# Patient Record
Sex: FEMALE | Race: WHITE | Employment: FULL TIME | URBAN - METROPOLITAN AREA
[De-identification: names, ages, dates, MRNs, and addresses within clinical notes are randomized per-mention and may not be internally consistent; named-entity substitution may affect disease eponyms.]

---

## 2017-01-01 DIAGNOSIS — F33.0 MILD RECURRENT MAJOR DEPRESSION (H): Primary | ICD-10-CM

## 2017-01-02 NOTE — TELEPHONE ENCOUNTER
SERTRALINE HCL 50 MG TABLET     Last Written Prescription Date: 11/2/2016  Last Fill Quantity: 30, # refills: 1  Last Office Visit with [facility] primary care provider:  7/13/2016        Last PHQ-9 score on record=   PHQ-9 SCORE 7/13/2016   Total Score -   Total Score 5

## 2017-01-02 NOTE — TELEPHONE ENCOUNTER
Prescription approved per [facility] [place] [facility].  Magalis Nolen RN

## 2017-01-04 ENCOUNTER — TRANSFERRED RECORDS (OUTPATIENT)
Dept: HEALTH INFORMATION MANAGEMENT | Facility: CLINIC | Age: 52
End: 2017-01-04

## 2017-01-04 LAB — PAP-ABSTRACT: NORMAL

## 2017-01-09 ENCOUNTER — OFFICE VISIT (OUTPATIENT)
Dept: FAMILY MEDICINE | Facility: CLINIC | Age: 52
End: 2017-01-09
Payer: COMMERCIAL

## 2017-01-09 ENCOUNTER — HOSPITAL ENCOUNTER (OUTPATIENT)
Dept: MAMMOGRAPHY | Facility: CLINIC | Age: 52
Discharge: HOME | End: 2017-01-09
Attending: PHYSICIAN ASSISTANT | Admitting: PHYSICIAN ASSISTANT
Payer: COMMERCIAL

## 2017-01-09 VITALS
HEIGHT: 67 IN | OXYGEN SATURATION: 96 % | BODY MASS INDEX: 45.99 KG/M2 | HEART RATE: 82 BPM | SYSTOLIC BLOOD PRESSURE: 110 MMHG | WEIGHT: 293 LBS | DIASTOLIC BLOOD PRESSURE: 80 MMHG | TEMPERATURE: 99.4 F

## 2017-01-09 DIAGNOSIS — E11.9 TYPE 2 DIABETES MELLITUS WITHOUT COMPLICATION, WITHOUT LONG-TERM CURRENT USE OF INSULIN (H): Primary | ICD-10-CM

## 2017-01-09 DIAGNOSIS — E78.5 HYPERLIPIDEMIA WITH TARGET LDL LESS THAN 100: ICD-10-CM

## 2017-01-09 DIAGNOSIS — Z12.31 VISIT FOR SCREENING MAMMOGRAM: ICD-10-CM

## 2017-01-09 DIAGNOSIS — F33.1 MODERATE EPISODE OF RECURRENT MAJOR DEPRESSIVE DISORDER (H): ICD-10-CM

## 2017-01-09 DIAGNOSIS — E66.01 MORBID OBESITY, UNSPECIFIED OBESITY TYPE (H): ICD-10-CM

## 2017-01-09 DIAGNOSIS — Z23 NEED FOR PROPHYLACTIC VACCINATION AND INOCULATION AGAINST INFLUENZA: ICD-10-CM

## 2017-01-09 DIAGNOSIS — Z13.89 SCREENING FOR DIABETIC PERIPHERAL NEUROPATHY: ICD-10-CM

## 2017-01-09 DIAGNOSIS — Z11.59 NEED FOR HEPATITIS C SCREENING TEST: ICD-10-CM

## 2017-01-09 LAB
ALBUMIN SERPL-MCNC: 3.8 G/DL (ref 3.4–5)
ALP SERPL-CCNC: 88 U/L (ref 40–150)
ALT SERPL W P-5'-P-CCNC: 51 U/L (ref 0–50)
ANION GAP SERPL CALCULATED.3IONS-SCNC: 6 MMOL/L (ref 3–14)
AST SERPL W P-5'-P-CCNC: 20 U/L (ref 0–45)
BILIRUB SERPL-MCNC: 0.4 MG/DL (ref 0.2–1.3)
BUN SERPL-MCNC: 14 MG/DL (ref 7–30)
CALCIUM SERPL-MCNC: 9.3 MG/DL (ref 8.5–10.1)
CHLORIDE SERPL-SCNC: 105 MMOL/L (ref 94–109)
CHOLEST SERPL-MCNC: 174 MG/DL
CO2 SERPL-SCNC: 28 MMOL/L (ref 20–32)
CREAT SERPL-MCNC: 0.65 MG/DL (ref 0.52–1.04)
ERYTHROCYTE [DISTWIDTH] IN BLOOD BY AUTOMATED COUNT: 14 % (ref 10–15)
GFR SERPL CREATININE-BSD FRML MDRD: ABNORMAL ML/MIN/1.7M2
GLUCOSE SERPL-MCNC: 128 MG/DL (ref 70–99)
HBA1C MFR BLD: 6.4 % (ref 4.3–6)
HCT VFR BLD AUTO: 41.1 % (ref 35–47)
HDLC SERPL-MCNC: 54 MG/DL
HGB BLD-MCNC: 13.5 G/DL (ref 11.7–15.7)
LDLC SERPL CALC-MCNC: 96 MG/DL
MCH RBC QN AUTO: 28.1 PG (ref 26.5–33)
MCHC RBC AUTO-ENTMCNC: 32.8 G/DL (ref 31.5–36.5)
MCV RBC AUTO: 86 FL (ref 78–100)
NONHDLC SERPL-MCNC: 120 MG/DL
PLATELET # BLD AUTO: 241 10E9/L (ref 150–450)
POTASSIUM SERPL-SCNC: 4.2 MMOL/L (ref 3.4–5.3)
PROT SERPL-MCNC: 7.6 G/DL (ref 6.8–8.8)
RBC # BLD AUTO: 4.8 10E12/L (ref 3.8–5.2)
SODIUM SERPL-SCNC: 139 MMOL/L (ref 133–144)
TRIGL SERPL-MCNC: 120 MG/DL
WBC # BLD AUTO: 6.5 10E9/L (ref 4–11)

## 2017-01-09 PROCEDURE — 83036 HEMOGLOBIN GLYCOSYLATED A1C: CPT | Performed by: PHYSICIAN ASSISTANT

## 2017-01-09 PROCEDURE — 80061 LIPID PANEL: CPT | Performed by: PHYSICIAN ASSISTANT

## 2017-01-09 PROCEDURE — 90686 IIV4 VACC NO PRSV 0.5 ML IM: CPT | Performed by: PHYSICIAN ASSISTANT

## 2017-01-09 PROCEDURE — G0202 SCR MAMMO BI INCL CAD: HCPCS

## 2017-01-09 PROCEDURE — 99207 C FOOT EXAM  NO CHARGE: CPT | Performed by: PHYSICIAN ASSISTANT

## 2017-01-09 PROCEDURE — 85027 COMPLETE CBC AUTOMATED: CPT | Performed by: PHYSICIAN ASSISTANT

## 2017-01-09 PROCEDURE — 90471 IMMUNIZATION ADMIN: CPT | Performed by: PHYSICIAN ASSISTANT

## 2017-01-09 PROCEDURE — 99214 OFFICE O/P EST MOD 30 MIN: CPT | Mod: 25 | Performed by: PHYSICIAN ASSISTANT

## 2017-01-09 PROCEDURE — 80053 COMPREHEN METABOLIC PANEL: CPT | Performed by: PHYSICIAN ASSISTANT

## 2017-01-09 PROCEDURE — 36415 COLL VENOUS BLD VENIPUNCTURE: CPT | Performed by: PHYSICIAN ASSISTANT

## 2017-01-09 PROCEDURE — 99000 SPECIMEN HANDLING OFFICE-LAB: CPT | Performed by: PHYSICIAN ASSISTANT

## 2017-01-09 PROCEDURE — 86803 HEPATITIS C AB TEST: CPT | Mod: 90 | Performed by: PHYSICIAN ASSISTANT

## 2017-01-09 RX ORDER — SERTRALINE HYDROCHLORIDE 100 MG/1
100 TABLET, FILM COATED ORAL DAILY
Qty: 90 TABLET | Refills: 1 | Status: SHIPPED | OUTPATIENT
Start: 2017-01-09 | End: 2017-06-01

## 2017-01-09 RX ORDER — BUPROPION HYDROCHLORIDE 150 MG/1
150 TABLET ORAL EVERY MORNING
Qty: 90 TABLET | Refills: 1 | Status: SHIPPED | OUTPATIENT
Start: 2017-01-09 | End: 2017-06-01

## 2017-01-09 RX ORDER — METFORMIN HCL 500 MG
TABLET, EXTENDED RELEASE 24 HR ORAL
Qty: 180 TABLET | Refills: 1 | Status: SHIPPED | OUTPATIENT
Start: 2017-01-09 | End: 2017-06-01

## 2017-01-09 RX ORDER — MULTIVIT WITH MINERALS/LUTEIN
1 TABLET ORAL DAILY
COMMUNITY
End: 2018-03-08 | Stop reason: ALTCHOICE

## 2017-01-09 RX ORDER — ATORVASTATIN CALCIUM 10 MG/1
TABLET, FILM COATED ORAL
Qty: 90 TABLET | Refills: 3 | Status: SHIPPED | OUTPATIENT
Start: 2017-01-09 | End: 2018-03-01

## 2017-01-09 NOTE — MR AVS SNAPSHOT
After Visit Summary   1/9/2017    Juliann Mitchell    MRN: [id number]           Patient Information     Date Of Birth          1965        Visit Information        Provider Department      1/9/2017 11:00 AM Jessica Parham PA-C [facility]        Today's Diagnoses     Need for hepatitis C screening test    -  1     Screening for diabetic peripheral neuropathy         Need for prophylactic vaccination and inoculation against influenza         Need for prophylactic vaccination against Streptococcus pneumoniae (pneumococcus)         Mild recurrent major depression (H)         Hyperglycemia         Hyperlipidemia with target LDL less than 100         Type 2 diabetes mellitus without complication, without long-term current use of insulin (H)         Moderate episode of recurrent major depressive disorder (H)            Follow-ups after your visit        Future tests that were ordered for you today     Open Future Orders        Priority Expected Expires Ordered    MA Screening Digital Bilateral Routine  1/9/2018 1/9/2017            Who to contact     If you have questions or need follow up information about today's clinic visit or your schedule please contact [facility] directly at [phone].  Normal or non-critical lab and imaging results will be communicated to you by [employer], letter or phone within 4 business days after the clinic has received the results. If you do not hear from us within 7 days, please contact the clinic through [employer] or phone. If you have a critical or abnormal lab result, we will notify you by phone as soon as possible.  Submit refill requests through [employer] or call your pharmacy and they will forward the refill request to us. Please allow 3 business days for your refill to be completed.          Additional Information About Your Visit        [employer] Information     [employer] gives you secure access to your electronic health record. If you see a  "primary care provider, you can also send messages to your care team and make appointments. If you have questions, please call your primary care clinic.  If you do not have a primary care provider, please call [phone] and they will assist you.        Care EveryWhere ID     This is your Care EveryWhere ID. This could be used by other organizations to access your [place] medical records  [id number]        Your Vitals Were     Pulse Temperature Height BMI (Body Mass Index) Pulse Oximetry Last Period    82 99.4  F (37.4  C) (Oral) 5' 7\" (1.702 m) 47.29 kg/m2 96% 06/06/2015       Blood Pressure from Last 3 Encounters:   01/09/17 110/80   07/13/16 134/90   12/22/15 118/89    Weight from Last 3 Encounters:   01/09/17 302 lb (136.986 kg)   07/13/16 312 lb (141.522 kg)   12/22/15 312 lb (141.522 kg)              We Performed the Following     Albumin Random Urine Quantitative     CBC with platelets     Comprehensive metabolic panel     DEPRESSION ACTION PLAN (DAP) Order [[id number]]     FOOT EXAM  NO CHARGE [18003.114]     Hemoglobin A1c     Hepatitis C Screen Reflex to HCV RNA Quant and Genotype     Lipid Profile          Today's Medication Changes          These changes are accurate as of: 1/9/17 11:23 AM.  If you have any questions, ask your nurse or doctor.               These medicines have changed or have updated prescriptions.        Dose/Directions    buPROPion 150 MG 24 hr tablet   Commonly known as:  WELLBUTRIN XL   This may have changed:  See the new instructions.   Used for:  Moderate episode of recurrent major depressive disorder (H)   Changed by:  Jessica Parham PA-C        Dose:  150 mg   Take 1 tablet (150 mg) by mouth every morning   Quantity:  90 tablet   Refills:  1       sertraline 100 MG tablet   Commonly known as:  ZOLOFT   This may have changed:  See the new instructions.   Used for:  Mild recurrent major depression (H)   Changed by:  Jessica Parham PA-C        Dose:  100 mg   Take 1 tablet " (100 mg) by mouth daily   Quantity:  90 tablet   Refills:  1            Where to get your medicines      These medications were sent to [facility]/pharmacy #[id number] - [place], [place] - [address] [place] AT [facility]  [address] [facility] [address]     Phone:  [phone]    - buPROPion 150 MG 24 hr tablet  - sertraline 100 MG tablet             Primary Care Provider Office Phone # Fax #    Jessica Parham PA-C [phone]       [facility] [address]   [facility] [address]        Thank you!     Thank you for choosing [facility]  for your care. Our goal is always to provide you with excellent care. Hearing back from our patients is one way we can continue to improve our services. Please take a few minutes to complete the written survey that you may receive in the mail after your visit with us. Thank you!             Your Updated Medication List - Protect others around you: Learn how to safely use, store and throw away your medicines at www.disposemymeds.org.          This list is accurate as of: 1/9/17 11:23 AM.  Always use your most recent med list.                   Brand Name Dispense Instructions for use    atorvastatin 10 MG tablet    LIPITOR    30 tablet    TAKE 1 TABLET (10 MG) BY MOUTH DAILY       blood glucose calibration solution     1 Bottle    Use to check each new box of test strips for accuracy.       blood glucose monitoring lancets     1 Box    Use to test blood sugar 1 time daily or as directed.       buPROPion 150 MG 24 hr tablet    WELLBUTRIN XL    90 tablet    Take 1 tablet (150 mg) by mouth every morning       CENTRUM SILVER per tablet      Take 1 tablet by mouth daily       CO Q 10 PO      Take by mouth daily       COLLAGEN PLUS VITAMIN C PO      Take 1 tablet by mouth daily       FREESTYLE LITE test strip   Generic drug:  blood glucose monitoring     50 each    USE TO TEST BLOOD SUGAR 1 TIMES DAILY OR AS DIRECTED (VARY  TIMES BEFORE MEALS AND BED).       metFORMIN 500 MG 24 hr tablet    GLUCOPHAGE-XR    60 tablet    TAKE 2 TABLETS (1,000 MG) BY MOUTH DAILY (WITH DINNER)       sertraline 100 MG tablet    ZOLOFT    90 tablet    Take 1 tablet (100 mg) by mouth daily       VITAMIN D (CHOLECALCIFEROL) PO      Take 2,000 Units by mouth daily

## 2017-01-09 NOTE — NURSING NOTE
"Chief Complaint   Patient presents with     Follow Up For     diabetes       Initial /92 mmHg  Pulse 82  Temp(Src) 99.4  F (37.4  C) (Oral)  Ht 5' 7\" (1.702 m)  Wt 302 lb (136.986 kg)  BMI 47.29 kg/m2  SpO2 96%  LMP 06/06/2015 Estimated body mass index is 47.29 kg/(m^2) as calculated from the following:    Height as of this encounter: 5' 7\" (1.702 m).    Weight as of this encounter: 302 lb (136.986 kg).  BP completed using cuff size: large, left arm  Bambi Forman MA  "

## 2017-01-09 NOTE — PROGRESS NOTES
"HPI: 52 yo female here for f/u DM, depression and obesity  Depression:   She tapered herself off of lexapro to see if that was causing wt gain, but then felt more irritable and had a \"mental breakdown\"  She tried wellbutrin but was still crying all of the time so Zoloft 50mg qd was added and depression sxs improved.  She feels well on this combination but has a very stressful job and would like to try a higher dose of the zoloft.  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  She has some twitching of her eye but that resolved; she wonders if this was stress related.  She does drink a cup of coffee in the morning. State she feels she sleeps well but can't sleep with  as he snores.    Diabetes:  She brings in an aminta she has showing her recent blood sugars which seem to fluctuate but mostly stay under 200  She rarely feels a little shaky if her blood sugar is in the 70s although this is rare.  She denies any SE from her metformin 1000mg with dinner    Obesity:  She has been able to lose some weight since her last office visit so encouraged by that.  Trying to make better food choices  She is in the process of getting the gastric sleeve surgery in June of this year if everything goes through.  She does have the lap band but this has not been effective for her.    Past Medical History   Diagnosis Date     Depression, major, recurrent (H)      Obesity      s/p lap band     Colon polyps      FH: colon cancer      father     Pituitary adenoma (H)      Resected age 16     Past Surgical History   Procedure Laterality Date     Laparoscopic gastric adjustable banding       [facility]     Benign pituitary gland tumor removed       age 16     Social History   Substance Use Topics     Smoking status: Never Smoker      Smokeless tobacco: Never Used     Alcohol Use: No     Current Outpatient Prescriptions   Medication Sig Dispense Refill     Multiple Vitamins-Minerals " "(CENTRUM SILVER) per tablet Take 1 tablet by mouth daily       Coenzyme Q10 (CO Q 10 PO) Take by mouth daily       sertraline (ZOLOFT) 100 MG tablet Take 1 tablet (100 mg) by mouth daily 90 tablet 1     buPROPion (WELLBUTRIN XL) 150 MG 24 hr tablet Take 1 tablet (150 mg) by mouth every morning 90 tablet 1     metFORMIN (GLUCOPHAGE-XR) 500 MG 24 hr tablet TAKE 2 TABLETS (1,000 MG) BY MOUTH DAILY (WITH DINNER) 180 tablet 1     atorvastatin (LIPITOR) 10 MG tablet TAKE 1 TABLET (10 MG) BY MOUTH DAILY 90 tablet 3     FREESTYLE LITE test strip USE TO TEST BLOOD SUGAR 1 TIMES DAILY OR AS DIRECTED (VARY TIMES BEFORE MEALS AND BED). 50 each 11     Collagen-Vitamin C (COLLAGEN PLUS VITAMIN C PO) Take 1 tablet by mouth daily       VITAMIN D, CHOLECALCIFEROL, PO Take 2,000 Units by mouth daily       blood glucose monitoring (FREESTYLE) lancets Use to test blood sugar 1 time daily or as directed. 1 Box 3     blood glucose calibration (FREESTYLE CONTROL) solution Use to check each new box of test strips for accuracy. 1 Bottle 3     [DISCONTINUED] sertraline (ZOLOFT) 50 MG tablet TAKE 1 TABLET (50 MG) BY MOUTH DAILY 30 tablet 3     [DISCONTINUED] buPROPion (WELLBUTRIN XL) 150 MG 24 hr tablet TAKE 1 TABLET (150 MG) BY MOUTH EVERY MORNING 30 tablet 1     [DISCONTINUED] atorvastatin (LIPITOR) 10 MG tablet TAKE 1 TABLET (10 MG) BY MOUTH DAILY 30 tablet 3     [DISCONTINUED] buPROPion (WELLBUTRIN XL) 300 MG 24 hr tablet Take 1 tablet (300 mg) by mouth every morning 90 tablet 1     [DISCONTINUED] metFORMIN (GLUCOPHAGE-XR) 500 MG 24 hr tablet TAKE 2 TABLETS (1,000 MG) BY MOUTH DAILY (WITH DINNER) 60 tablet 1     Allergies   Allergen Reactions     No Known Drug Allergy      FAMILY HISTORY NOTED AND REVIEWED      PHYSICAL EXAM:    /80 mmHg  Pulse 82  Temp(Src) 99.4  F (37.4  C) (Oral)  Ht 5' 7\" (1.702 m)  Wt 302 lb (136.986 kg)  BMI 47.29 kg/m2  SpO2 96%  LMP 06/06/2015    Patient appears non toxic  Lungs: CTA bilat  Heart: RRR " without m/r/g  Foot exam: no edema or ulcerations  Psych: approp affect and mood.  Good eye contact, not anxious or tearful    Results for orders placed or performed in visit on 01/09/17   Comprehensive metabolic panel   Result Value Ref Range    Sodium 139 133 - 144 mmol/L    Potassium 4.2 3.4 - 5.3 mmol/L    Chloride 105 94 - 109 mmol/L    Carbon Dioxide 28 20 - 32 mmol/L    Anion Gap 6 3 - 14 mmol/L    Glucose 128 (H) 70 - 99 mg/dL    Urea Nitrogen 14 7 - 30 mg/dL    Creatinine 0.65 0.52 - 1.04 mg/dL    GFR Estimate >90  Non  GFR Calc   >60 mL/min/1.7m2    GFR Estimate If Black >90   GFR Calc   >60 mL/min/1.7m2    Calcium 9.3 8.5 - 10.1 mg/dL    Bilirubin Total 0.4 0.2 - 1.3 mg/dL    Albumin 3.8 3.4 - 5.0 g/dL    Protein Total 7.6 6.8 - 8.8 g/dL    Alkaline Phosphatase 88 40 - 150 U/L    ALT 51 (H) 0 - 50 U/L    AST 20 0 - 45 U/L   Hemoglobin A1c   Result Value Ref Range    Hemoglobin A1C 6.4 (H) 4.3 - 6.0 %   Lipid Profile   Result Value Ref Range    Cholesterol 174 <200 mg/dL    Triglycerides 120 <150 mg/dL    HDL Cholesterol 54 >49 mg/dL    LDL Cholesterol Calculated 96 <100 mg/dL    Non HDL Cholesterol 120 <130 mg/dL   CBC with platelets   Result Value Ref Range    WBC 6.5 4.0 - 11.0 10e9/L    RBC Count 4.80 3.8 - 5.2 10e12/L    Hemoglobin 13.5 11.7 - 15.7 g/dL    Hematocrit 41.1 35.0 - 47.0 %    MCV 86 78 - 100 fl    MCH 28.1 26.5 - 33.0 pg    MCHC 32.8 31.5 - 36.5 g/dL    RDW 14.0 10.0 - 15.0 %    Platelet Count 241 150 - 450 10e9/L         Assessment and Plan:     (E11.9) Type 2 diabetes mellitus without complication, without long-term current use of insulin (H)  (primary encounter diagnosis)  Comment: A1c at 6.4% today so at goal.  Cont same dose of metformin  Plan: Comprehensive metabolic panel, Hemoglobin A1c,         Lipid Profile, CBC with platelets, metFORMIN         (GLUCOPHAGE-XR) 500 MG 24 hr tablet, Albumin         Random Urine Quantitative, CANCELED: Albumin          Random Urine Quantitative        (not able to urinate today)    (F33.1) Moderate episode of recurrent major depressive disorder (H)  Comment: increased zoloft from 50 to 100mg qd. She will cont to take this in combination with wellbutrin as the wellbutrin has decreased her food cravings.  Plan: buPROPion (WELLBUTRIN XL) 150 MG 24 hr tablet            (E66.01) Morbid obesity, unspecified obesity type (H)  Comment:   Plan: She has been losing weight and in the process of trying to get approved for gastric sleeve surgery    (E78.5) Hyperlipidemia with target LDL less than 100  Comment:   Plan: atorvastatin (LIPITOR) 10 MG tablet        refilled    (Z11.59) Need for hepatitis C screening test  Comment:   Plan: Hepatitis C Screen Reflex to HCV RNA Quant and         Genotype            (Z13.89) Screening for diabetic peripheral neuropathy  Comment:   Plan: FOOT EXAM  NO CHARGE [15142.114]            (Z23) Need for prophylactic vaccination and inoculation against influenza  Comment:   Plan: FLU VAC, SPLIT VIRUS IM > 3 YO (QUADRIVALENT)         [78997], Vaccine Administration, Initial         [26866]              Jessica Parham PA-C

## 2017-01-09 NOTE — PROGRESS NOTES
Injectable Influenza Immunization Documentation    1.  Is the person to be vaccinated sick today?  No    2. Does the person to be vaccinated have an allergy to eggs or to a component of the vaccine?  No    3. Has the person to be vaccinated today ever had a serious reaction to influenza vaccine in the past?  No    4. Has the person to be vaccinated ever had Guillain-Lentner syndrome?  No     Form completed by patient.

## 2017-01-10 LAB — HCV AB SERPL QL IA: NORMAL

## 2017-01-10 ASSESSMENT — PATIENT HEALTH QUESTIONNAIRE - PHQ9: SUM OF ALL RESPONSES TO PHQ QUESTIONS 1-9: 3

## 2017-01-11 NOTE — PROGRESS NOTES
Quick Note:    It was a pleasure seeing you. I wanted to get back to you with your test results. I have enclosed a copy for your review.     The screening test for hepatitis C was negative.    I am happy to report that your CBC or complete blood count is normal with no signs of anemia, leukemia or platelet abnormalities. Your chemistry panel shows a blood sugar of 128. Your A1c shows good diabetes control as we discussed. Your blood salts, kidney tests, and proteins are all fine. The liver functions are almost normal.    Your total cholesterol is 174 with the normal range being below 200. Your HDL or good cholesterol is 54 with the normal range being above 50. Your LDL or bad cholesterol is 96 with the normal range being below 100. This looks good.    Let me know if you have any questions.    Jessica Parham PA-C      ______

## 2017-06-01 ENCOUNTER — OFFICE VISIT (OUTPATIENT)
Dept: FAMILY MEDICINE | Facility: CLINIC | Age: 52
End: 2017-06-01
Payer: COMMERCIAL

## 2017-06-01 VITALS
SYSTOLIC BLOOD PRESSURE: 120 MMHG | BODY MASS INDEX: 45.99 KG/M2 | TEMPERATURE: 98.7 F | DIASTOLIC BLOOD PRESSURE: 72 MMHG | HEIGHT: 67 IN | HEART RATE: 109 BPM | OXYGEN SATURATION: 96 % | WEIGHT: 293 LBS

## 2017-06-01 DIAGNOSIS — F33.1 MODERATE EPISODE OF RECURRENT MAJOR DEPRESSIVE DISORDER (H): ICD-10-CM

## 2017-06-01 DIAGNOSIS — E11.9 TYPE 2 DIABETES MELLITUS WITHOUT COMPLICATION, WITHOUT LONG-TERM CURRENT USE OF INSULIN (H): ICD-10-CM

## 2017-06-01 DIAGNOSIS — Z01.818 PREOP GENERAL PHYSICAL EXAM: Primary | ICD-10-CM

## 2017-06-01 DIAGNOSIS — E66.01 MORBID OBESITY, UNSPECIFIED OBESITY TYPE (H): ICD-10-CM

## 2017-06-01 LAB
HBA1C MFR BLD: 6.3 % (ref 4.3–6)
HGB BLD-MCNC: 14 G/DL (ref 11.7–15.7)

## 2017-06-01 PROCEDURE — 93000 ELECTROCARDIOGRAM COMPLETE: CPT | Performed by: PHYSICIAN ASSISTANT

## 2017-06-01 PROCEDURE — 85018 HEMOGLOBIN: CPT | Performed by: PHYSICIAN ASSISTANT

## 2017-06-01 PROCEDURE — 36415 COLL VENOUS BLD VENIPUNCTURE: CPT | Performed by: PHYSICIAN ASSISTANT

## 2017-06-01 PROCEDURE — 83036 HEMOGLOBIN GLYCOSYLATED A1C: CPT | Performed by: PHYSICIAN ASSISTANT

## 2017-06-01 PROCEDURE — 99214 OFFICE O/P EST MOD 30 MIN: CPT | Performed by: PHYSICIAN ASSISTANT

## 2017-06-01 RX ORDER — METFORMIN HCL 500 MG
TABLET, EXTENDED RELEASE 24 HR ORAL
Qty: 180 TABLET | Refills: 1 | Status: SHIPPED | OUTPATIENT
Start: 2017-06-01 | End: 2018-03-01

## 2017-06-01 RX ORDER — SERTRALINE HYDROCHLORIDE 100 MG/1
100 TABLET, FILM COATED ORAL DAILY
Qty: 90 TABLET | Refills: 1 | Status: SHIPPED | OUTPATIENT
Start: 2017-06-01 | End: 2018-01-21

## 2017-06-01 RX ORDER — BUPROPION HYDROCHLORIDE 150 MG/1
150 TABLET ORAL EVERY MORNING
Qty: 90 TABLET | Refills: 1 | Status: SHIPPED | OUTPATIENT
Start: 2017-06-01 | End: 2018-03-01

## 2017-06-01 NOTE — MR AVS SNAPSHOT
After Visit Summary   6/1/2017    Juliann Mitchell    MRN: [id number]           Patient Information     Date Of Birth          1965        Visit Information        Provider Department      6/1/2017 3:30 PM Jessica Parham PA-C [facility]        Today's Diagnoses     Preop general physical exam    -  1    Morbid obesity, unspecified obesity type (H)        Type 2 diabetes mellitus without complication, without long-term current use of insulin (H)        Moderate episode of recurrent major depressive disorder (H)          Care Instructions      Before Your Surgery      Call your surgeon if there is any change in your health. This includes signs of a cold or flu (such as a sore throat, runny nose, cough, rash or fever).    Do not smoke, drink alcohol or take over the counter medicine (unless your surgeon or primary care doctor tells you to) for the 24 hours before and after surgery.    If you take prescribed drugs: Follow your doctor s orders about which medicines to take and which to stop until after surgery.    Eating and drinking prior to surgery: follow the instructions from your surgeon    Take a shower or bath the night before surgery. Use the soap your surgeon gave you to gently clean your skin. If you do not have soap from your surgeon, use your regular soap. Do not shave or scrub the surgery site.  Wear clean pajamas and have clean sheets on your bed.           Follow-ups after your visit        Future tests that were ordered for you today     Open Future Orders        Priority Expected Expires Ordered    Albumin Random Urine Quantitative Routine  6/1/2018 6/1/2017            Who to contact     If you have questions or need follow up information about today's clinic visit or your schedule please contact [facility] directly at [phone].  Normal or non-critical lab and imaging results will be communicated to you by MyChart, letter or phone within 4 business days  "after the clinic has received the results. If you do not hear from us within 7 days, please contact the clinic through [email or web address] or phone. If you have a critical or abnormal lab result, we will notify you by phone as soon as possible.  Submit refill requests through [email or web address] or call your pharmacy and they will forward the refill request to us. Please allow 3 business days for your refill to be completed.          Additional Information About Your Visit        [employer] Information     [email or web address] gives you secure access to your electronic health record. If you see a primary care provider, you can also send messages to your care team and make appointments. If you have questions, please call your primary care clinic.  If you do not have a primary care provider, please call [phone] and they will assist you.        Care EveryWhere ID     This is your Care EveryWhere ID. This could be used by other organizations to access your [place] medical records  [id number]        Your Vitals Were     Pulse Temperature Height Pulse Oximetry Breastfeeding? BMI (Body Mass Index)    109 98.7  F (37.1  C) (Tympanic) 5' 7\" (1.702 m) 96% No 48.46 kg/m2       Blood Pressure from Last 3 Encounters:   06/01/17 120/72   01/09/17 110/80   07/13/16 134/90    Weight from Last 3 Encounters:   06/01/17 (!) 309 lb 6.4 oz (140.3 kg)   01/09/17 (!) 302 lb (137 kg)   07/13/16 (!) 312 lb (141.5 kg)              We Performed the Following     EKG 12-lead complete w/read - Clinics     Hemoglobin A1c     Hemoglobin          Where to get your medicines      These medications were sent to [facility]/pharmacy #[id number] - [place], [place] - [address], [place] AT [place] [address] [facility] [address]     Phone:  [phone]     buPROPion 150 MG 24 hr tablet    metFORMIN 500 MG 24 hr tablet    sertraline 100 MG tablet          Primary Care Provider Office Phone # Fax #    Jessica Parham PA-C [phone]       " [facility] [address]   [facility] [address]        Thank you!     Thank you for choosing [facility]  for your care. Our goal is always to provide you with excellent care. Hearing back from our patients is one way we can continue to improve our services. Please take a few minutes to complete the written survey that you may receive in the mail after your visit with us. Thank you!             Your Updated Medication List - Protect others around you: Learn how to safely use, store and throw away your medicines at www.disposemymeds.org.          This list is accurate as of: 6/1/17  4:37 PM.  Always use your most recent med list.                   Brand Name Dispense Instructions for use    atorvastatin 10 MG tablet    LIPITOR    90 tablet    TAKE 1 TABLET (10 MG) BY MOUTH DAILY       blood glucose calibration solution     1 Bottle    Use to check each new box of test strips for accuracy.       blood glucose monitoring lancets     1 Box    Use to test blood sugar 1 time daily or as directed.       buPROPion 150 MG 24 hr tablet    WELLBUTRIN XL    90 tablet    Take 1 tablet (150 mg) by mouth every morning       CENTRUM SILVER per tablet      Take 1 tablet by mouth daily       CO Q 10 PO      Take by mouth daily       COLLAGEN PLUS VITAMIN C PO      Take 1 tablet by mouth daily       FREESTYLE LITE test strip   Generic drug:  blood glucose monitoring     50 each    USE TO TEST BLOOD SUGAR 1 TIMES DAILY OR AS DIRECTED (VARY TIMES BEFORE MEALS AND BED).       metFORMIN 500 MG 24 hr tablet    GLUCOPHAGE-XR    180 tablet    TAKE 2 TABLETS (1,000 MG) BY MOUTH DAILY (WITH DINNER)       sertraline 100 MG tablet    ZOLOFT    90 tablet    Take 1 tablet (100 mg) by mouth daily       VITAMIN D (CHOLECALCIFEROL) PO      Take 2,000 Units by mouth daily

## 2017-06-01 NOTE — PROGRESS NOTES
[address] [address]  [phone]  Dept: [phone]    PRE-OP EVALUATION:  Today's date: 2017    Juliann Mitchell (: 1965) presents for pre-operative evaluation assessment as requested by Dr. Noyola.  She requires evaluation and anesthesia risk assessment prior to undergoing surgery/procedure for treatment of obesity .  Proposed procedure: Gastric Sleeve and removal of lap band    Date of Surgery/ Procedure: 2017  Time of Surgery/ Procedure: 07:00 am  Hospital/Surgical Facility: [facility]  Fax number for surgical facility: [phone]  Primary Physician: Jessica Parham PA-C  Type of Anesthesia Anticipated: to be determined    Patient has a Health Care Directive or Living Will:  NO      1. NO - Do you have a history of heart attack, stroke, stent, bypass or surgery on an artery in the head, neck, heart or legs?  2. NO - Do you ever have any pain or discomfort in your chest?  3. NO - Do you have a history of  Heart Failure?  4. NO - Are you troubled by shortness of breath when: walking on the level, up a slight hill or at night?  5. NO - Do you currently have a cold, bronchitis or other respiratory infection?  6. NO - Do you have a cough, shortness of breath or wheezing?  7. NO - Do you sometimes get pains in the calves of your legs when you walk?  8. NO - Do you or anyone in your family have previous history of blood clots?  9. NO - Do you or does anyone in your family have a serious bleeding problem such as prolonged bleeding following surgeries or cuts?  10. NO - Have you ever had problems with anemia or been told to take iron pills?  11. NO - Have you had any abnormal blood loss such as black, tarry or bloody stools, or abnormal vaginal bleeding?  12. NO - Have you ever had a blood transfusion?  13. NO - Have you or any of your relatives ever had problems with anesthesia?  14. NO - Do you have sleep apnea, excessive snoring or daytime  drowsiness?  15. NO - Do you have any prosthetic heart valves?  16. NO - Do you have prosthetic joints?  17. NO - Is there any chance that you may be pregnant?      HPI:                                                      Brief HPI related to upcoming procedure: pt had lap band procedure which was not effective for significant weight loss      MEDICAL HISTORY:                                                      Patient Active Problem List    Diagnosis Date Noted     Moderate episode of recurrent major depressive disorder (H) 08/15/2016     Priority: Medium     Type 2 diabetes mellitus without complications (H) 10/23/2015     Priority: Medium     Hyperlipidemia with target LDL less than 100 09/24/2015     Priority: Medium     Diagnosis updated by automated process. Provider to review and confirm.       Morbid obesity (H) 09/24/2015     Priority: Medium     Pituitary tumor 10/16/2013     Priority: Medium     Upper abdominal pain 10/16/2013     Priority: Medium     Hyperglycemia 03/28/2013     Priority: Medium     Hypocalcemia 03/28/2013     Priority: Medium     Vitamin D deficiency 03/28/2013     Priority: Medium     Problem list name updated by automated process. Provider to review and confirm  [employer] Update utility       Anemia 03/28/2013     Priority: Medium     Problem list name updated by automated process. Provider to review       Mild recurrent major depression (H) 05/31/2012     Priority: Medium      Past Medical History:   Diagnosis Date     Colon polyps      Depression, major, recurrent (H)      FH: colon cancer     father     Obesity     s/p lap band     Pituitary adenoma (H)     Resected age 16     Past Surgical History:   Procedure Laterality Date     Benign pituitary gland tumor removed      age 16     LAPAROSCOPIC GASTRIC ADJUSTABLE BANDING      [facility]     Current Outpatient Prescriptions   Medication Sig Dispense Refill     sertraline (ZOLOFT) 100 MG tablet Take 1 tablet (100 mg) by mouth daily  90 tablet 1     buPROPion (WELLBUTRIN XL) 150 MG 24 hr tablet Take 1 tablet (150 mg) by mouth every morning 90 tablet 1     metFORMIN (GLUCOPHAGE-XR) 500 MG 24 hr tablet TAKE 2 TABLETS (1,000 MG) BY MOUTH DAILY (WITH DINNER) 180 tablet 1     Multiple Vitamins-Minerals (CENTRUM SILVER) per tablet Take 1 tablet by mouth daily       Coenzyme Q10 (CO Q 10 PO) Take by mouth daily       atorvastatin (LIPITOR) 10 MG tablet TAKE 1 TABLET (10 MG) BY MOUTH DAILY 90 tablet 3     FREESTYLE LITE test strip USE TO TEST BLOOD SUGAR 1 TIMES DAILY OR AS DIRECTED (VARY TIMES BEFORE MEALS AND BED). 50 each 11     Collagen-Vitamin C (COLLAGEN PLUS VITAMIN C PO) Take 1 tablet by mouth daily       VITAMIN D, CHOLECALCIFEROL, PO Take 2,000 Units by mouth daily       blood glucose monitoring (FREESTYLE) lancets Use to test blood sugar 1 time daily or as directed. 1 Box 3     blood glucose calibration (FREESTYLE CONTROL) solution Use to check each new box of test strips for accuracy. 1 Bottle 3     [DISCONTINUED] sertraline (ZOLOFT) 100 MG tablet Take 1 tablet (100 mg) by mouth daily 90 tablet 1     [DISCONTINUED] buPROPion (WELLBUTRIN XL) 150 MG 24 hr tablet Take 1 tablet (150 mg) by mouth every morning 90 tablet 1     [DISCONTINUED] metFORMIN (GLUCOPHAGE-XR) 500 MG 24 hr tablet TAKE 2 TABLETS (1,000 MG) BY MOUTH DAILY (WITH DINNER) 180 tablet 1     OTC products: herbals and vitamins - vitamin D and multi vitamins    Allergies   Allergen Reactions     No Known Drug Allergy       Latex Allergy: NO    Social History   Substance Use Topics     Smoking status: Never Smoker     Smokeless tobacco: Never Used     Alcohol use No     History   Drug Use No       REVIEW OF SYSTEMS:                                                    C: NEGATIVE for fever, chills, change in weight  E/M: NEGATIVE for ear, mouth and throat problems  R: NEGATIVE for significant cough or SOB  CV: NEGATIVE for chest pain, palpitations or peripheral edema    EXAM:             "                                        /72 (BP Location: Right arm, Patient Position: Chair, Cuff Size: Adult Large)  Pulse 109  Temp 98.7  F (37.1  C) (Tympanic)  Ht 5' 7\" (1.702 m)  Wt (!) 309 lb 6.4 oz (140.3 kg)  SpO2 96%  Breastfeeding? No  BMI 48.46 kg/m2  GENERAL APPEARANCE: healthy, alert and no distress  HENT: ear canals and TM's normal and nose and mouth without ulcers or lesions  Breast exam: no masses or axillary LA  RESP: lungs clear to auscultation - no rales, rhonchi or wheezes  CV: regular rate and rhythm, normal S1 S2, no S3 or S4 and no murmur, click or rub   ABDOMEN: Lap band port palpable, soft, nontender, no HSM or masses and bowel sounds normal  NEURO: Normal strength and tone, sensory exam grossly normal, mentation intact and speech normal    DIAGNOSTICS:                                                      Results for orders placed or performed in visit on 06/01/17   Hemoglobin A1c   Result Value Ref Range    Hemoglobin A1C 6.3 (H) 4.3 - 6.0 %   Hemoglobin   Result Value Ref Range    Hemoglobin 14.0 11.7 - 15.7 g/dL     EKG: NSR with T wave inversions AVR and V1, no comparison avail    IMPRESSION:                                                    Reason for surgery/procedure: Gastric sleeve    The proposed surgical procedure is considered INTERMEDIATE risk.        ICD-10-CM    1. Preop general physical exam Z01.818 Hemoglobin   2. Morbid obesity, unspecified obesity type (H) E66.01    3. Type 2 diabetes mellitus without complication, without long-term current use of insulin (H) E11.9 Albumin Random Urine Quantitative     metFORMIN (GLUCOPHAGE-XR) 500 MG 24 hr tablet     Hemoglobin A1c     EKG 12-lead complete w/read - Clinics   4. Moderate episode of recurrent major depressive disorder (H) F33.1 sertraline (ZOLOFT) 100 MG tablet     buPROPion (WELLBUTRIN XL) 150 MG 24 hr tablet       RECOMMENDATIONS:                                                      --Consult hospital " rounder / IM to assist post-op medical management    --Patient is to take all scheduled medications on the day of surgery EXCEPT for modifications listed below.    Diabetes Medication Use    -----Hold usual  oral diabetic meds (e.g. Metformin, Actos, Glipizide) while NPO.       APPROVAL GIVEN to proceed with proposed procedure, without further diagnostic evaluation. She is take her wellbutrin and zoloft the AM of surgery with a sip of water.       Signed Electronically by: Jessica Parham PA-C    Copy of this evaluation report is provided to requesting physician.    [facility] Preop Guidelines

## 2017-06-01 NOTE — NURSING NOTE
"Chief Complaint   Patient presents with     Pre-Op Exam       Initial /72 (BP Location: Right arm, Patient Position: Chair, Cuff Size: Adult Large)  Pulse 109  Temp 98.7  F (37.1  C) (Tympanic)  Ht 5' 7\" (1.702 m)  Wt (!) 309 lb 6.4 oz (140.3 kg)  SpO2 96%  Breastfeeding? No  BMI 48.46 kg/m2 Estimated body mass index is 48.46 kg/(m^2) as calculated from the following:    Height as of this encounter: 5' 7\" (1.702 m).    Weight as of this encounter: 309 lb 6.4 oz (140.3 kg).  Medication Reconciliation: joana Alvarez      "

## 2017-08-25 ENCOUNTER — TRANSFERRED RECORDS (OUTPATIENT)
Dept: HEALTH INFORMATION MANAGEMENT | Facility: CLINIC | Age: 52
End: 2017-08-25

## 2017-11-11 ENCOUNTER — HEALTH MAINTENANCE LETTER (OUTPATIENT)
Age: 52
End: 2017-11-11

## 2017-12-03 ENCOUNTER — TRANSFERRED RECORDS (OUTPATIENT)
Dept: HEALTH INFORMATION MANAGEMENT | Facility: CLINIC | Age: 52
End: 2017-12-03

## 2017-12-03 LAB
ALT SERPL-CCNC: 53 U/L (ref 9–55)
AST SERPL-CCNC: 85 U/L (ref 10–40)
CREAT SERPL-MCNC: 0.74 MG/DL (ref 0.55–1.02)
GFR SERPL CREATININE-BSD FRML MDRD: >60 ML/MIN/1.73M2
GLUCOSE SERPL-MCNC: 167 MG/DL (ref 70–100)
POTASSIUM SERPL-SCNC: 4.4 MMOL/L (ref 3.5–5.2)

## 2018-01-21 DIAGNOSIS — F33.1 MODERATE EPISODE OF RECURRENT MAJOR DEPRESSIVE DISORDER (H): ICD-10-CM

## 2018-01-22 NOTE — TELEPHONE ENCOUNTER
"Requested Prescriptions   Pending Prescriptions Disp Refills     sertraline (ZOLOFT) 100 MG tablet [Pharmacy Med Name: SERTRALINE  MG TABLET] 90 tablet 1     Sig: TAKE 1 TABLET BY MOUTH DAILY    SSRIs Protocol Failed    1/21/2018  1:52 PM       Failed - PHQ-9 score less than 5 in past 6 months    The PHQ-9 criteria is meant to fail. It requires a PHQ-9 score review         Failed - Recent (6 mo) or future visit with authorizing provider's specialty    Patient had office visit in the last 6 months or has a visit in the next 30 days with authorizing provider.  See \"Patient Info\" tab in inbasket, or \"Choose Columns\" in Meds & Orders section of the refill encounter.           Passed - Patient is age 18 or older       Passed - No active pregnancy on record       Passed - No positive pregnancy test in last 12 months        sertraline (ZOLOFT) 100 MG tablet 90 tablet 1 6/1/2017       Last Written Prescription Date:  06/01/2017  Last Fill Quantity: 90,  # refills: 1   Last Office Visit with [facility], [facility] or [facility] prescribing provider:  06/01/2017   Future Office Visit:  Unknown       PHQ-9 SCORE 9/24/2015 7/13/2016 1/9/2017   Total Score - - -   Total Score 8 5 3       "

## 2018-01-24 RX ORDER — SERTRALINE HYDROCHLORIDE 100 MG/1
TABLET, FILM COATED ORAL
Qty: 30 TABLET | Refills: 0 | Status: SHIPPED | OUTPATIENT
Start: 2018-01-24 | End: 2018-03-01

## 2018-01-24 NOTE — TELEPHONE ENCOUNTER
Medication is being filled for 1 time refill only due to:  Patient needs to be seen because due for physical.   Cecily FIERRO RN

## 2018-02-20 DIAGNOSIS — F33.1 MODERATE EPISODE OF RECURRENT MAJOR DEPRESSIVE DISORDER (H): ICD-10-CM

## 2018-02-20 NOTE — TELEPHONE ENCOUNTER
"Last Written Prescription Date:  1/24/18  Last Fill Quantity: 30 tablet,  # refills: 0   Last office visit: 6/1/2017 with prescribing provider:  Dione   Future Office Visit:      *Note to pharmacy: 1 month refill only; patient due for appointment (physical)    Requested Prescriptions   Pending Prescriptions Disp Refills     sertraline (ZOLOFT) 100 MG tablet [Pharmacy Med Name: SERTRALINE  MG TABLET] 30 tablet 0     Sig: TAKE 1 TABLET BY MOUTH DAILY    SSRIs Protocol Failed    2/20/2018  2:02 AM       Failed - PHQ-9 score less than 5 in past 6 months    The PHQ-9 criteria is meant to fail. It requires a PHQ-9 score review         Failed - Recent (6 mo) or future visit with authorizing provider's specialty    Patient had office visit in the last 6 months or has a visit in the next 30 days with authorizing provider.  See \"Patient Info\" tab in inbasket, or \"Choose Columns\" in Meds & Orders section of the refill encounter.           Passed - Patient is age 18 or older       Passed - No active pregnancy on record       Passed - No positive pregnancy test in last 12 months        PHQ-9 SCORE 9/24/2015 7/13/2016 1/9/2017   Total Score - - -   Total Score 8 5 3       "

## 2018-02-20 NOTE — TELEPHONE ENCOUNTER
Patient given 1 month supply 1/24/18  Due for physical/diabetes follow up.  [employer] message sent to patient asking her to schedule appointment.  Chioma Sarmiento RN

## 2018-02-23 RX ORDER — SERTRALINE HYDROCHLORIDE 100 MG/1
TABLET, FILM COATED ORAL
Qty: 30 TABLET | Refills: 0 | OUTPATIENT
Start: 2018-02-23

## 2018-02-23 NOTE — TELEPHONE ENCOUNTER
Pt scheduled for 3/1. Would like a refill to get her through until appointment   Rapid strep positive. [facility] consulted and will see patient in ED. [facility] Peritonsilar abscess drained by ENT, will discharge home with po Clindamycin and ENT follow up.

## 2018-02-23 NOTE — TELEPHONE ENCOUNTER
Call to her pharmacy at [facility] and gave verbal order to pharmacist to authorize a 5 day supply of Zoloft 100 mg tabs to get her through until her appointment on 3/1/18 with Jessica Parham.  The pharmacy can only give them a three day supply without us calling to authorize.  She had already been granted a 30 day judy period.    LM on her Mobile VM regarding the 5 tablets.    Aleta Fwoler RN

## 2018-03-01 ENCOUNTER — OFFICE VISIT (OUTPATIENT)
Dept: FAMILY MEDICINE | Facility: CLINIC | Age: 53
End: 2018-03-01
Payer: COMMERCIAL

## 2018-03-01 ENCOUNTER — HOSPITAL ENCOUNTER (OUTPATIENT)
Dept: MAMMOGRAPHY | Facility: CLINIC | Age: 53
Discharge: HOME | End: 2018-03-01
Attending: PHYSICIAN ASSISTANT | Admitting: PHYSICIAN ASSISTANT
Payer: COMMERCIAL

## 2018-03-01 VITALS
BODY MASS INDEX: 38.14 KG/M2 | DIASTOLIC BLOOD PRESSURE: 83 MMHG | TEMPERATURE: 97.7 F | HEART RATE: 70 BPM | WEIGHT: 243 LBS | OXYGEN SATURATION: 97 % | SYSTOLIC BLOOD PRESSURE: 119 MMHG | HEIGHT: 67 IN

## 2018-03-01 DIAGNOSIS — E11.9 TYPE 2 DIABETES MELLITUS WITHOUT COMPLICATION, WITHOUT LONG-TERM CURRENT USE OF INSULIN (H): ICD-10-CM

## 2018-03-01 DIAGNOSIS — Z98.84 S/P BARIATRIC SURGERY: ICD-10-CM

## 2018-03-01 DIAGNOSIS — Z12.31 VISIT FOR SCREENING MAMMOGRAM: ICD-10-CM

## 2018-03-01 DIAGNOSIS — Z86.0100 HISTORY OF COLONIC POLYPS: ICD-10-CM

## 2018-03-01 DIAGNOSIS — K80.80 BILIARY CALCULUS OF OTHER SITE WITHOUT OBSTRUCTION: ICD-10-CM

## 2018-03-01 DIAGNOSIS — F33.1 MODERATE EPISODE OF RECURRENT MAJOR DEPRESSIVE DISORDER (H): Primary | ICD-10-CM

## 2018-03-01 PROCEDURE — 99214 OFFICE O/P EST MOD 30 MIN: CPT | Performed by: PHYSICIAN ASSISTANT

## 2018-03-01 PROCEDURE — 77067 SCR MAMMO BI INCL CAD: CPT

## 2018-03-01 RX ORDER — BUPROPION HYDROCHLORIDE 150 MG/1
150 TABLET ORAL EVERY MORNING
Qty: 90 TABLET | Refills: 1 | Status: SHIPPED | OUTPATIENT
Start: 2018-03-01 | End: 2018-09-27

## 2018-03-01 RX ORDER — SERTRALINE HYDROCHLORIDE 100 MG/1
100 TABLET, FILM COATED ORAL DAILY
Qty: 90 TABLET | Refills: 3 | Status: SHIPPED | OUTPATIENT
Start: 2018-03-01 | End: 2018-12-19

## 2018-03-01 NOTE — MR AVS SNAPSHOT
After Visit Summary   3/1/2018    Juliann Mitchell    MRN: [id number]           Patient Information     Date Of Birth          1965        Visit Information        Provider Department      3/1/2018 3:30 PM Jessica Parham PA-C [facility]        Today's Diagnoses     Biliary calculus of other site without obstruction    -  1    Type 2 diabetes mellitus without complication, without long-term current use of insulin (H)        History of colonic polyps        Moderate episode of recurrent major depressive disorder (H)          Care Instructions      Preventive Health Recommendations  Female Ages 50 - 64    Yearly exam: See your health care provider every year in order to  o Review health changes.   o Discuss preventive care.    o Review your medicines if your doctor has prescribed any.      Get a Pap test every three years (unless you have an abnormal result and your provider advises testing more often).    If you get Pap tests with HPV test, you only need to test every 5 years, unless you have an abnormal result.     You do not need a Pap test if your uterus was removed (hysterectomy) and you have not had cancer.    You should be tested each year for STDs (sexually transmitted diseases) if you're at risk.     Have a mammogram every 1 to 2 years.    Have a colonoscopy at age 50, or have a yearly FIT test (stool test). These exams screen for colon cancer.      Have a cholesterol test every 5 years, or more often if advised.    Have a diabetes test (fasting glucose) every three years. If you are at risk for diabetes, you should have this test more often.     If you are at risk for osteoporosis (brittle bone disease), think about having a bone density scan (DEXA).    Shots: Get a flu shot each year. Get a tetanus shot every 10 years.    Nutrition:     Eat at least 5 servings of fruits and vegetables each day.    Eat whole-grain bread, whole-wheat pasta and brown rice instead of white  grains and rice.    Talk to your provider about Calcium and Vitamin D.     Lifestyle    Exercise at least 150 minutes a week (30 minutes a day, 5 days a week). This will help you control your weight and prevent disease.    Limit alcohol to one drink per day.    No smoking.     Wear sunscreen to prevent skin cancer.     See your dentist every six months for an exam and cleaning.    See your eye doctor every 1 to 2 years.            Follow-ups after your visit        Additional Services     GENERAL SURG ADULT REFERRAL       Your provider has referred you to: SERGE: [facility] Morena Boyle [phone]   [email or web address]    Please be aware that coverage of these services is subject to the terms and limitations of your health insurance plan.  Call member services at your health plan with any benefit or coverage questions.      Please bring the following with you to your appointment:    (1) Any X-Rays, CTs or MRIs which have been performed.  Contact the facility where they were done to arrange for  prior to your scheduled appointment.   (2) List of current medications   (3) This referral request   (4) Any documents/labs given to you for this referral                  Future tests that were ordered for you today     Open Future Orders        Priority Expected Expires Ordered    MA Screening Digital Bilateral Routine  3/1/2019 3/1/2018            Who to contact     If you have questions or need follow up information about today's clinic visit or your schedule please contact [facility] directly at [phone].  Normal or non-critical lab and imaging results will be communicated to you by MyChart, letter or phone within 4 business days after the clinic has received the results. If you do not hear from us within 7 days, please contact the clinic through MyChart or phone. If you have a critical or abnormal lab result, we will notify you by phone as soon as  "possible.  Submit refill requests through [employer] or call your pharmacy and they will forward the refill request to us. Please allow 3 business days for your refill to be completed.          Additional Information About Your Visit        [employer] Information     [employer] gives you secure access to your electronic health record. If you see a primary care provider, you can also send messages to your care team and make appointments. If you have questions, please call your primary care clinic.  If you do not have a primary care provider, please call [phone] and they will assist you.        Care EveryWhere ID     This is your Care EveryWhere ID. This could be used by other organizations to access your [facility] medical records  [id number]        Your Vitals Were     Pulse Temperature Height Last Period Pulse Oximetry Breastfeeding?    70 97.7  F (36.5  C) (Oral) 5' 7\" (1.702 m) 06/20/2015 97% No    BMI (Body Mass Index)                   38.06 kg/m2            Blood Pressure from Last 3 Encounters:   03/01/18 119/83   06/01/17 120/72   01/09/17 110/80    Weight from Last 3 Encounters:   03/01/18 243 lb (110.2 kg)   06/01/17 (!) 309 lb 6.4 oz (140.3 kg)   01/09/17 (!) 302 lb (137 kg)              We Performed the Following     GENERAL SURG ADULT REFERRAL          Today's Medication Changes          These changes are accurate as of 3/1/18  3:56 PM.  If you have any questions, ask your nurse or doctor.               These medicines have changed or have updated prescriptions.        Dose/Directions    sertraline 100 MG tablet   Commonly known as:  ZOLOFT   This may have changed:  See the new instructions.   Used for:  Moderate episode of recurrent major depressive disorder (H)   Changed by:  Jessica Parham PA-C        Dose:  100 mg   Take 1 tablet (100 mg) by mouth daily   Quantity:  90 tablet   Refills:  3            Where to get your medicines      These medications were sent to [facility]/pharmacy #[id number] - [place], [place] " - [address] [place] AT [facility]  [address] [facility] [address]     Phone:  [phone]     buPROPion 150 MG 24 hr tablet    sertraline 100 MG tablet                Primary Care Provider Office Phone # Fax #    Jessica Parham PA-C [phone] [address] [facility] [address]  [facility] [address]        Equal Access to Services     ANAID MONACO : Hadii aad ku hadasho Soomaali, waaxda luqadaha, qaybta kaalmada adeegyada, waxay idiin hayaan adeeg kharash la'aan . So [facility] [phone].    ATENCIÓN: Si habla español, tiene a navarro disposición servicios gratuitos de asistencia lingüística. [facility] [phone].    We comply with applicable federal civil rights laws and [place] laws. We do not discriminate on the basis of race, color, national origin, age, disability, sex, sexual orientation, or gender identity.            Thank you!     Thank you for choosing [facility]  for your care. Our goal is always to provide you with excellent care. Hearing back from our patients is one way we can continue to improve our services. Please take a few minutes to complete the written survey that you may receive in the mail after your visit with us. Thank you!             Your Updated Medication List - Protect others around you: Learn how to safely use, store and throw away your medicines at www.disposemymeds.org.          This list is accurate as of 3/1/18  3:56 PM.  Always use your most recent med list.                   Brand Name Dispense Instructions for use Diagnosis    blood glucose calibration solution     1 Bottle    Use to check each new box of test strips for accuracy.    Type 2 diabetes, HbA1c goal < 7% (H)       blood glucose monitoring lancets     1 Box    Use to test blood sugar 1 time daily or as directed.    Type 2 diabetes, HbA1c goal < 7% (H)       buPROPion 150 MG 24 hr tablet    WELLBUTRIN XL    90 tablet    Take 1 tablet (150 mg) by mouth every  morning    Moderate episode of recurrent major depressive disorder (H)       CENTRUM SILVER per tablet      Take 1 tablet by mouth daily        CO Q 10 PO      Take by mouth daily        COLLAGEN PLUS VITAMIN C PO      Take 1 tablet by mouth daily        FREESTYLE LITE test strip   Generic drug:  blood glucose monitoring     50 each    USE TO TEST BLOOD SUGAR 1 TIMES DAILY OR AS DIRECTED (VARY TIMES BEFORE MEALS AND BED).    Type 2 diabetes mellitus without complications (H)       sertraline 100 MG tablet    ZOLOFT    90 tablet    Take 1 tablet (100 mg) by mouth daily    Moderate episode of recurrent major depressive disorder (H)       VITAMIN D (CHOLECALCIFEROL) PO      Take 2,000 Units by mouth daily

## 2018-03-01 NOTE — NURSING NOTE
"Chief Complaint   Patient presents with     Physical       Initial /83 (BP Location: Right arm, Patient Position: Chair, Cuff Size: Adult Large)  Pulse 70  Temp 97.7  F (36.5  C) (Oral)  Ht 5' 7\" (1.702 m)  Wt 243 lb (110.2 kg)  LMP 06/20/2015  SpO2 97%  Breastfeeding? No  BMI 38.06 kg/m2 Estimated body mass index is 38.06 kg/(m^2) as calculated from the following:    Height as of this encounter: 5' 7\" (1.702 m).    Weight as of this encounter: 243 lb (110.2 kg).  Medication Reconciliation: complete   Claudia Shin, [facility]    "

## 2018-03-01 NOTE — PROGRESS NOTES
SUBJECTIVE:   CC: Juliann Mitchell is an 52 year old woman who presents for medication refills of zoloft and wellbutrin  She feels her depression and anxiety well controlled.    She has been in [facility] for a few times for gallbladder attacks following her gastric sleeve surgery which was on 6/12/17 at [facility] by Dr. Noyola  She has oxycodone that she takes as needed  Her provider at [facility] prescribed Jesus on 12/8/17 but she didn't want to take that.  CareEverywhere results are reviewed.  This is random and not necessarily related to what she is eating.  [place] for spring break starting 3/28/18 and then [facility] for 2 weeks in July    She had an A1c of 5.6% on 12/22/17; she has been off of metformin since last year.    She had her pap last week with Dr. Burns    She has hx of colon polyps and due for colonoscopy.  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    Has hair loss related to her weight loss  She is able to tolerate all foods so her weight is going up and down    Past Medical History:   Diagnosis Date     Colon polyps      Depression, major, recurrent (H)      FH: colon cancer     father     Obesity     s/p lap band     Pituitary adenoma (H)     Resected age 16     Past Surgical History:   Procedure Laterality Date     Benign pituitary gland tumor removed      age 16     LAPAROSCOPIC GASTRIC ADJUSTABLE BANDING      [facility]     LAPAROSCOPIC GASTRIC SLEEVE  06/2017    Dr. Noyola at [facility]     Social History   Substance Use Topics     Smoking status: Never Smoker     Smokeless tobacco: Never Used     Alcohol use No     Current Outpatient Prescriptions   Medication Sig Dispense Refill     sertraline (ZOLOFT) 100 MG tablet Take 1 tablet (100 mg) by mouth daily 90 tablet 3     buPROPion (WELLBUTRIN XL) 150 MG 24 hr tablet Take 1 tablet (150 mg) by mouth every morning 90 tablet 1     Multiple Vitamins-Minerals (CENTRUM SILVER) per tablet Take 1 tablet by mouth daily        "Coenzyme Q10 (CO Q 10 PO) Take by mouth daily       FREESTYLE LITE test strip USE TO TEST BLOOD SUGAR 1 TIMES DAILY OR AS DIRECTED (VARY TIMES BEFORE MEALS AND BED). 50 each 11     Collagen-Vitamin C (COLLAGEN PLUS VITAMIN C PO) Take 1 tablet by mouth daily       VITAMIN D, CHOLECALCIFEROL, PO Take 2,000 Units by mouth daily       blood glucose monitoring (FREESTYLE) lancets Use to test blood sugar 1 time daily or as directed. 1 Box 3     blood glucose calibration (FREESTYLE CONTROL) solution Use to check each new box of test strips for accuracy. 1 Bottle 3     Allergies   Allergen Reactions     No Known Drug Allergy      FAMILY HISTORY NOTED AND REVIEWED    PHYSICAL EXAM:    /83 (BP Location: Right arm, Patient Position: Chair, Cuff Size: Adult Large)  Pulse 70  Temp 97.7  F (36.5  C) (Oral)  Ht 5' 7\" (1.702 m)  Wt 243 lb (110.2 kg)  LMP 06/20/2015  SpO2 97%  Breastfeeding? No  BMI 38.06 kg/m2    Patient appears non toxic  Pt has lost over 60lbs since last visit  Diffuse thinning of hair on head; no patchy loss.  Psych: approp affect and mood    Assessment and Plan:     (F33.1) Moderate episode of recurrent major depressive disorder (H)  (primary encounter diagnosis)  Comment: stable  Plan: sertraline (ZOLOFT) 100 MG tablet, buPROPion         (WELLBUTRIN XL) 150 MG 24 hr tablet            (K80.80) Biliary calculus of other site without obstruction  Comment:   Plan: GENERAL SURG ADULT REFERRAL            (E11.9) Type 2 diabetes mellitus without complication, without long-term current use of insulin (H)  Comment:   Plan: diet controlled s/p gastric sleeve surgery    (Z86.010) History of colonic polyps  Comment:   Plan: she agrees to do her colonoscopy once school is out.      (Z98.84) S/p bariatric surgery  Comment: she has lost almost 70lbs since last June  Plan: f/u with bariatric surgeon. Reviewed notes on Care Everywhere.    Spent 30 minutes FTF with patient of which over 50% was spent discussing " the coordination of care and management of their issues noted/reviewing outside records.      Jessica Parham PA-C

## 2018-03-02 ASSESSMENT — PATIENT HEALTH QUESTIONNAIRE - PHQ9: SUM OF ALL RESPONSES TO PHQ QUESTIONS 1-9: 0

## 2018-03-07 ENCOUNTER — OFFICE VISIT (OUTPATIENT)
Dept: SURGERY | Facility: CLINIC | Age: 53
End: 2018-03-07
Payer: COMMERCIAL

## 2018-03-07 ENCOUNTER — TELEPHONE (OUTPATIENT)
Dept: SURGERY | Facility: CLINIC | Age: 53
End: 2018-03-07

## 2018-03-07 VITALS
SYSTOLIC BLOOD PRESSURE: 130 MMHG | HEIGHT: 67 IN | DIASTOLIC BLOOD PRESSURE: 87 MMHG | BODY MASS INDEX: 37.67 KG/M2 | HEART RATE: 78 BPM | WEIGHT: 240 LBS

## 2018-03-07 DIAGNOSIS — K80.20 CALCULUS OF GALLBLADDER WITHOUT CHOLECYSTITIS WITHOUT OBSTRUCTION: Primary | ICD-10-CM

## 2018-03-07 PROCEDURE — 99204 OFFICE O/P NEW MOD 45 MIN: CPT | Performed by: SURGERY

## 2018-03-07 NOTE — MR AVS SNAPSHOT
"              After Visit Summary   3/7/2018    Juliann Mitchell    MRN: [id number]           Patient Information     Date Of Birth          1965        Visit Information        Provider Department      3/7/2018 2:15 PM Sandoval Acevedo MD [facility] General Surgery       Follow-ups after your visit        Who to contact     If you have questions or need follow up information about today's clinic visit or your schedule please contact [facility] directly at [phone].  Normal or non-critical lab and imaging results will be communicated to you by Sandlot Solutionshart, letter or phone within 4 business days after the clinic has received the results. If you do not hear from us within 7 days, please contact the clinic through United Mobilet or phone. If you have a critical or abnormal lab result, we will notify you by phone as soon as possible.  Submit refill requests through Timetric or call your pharmacy and they will forward the refill request to us. Please allow 3 business days for your refill to be completed.          Additional Information About Your Visit        MyChart Information     Timetric gives you secure access to your electronic health record. If you see a primary care provider, you can also send messages to your care team and make appointments. If you have questions, please call your primary care clinic.  If you do not have a primary care provider, please call [phone] and they will assist you.        Care EveryWhere ID     This is your Care EveryWhere ID. This could be used by other organizations to access your [place] medical records  [id number]        Your Vitals Were     Pulse Height Last Period BMI (Body Mass Index)          78 5' 7\" (1.702 m) 06/20/2015 37.59 kg/m2         Blood Pressure from Last 3 Encounters:   03/07/18 130/87   03/01/18 119/83   06/01/17 120/72    Weight from Last 3 Encounters:   03/07/18 240 lb (108.9 kg)   03/01/18 " 243 lb (110.2 kg)   06/01/17 (!) 309 lb 6.4 oz (140.3 kg)              Today, you had the following     No orders found for display       Primary Care Provider Office Phone # Fax #    Jessica Parham PA-C [phone] [address]   [place] [address]        Equal Access to Services     [facility] Valley[facility]AM : Hadii aad ku hadasho Soomaali, waaxda luqadaha, qaybta kaalmada adeegyada, waxay idiin hayaan adeeg kharash la'aan . So [facility] [phone].    ATENCIÓN: Si habla esperasto, tiene a navarro disposición servicios gratuitos de asistencia lingüística. Llame al [phone].    We comply with applicable federal civil rights laws and [place] laws. We do not discriminate on the basis of race, color, national origin, age, disability, sex, sexual orientation, or gender identity.            Thank you!     Thank you for choosing [facility]  for your care. Our goal is always to provide you with excellent care. Hearing back from our patients is one way we can continue to improve our services. Please take a few minutes to complete the written survey that you may receive in the mail after your visit with us. Thank you!             Your Updated Medication List - Protect others around you: Learn how to safely use, store and throw away your medicines at www.disposemymeds.org.          This list is accurate as of 3/7/18  2:48 PM.  Always use your most recent med list.                   Brand Name Dispense Instructions for use Diagnosis    blood glucose calibration solution     1 Bottle    Use to check each new box of test strips for accuracy.    Type 2 diabetes, HbA1c goal < 7% (H)       blood glucose monitoring lancets     1 Box    Use to test blood sugar 1 time daily or as directed.    Type 2 diabetes, HbA1c goal < 7% (H)       buPROPion 150 MG 24 hr tablet    WELLBUTRIN XL    90 tablet    Take 1 tablet (150 mg) by mouth every morning    Moderate episode of recurrent major depressive disorder (H)        CENTRUM SILVER per tablet      Take 1 tablet by mouth daily        CO Q 10 PO      Take by mouth daily        COLLAGEN PLUS VITAMIN C PO      Take 1 tablet by mouth daily        FREESTYLE LITE test strip   Generic drug:  blood glucose monitoring     50 each    USE TO TEST BLOOD SUGAR 1 TIMES DAILY OR AS DIRECTED (VARY TIMES BEFORE MEALS AND BED).    Type 2 diabetes mellitus without complications (H)       sertraline 100 MG tablet    ZOLOFT    90 tablet    Take 1 tablet (100 mg) by mouth daily    Moderate episode of recurrent major depressive disorder (H)       VITAMIN D (CHOLECALCIFEROL) PO      Take 2,000 Units by mouth daily

## 2018-03-07 NOTE — LETTER
"2018    Re: Juliann Mitchell - 1965    HPI: This is a 52-year-old female referred by the above-mentioned primary care provider for consultation regarding cholelithiasis.  She underwent band to sleep conversion last year and has since lost 75 pounds.  She has since developed right upper quadrant abdominal pain.  She does not note any specific triggers for these symptoms.  She has been evaluated with an abdominal ultrasound which revealed cholelithiasis.  She denies any change in her bowels. She denies tea-colored urine or pale stools.  She denies fevers or chills.     PMH:   has a past medical history of Cholelithiasis; Colon polyps; Depression, major, recurrent (H); FH: colon cancer; Obesity; and Pituitary adenoma (H).  PSH:    has a past surgical history that includes Laparoscopic gastric adjustable banding; Benign pituitary gland tumor removed; and Laparoscopic gastric sleeve (2017).  Social History:   reports that she has never smoked. She has never used smokeless tobacco. She reports that she does not drink alcohol or use illicit drugs.  Family History:  family history includes Cancer - colorectal in her father; DIABETES (age of onset: 50) in her father; Hypertension in her mother. There is no history of Breast Cancer or Ovarian Cancer.  Medications/Allergies: Home medications and allergies reviewed.     ROS:  The 10 point Review of Systems is negative other than noted in the HPI.     Physical Exam:  /87  Pulse 78  Ht 5' 7\" (1.702 m)  Wt 240 lb (108.9 kg)  LMP 2015  BMI 37.59 kg/m2  GENERAL: Generally appears well.  Psych: Alert and Oriented.  Normal affect  Eyes: Sclera clear  Respiratory:  Lungs clear to ausculation bilaterally with good air excursion  Cardiovascular:  Regular Rate and Rhythm with no murmurs gallops or rubs, normal peripheral pulses  GI: Abdomen Non Distended Non-Tender  No hernias palpated.  Lymphatic/Hematologic/Immune:  No femoral or cervical " lymphadenopathy.  Integumentary:  No rashes  Neurological: grossly intact     Ultrasound shows Cholelithiasis No gall bladder wall thicking   All new lab and imaging data was reviewed.      Impression and Plan:  Patient is a 52 year old female with cholelithiasis.     PLAN:   I discussed the pathophysiology of gallbladder disease and complications of cholecystitis and choledocholithiasis with the patient.  She has appropriate candidate for cholecystectomy.  This will be performed at her convenience.  I also discussed the risks associated with the procedure including, but not limited to infection, bleeding, conversion to open, bile leak, bile duct injury, retained gallstones, pneumonia, MI, and anesthesia complications with the patient.  I also discussed if a complication did occur it may require further surgical intervention during or after the procedure. The patient indicated understanding of the discussion, asked appropriate questions, and provided consent. I provided the patient an information pamphlet. I have recommended a low fat diet and instructed the patient to go to ER if they developed persistent pain, persistent nausea and vomiting, or yellowness of skin.        Thank you very much for this consult.     Sandoval Acevedo M.D.  [facility]  [phone]

## 2018-03-07 NOTE — TELEPHONE ENCOUNTER
Type of surgery: Laparoscopic cholecystectomy  Location of surgery: [facility]  Date and time of surgery: 3/9/18 at 12:05pm  Anesthesia: General  Surgical Soap: Given to patient  Surgeon: Dr. Sandoval Acevedo  Pre-Op Appt Date: Patient to schedule  Post-Op Appt Date: Patient to schedule   Packet sent out: Yes  Pre-cert/Authorization completed:  Not Applicable  Date: 3/7/18

## 2018-03-08 ENCOUNTER — OFFICE VISIT (OUTPATIENT)
Dept: FAMILY MEDICINE | Facility: CLINIC | Age: 53
End: 2018-03-08
Payer: COMMERCIAL

## 2018-03-08 VITALS
DIASTOLIC BLOOD PRESSURE: 84 MMHG | OXYGEN SATURATION: 99 % | HEIGHT: 67 IN | TEMPERATURE: 97.7 F | WEIGHT: 245 LBS | HEART RATE: 72 BPM | BODY MASS INDEX: 38.45 KG/M2 | SYSTOLIC BLOOD PRESSURE: 119 MMHG

## 2018-03-08 DIAGNOSIS — K80.80 BILIARY CALCULUS OF OTHER SITE WITHOUT OBSTRUCTION: ICD-10-CM

## 2018-03-08 DIAGNOSIS — Z01.818 PREOP GENERAL PHYSICAL EXAM: Primary | ICD-10-CM

## 2018-03-08 LAB — HGB BLD-MCNC: 13.3 G/DL (ref 11.7–15.7)

## 2018-03-08 PROCEDURE — 85018 HEMOGLOBIN: CPT | Performed by: PHYSICIAN ASSISTANT

## 2018-03-08 PROCEDURE — 99214 OFFICE O/P EST MOD 30 MIN: CPT | Performed by: PHYSICIAN ASSISTANT

## 2018-03-08 PROCEDURE — 36415 COLL VENOUS BLD VENIPUNCTURE: CPT | Performed by: PHYSICIAN ASSISTANT

## 2018-03-08 PROCEDURE — 93000 ELECTROCARDIOGRAM COMPLETE: CPT | Performed by: PHYSICIAN ASSISTANT

## 2018-03-08 NOTE — MR AVS SNAPSHOT
After Visit Summary   3/8/2018    Juliann Mitchell    MRN: [id number]           Patient Information     Date Of Birth          1965        Visit Information        Provider Department      3/8/2018 2:30 PM Jessica Parham PA-C [facility]        Today's Diagnoses     Preop general physical exam    -  1    Biliary calculus of other site without obstruction          Care Instructions      Before Your Surgery      Call your surgeon if there is any change in your health. This includes signs of a cold or flu (such as a sore throat, runny nose, cough, rash or fever).    Do not smoke, drink alcohol or take over the counter medicine (unless your surgeon or primary care doctor tells you to) for the 24 hours before and after surgery.    If you take prescribed drugs: Follow your doctor s orders about which medicines to take and which to stop until after surgery.    Eating and drinking prior to surgery: follow the instructions from your surgeon    Take a shower or bath the night before surgery. Use the soap your surgeon gave you to gently clean your skin. If you do not have soap from your surgeon, use your regular soap. Do not shave or scrub the surgery site.  Wear clean pajamas and have clean sheets on your bed.           Follow-ups after your visit        Your next 10 appointments already scheduled     Mar 09, 2018   Procedure with Sandoval Acevedo MD   [facility] (--)    [address]  [facility] [address]   [phone]            Mar 09, 2018 12:00 PM CST   [facility] Same Day Surgery with Sandoval Acevedo MD, Josefina Rincon PA-C   [facility] ([facility])    [facility] ([facility])   [phone]              Who to contact     If you have questions or need follow up information about today's clinic visit or your schedule please contact [facility] directly at [phone].  Normal or non-critical lab and imaging results will be communicated to you by MyChart, letter or phone within 4 business days after the clinic has received the results. If you do not hear from us within 7 days, please contact the clinic through Advactionhart or phone. If you have a critical or abnormal lab result, we will notify you by phone as soon as possible.  Submit refill requests through Binfire or call your pharmacy and they will forward the refill request to us. Please allow 3 business days for your refill to be completed.          Additional Information About Your Visit        AdvactionharJanalakshmi Information     B[employer]ire gives you secure access to your electronic health record. If you see a primary care provider, you can also send messages to your care team and make appointments. If you have questions, please call your primary care clinic.  If you do not have a primary care provider, please call [phone] and they will assist you.        Care EveryWhere ID     This is your Care EveryWhere ID. This could be used by other organizations to access your [place] medical records  [id number]        Your Vitals Were     Pulse Temperature Height Last Period Pulse Oximetry Breastfeeding?    72 97.7  F (36.5  C) (Tympanic) 5' 7\" (1.702 m) 06/20/2015 99% No    BMI (Body Mass Index)                   38.37 kg/m2            Blood Pressure from Last 3 Encounters:   03/08/18 119/84   03/07/18 130/87   03/01/18 119/83    Weight from Last 3 Encounters:   03/08/18 245 lb (111.1 kg)   03/07/18 240 lb (108.9 kg)   03/01/18 243 lb (110.2 kg)              We Performed the Following     EKG 12-lead complete w/read - Clinics          Today's Medication Changes          These changes are accurate as of 3/8/18  2:45 PM.  If you have any questions, ask your nurse or doctor.               Stop taking these medicines if you haven't already. Please contact your care team if you have questions.     CENTRUM " SILVER per tablet   Stopped by:  Jessica Parham PA-C                    Primary Care Provider Office Phone # Fax #    Jessica Parham PA-C [phone] [address] [facility] [address]  [place] [address]        Equal Access to Services     [facility] : Hadii aad ku hadasho Soomaali, waaxda luqadaha, qaybta kaalmada adeegyada, waxay idiin hayaan adeeg khanshush la'aan ah. So [facility] [phone].    ATENCIÓN: Si habla español, tiene a navarro disposición servicios gratuitos de asistencia lingüística. Llame al [phone].    We comply with applicable federal civil rights laws and [place] laws. We do not discriminate on the basis of race, color, national origin, age, disability, sex, sexual orientation, or gender identity.            Thank you!     Thank you for choosing [facility]  for your care. Our goal is always to provide you with excellent care. Hearing back from our patients is one way we can continue to improve our services. Please take a few minutes to complete the written survey that you may receive in the mail after your visit with us. Thank you!             Your Updated Medication List - Protect others around you: Learn how to safely use, store and throw away your medicines at www.disposemymeds.org.          This list is accurate as of 3/8/18  2:45 PM.  Always use your most recent med list.                   Brand Name Dispense Instructions for use Diagnosis    BIOTIN PO           blood glucose calibration solution     1 Bottle    Use to check each new box of test strips for accuracy.    Type 2 diabetes, HbA1c goal < 7% (H)       blood glucose monitoring lancets     1 Box    Use to test blood sugar 1 time daily or as directed.    Type 2 diabetes, HbA1c goal < 7% (H)       buPROPion 150 MG 24 hr tablet    WELLBUTRIN XL    90 tablet    Take 1 tablet (150 mg) by mouth every morning    Moderate episode of recurrent major depressive disorder (H)       CO Q 10 PO      Take by mouth daily         COLLAGEN PLUS VITAMIN C PO      Take 1 tablet by mouth daily        FLINTSTONES COMPLETE PO           FREESTYLE LITE test strip   Generic drug:  blood glucose monitoring     50 each    USE TO TEST BLOOD SUGAR 1 TIMES DAILY OR AS DIRECTED (VARY TIMES BEFORE MEALS AND BED).    Type 2 diabetes mellitus without complications (H)       omeprazole 20 MG CR capsule    priLOSEC     Take 20 mg by mouth        sertraline 100 MG tablet    ZOLOFT    90 tablet    Take 1 tablet (100 mg) by mouth daily    Moderate episode of recurrent major depressive disorder (H)       VITAMIN D (CHOLECALCIFEROL) PO      Take 2,000 Units by mouth daily

## 2018-03-08 NOTE — PROGRESS NOTES
"[facility]  Surgery Consultation    PCP:  Jessica Parham [phone]    HPI: This is a 52-year-old female referred by the above-mentioned primary care provider for consultation regarding cholelithiasis.  She underwent band to sleep conversion last year and has since lost 75 pounds.  She has since developed right upper quadrant abdominal pain.  She does not note any specific triggers for these symptoms.  She has been evaluated with an abdominal ultrasound which revealed cholelithiasis.  She denies any change in her bowels.  She denies tea-colored urine or pale stools.  She denies fevers or chills.    PMH:   has a past medical history of Cholelithiasis; Colon polyps; Depression, major, recurrent (H); FH: colon cancer; Obesity; and Pituitary adenoma (H).  PSH:    has a past surgical history that includes Laparoscopic gastric adjustable banding; Benign pituitary gland tumor removed; and Laparoscopic gastric sleeve (06/2017).  Social History:   reports that she has never smoked. She has never used smokeless tobacco. She reports that she does not drink alcohol or use illicit drugs.  Family History:  family history includes Cancer - colorectal in her father; DIABETES (age of onset: 50) in her father; Hypertension in her mother. There is no history of Breast Cancer or Ovarian Cancer.  Medications/Allergies: Home medications and allergies reviewed.    ROS:  The 10 point Review of Systems is negative other than noted in the HPI.    Physical Exam:  /87  Pulse 78  Ht 5' 7\" (1.702 m)  Wt 240 lb (108.9 kg)  LMP 06/20/2015  BMI 37.59 kg/m2  GENERAL: Generally appears well.  Psych: Alert and Oriented.  Normal affect  Eyes: Sclera clear  Respiratory:  Lungs clear to ausculation bilaterally with good air excursion  Cardiovascular:  Regular Rate and Rhythm with no murmurs gallops or rubs, normal peripheral pulses  GI: Abdomen Non Distended Non-Tender  No hernias palpated.  Lymphatic/Hematologic/Immune: "  No femoral or cervical lymphadenopathy.  Integumentary:  No rashes  Neurological: grossly intact    Ultrasound shows Cholelithiasis No gall bladder wall thicking   All new lab and imaging data was reviewed.     Impression and Plan:  Patient is a 52 year old female with cholelithiasis.    PLAN:   I discussed the pathophysiology of gallbladder disease and complications of cholecystitis and choledocholithiasis with the patient.  She has appropriate candidate for cholecystectomy.  This will be performed at her convenience.  I also discussed the risks associated with the procedure including, but not limited to infection, bleeding, conversion to open, bile leak, bile duct injury, retained gallstones, pneumonia, MI, and anesthesia complications with the patient.  I also discussed if a complication did occur it may require further surgical intervention during or after the procedure. The patient indicated understanding of the discussion, asked appropriate questions, and provided consent. I provided the patient an information pamphlet. I have recommended a low fat diet and instructed the patient to go to ER if they developed persistent pain, persistent nausea and vomiting, or yellowness of skin.      Thank you very much for this consult.    Sandoval Acevedo M.D.  [facility]  [phone]    Please route or send letter to:  Primary Care Provider (PCP) and Referring Provider

## 2018-03-08 NOTE — NURSING NOTE
"Chief Complaint   Patient presents with     Pre-Op Exam       Initial /84 (BP Location: Right arm, Cuff Size: Adult Large)  Pulse 72  Temp 97.7  F (36.5  C) (Tympanic)  Ht 5' 7\" (1.702 m)  Wt 245 lb (111.1 kg)  LMP 06/20/2015  SpO2 99%  Breastfeeding? No  BMI 38.37 kg/m2 Estimated body mass index is 38.37 kg/(m^2) as calculated from the following:    Height as of this encounter: 5' 7\" (1.702 m).    Weight as of this encounter: 245 lb (111.1 kg).  Medication Reconciliation: complete   Araceli Marshall MA      "

## 2018-03-08 NOTE — PROGRESS NOTES
[address] [address]  [phone]  Dept: [phone]    PRE-OP EVALUATION:  Today's date: 3/8/2018    Juliann Mitchell (: 1965) presents for pre-operative evaluation assessment as requested by Sandoval Gutierrez.  She requires evaluation and anesthesia risk assessment prior to undergoing surgery/procedure for treatment of cholelithiasis.    Proposed Surgery/ Procedure: LAPAROSCOPIC CHOLECYSTECTOMY   Date of Surgery/ Procedure: 3/9/2018  Time of Surgery/ Procedure: 12:05 PM  Hospital/Surgical Facility:  OR  Fax number for surgical facility: [phone]  Primary Physician: Jessica Parham PA-C  Type of Anesthesia Anticipated: General    Patient has a Health Care Directive or Living Will:  NO    1. NO - Do you have a history of heart attack, stroke, stent, bypass or surgery on an artery in the head, neck, heart or legs?  2. NO - Do you ever have any pain or discomfort in your chest?  3. NO - Do you have a history of  Heart Failure?  4. NO - Are you troubled by shortness of breath when: walking on the level, up a slight hill or at night?  5. NO - Do you currently have a cold, bronchitis or other respiratory infection?  6. NO - Do you have a cough, shortness of breath or wheezing?  7. NO - Do you sometimes get pains in the calves of your legs when you walk?  8. NO - Do you or anyone in your family have previous history of blood clots?  9. NO - Do you or does anyone in your family have a serious bleeding problem such as prolonged bleeding following surgeries or cuts?  10. NO - Have you ever had problems with anemia or been told to take iron pills?  11. NO - Have you had any abnormal blood loss such as black, tarry or bloody stools, or abnormal vaginal bleeding?  12. NO - Have you ever had a blood transfusion?  13. NO - Have you or any of your relatives ever had problems with anesthesia?  14. NO - Do you have sleep apnea, excessive snoring or daytime  drowsiness?  15. NO - Do you have any prosthetic heart valves?  16. NO - Do you have prosthetic joints?  17. NO - Is there any chance that you may be pregnant?      HPI:     HPI related to upcoming procedure: Pt s/p gastric sleeve who developed cholelithiasis and has had 4 gallbladder attacks since Oct 2017          MEDICAL HISTORY:     Patient Active Problem List    Diagnosis Date Noted     Cholelithiasis      Priority: Medium     Moderate episode of recurrent major depressive disorder (H) 08/15/2016     Priority: Medium     Type 2 diabetes mellitus without complications (H) 10/23/2015     Priority: Medium     Hyperlipidemia with target LDL less than 100 09/24/2015     Priority: Medium     Diagnosis updated by automated process. Provider to review and confirm.       Morbid obesity (H) 09/24/2015     Priority: Medium     Pituitary tumor 10/16/2013     Priority: Medium     Upper abdominal pain 10/16/2013     Priority: Medium     Hyperglycemia 03/28/2013     Priority: Medium     Hypocalcemia 03/28/2013     Priority: Medium     Vitamin D deficiency 03/28/2013     Priority: Medium     Problem list name updated by automated process. Provider to review and confirm  [employer] Update utility       Anemia 03/28/2013     Priority: Medium     Problem list name updated by automated process. Provider to review       Mild recurrent major depression (H) 05/31/2012     Priority: Medium      Past Medical History:   Diagnosis Date     Cholelithiasis      Colon polyps      Depression, major, recurrent (H)      FH: colon cancer     father     Obesity     s/p lap band     Pituitary adenoma (H)     Resected age 16     Past Surgical History:   Procedure Laterality Date     Benign pituitary gland tumor removed      age 16     LAPAROSCOPIC GASTRIC ADJUSTABLE BANDING      [facility]     LAPAROSCOPIC GASTRIC SLEEVE  06/2017    Dr. Noyola at [facility]     Current Outpatient Prescriptions   Medication Sig Dispense Refill     Pediatric  "Multivit-Minerals-C (FLINTSTONES COMPLETE PO)        BIOTIN PO        omeprazole (PRILOSEC) 20 MG CR capsule Take 20 mg by mouth       sertraline (ZOLOFT) 100 MG tablet Take 1 tablet (100 mg) by mouth daily 90 tablet 3     buPROPion (WELLBUTRIN XL) 150 MG 24 hr tablet Take 1 tablet (150 mg) by mouth every morning 90 tablet 1     Coenzyme Q10 (CO Q 10 PO) Take by mouth daily       FREESTYLE LITE test strip USE TO TEST BLOOD SUGAR 1 TIMES DAILY OR AS DIRECTED (VARY TIMES BEFORE MEALS AND BED). 50 each 11     Collagen-Vitamin C (COLLAGEN PLUS VITAMIN C PO) Take 1 tablet by mouth daily       VITAMIN D, CHOLECALCIFEROL, PO Take 2,000 Units by mouth daily       blood glucose monitoring (FREESTYLE) lancets Use to test blood sugar 1 time daily or as directed. 1 Box 3     blood glucose calibration (FREESTYLE CONTROL) solution Use to check each new box of test strips for accuracy. 1 Bottle 3     OTC products: None, except as noted above    No Known Allergies   Latex Allergy: NO    Social History   Substance Use Topics     Smoking status: Never Smoker     Smokeless tobacco: Never Used     Alcohol use No     History   Drug Use No       REVIEW OF SYSTEMS:   CONSTITUTIONAL: NEGATIVE for fever, chills, weight loss s/p gastric sleeve  ENT/MOUTH: NEGATIVE for ear, mouth and throat problems  RESP: NEGATIVE for significant cough or SOB  CV: NEGATIVE for chest pain, palpitations or peripheral edema  GI: +gallbladder attacks as noted  : NEGATIVE    EXAM:   /84 (BP Location: Right arm, Cuff Size: Adult Large)  Pulse 72  Temp 97.7  F (36.5  C) (Tympanic)  Ht 5' 7\" (1.702 m)  Wt 245 lb (111.1 kg)  LMP 06/20/2015  SpO2 99%  Breastfeeding? No  BMI 38.37 kg/m2    GENERAL APPEARANCE: healthy, alert and no distress     EYES: EOMI, PERRL     HENT: ear canals and TM's normal and nose and mouth without ulcers or lesions     NECK: no adenopathy, no asymmetry, masses, or scars and thyroid normal to palpation     RESP: lungs clear to " auscultation - no rales, rhonchi or wheezes     CV: regular rates and rhythm, normal S1 S2, no S3 or S4 and no murmur, click or rub     ABDOMEN:  soft, well healed incisions from gastric sleeve surgery, nontender, no HSM or masses and bowel sounds normal     MS: extremities normal- no gross deformities noted, no evidence of inflammation in joints, FROM in all extremities.     SKIN: no suspicious lesions or rashes     NEURO: Normal strength and tone, sensory exam grossly normal, mentation intact and speech normal     PSYCH: mentation appears normal. and affect normal/bright     LYMPHATICS: No cervical adenopathy    DIAGNOSTICS:     EKG today: Normal sinus rhythm  Results for orders placed or performed in visit on 03/08/18   Hemoglobin   Result Value Ref Range    Hemoglobin 13.3 11.7 - 15.7 g/dL       IMPRESSION:   Reason for surgery/procedure: cholelithiasis  Diagnosis/reason for consult: lap lizzie    The proposed surgical procedure is considered INTERMEDIATE risk.        ICD-10-CM    1. Preop general physical exam Z01.818 EKG 12-lead complete w/read - Clinics     CANCELED: Hemoglobin     CANCELED: Hemoglobin   2. Biliary calculus of other site without obstruction K80.80        RECOMMENDATIONS:       APPROVAL GIVEN to proceed with proposed procedure, without further diagnostic evaluation. She is not on asa or nsaids. She will only take wellbutrin the morning of surgery with a sip of water.       Signed Electronically by: Jessica Parham PA-C    Copy of this evaluation report is provided to requesting physician.    [place] Preop Guidelinesb

## 2018-03-09 ENCOUNTER — ANESTHESIA (OUTPATIENT)
Dept: SURGERY | Facility: CLINIC | Age: 53
End: 2018-03-09
Payer: COMMERCIAL

## 2018-03-09 ENCOUNTER — ANESTHESIA EVENT (OUTPATIENT)
Dept: SURGERY | Facility: CLINIC | Age: 53
End: 2018-03-09
Payer: COMMERCIAL

## 2018-03-09 ENCOUNTER — SURGERY (OUTPATIENT)
Age: 53
End: 2018-03-09

## 2018-03-09 ENCOUNTER — OFFICE VISIT (OUTPATIENT)
Dept: SURGERY | Facility: PHYSICIAN GROUP | Age: 53
End: 2018-03-09
Payer: COMMERCIAL

## 2018-03-09 ENCOUNTER — HOSPITAL ENCOUNTER (OUTPATIENT)
Facility: CLINIC | Age: 53
Discharge: HOME | End: 2018-03-09
Attending: SURGERY | Admitting: SURGERY
Payer: COMMERCIAL

## 2018-03-09 VITALS
BODY MASS INDEX: 38.25 KG/M2 | RESPIRATION RATE: 13 BRPM | DIASTOLIC BLOOD PRESSURE: 86 MMHG | WEIGHT: 243.7 LBS | OXYGEN SATURATION: 94 % | SYSTOLIC BLOOD PRESSURE: 129 MMHG | HEART RATE: 75 BPM | TEMPERATURE: 97.5 F | HEIGHT: 67 IN

## 2018-03-09 DIAGNOSIS — K80.10 CALCULUS OF GALLBLADDER WITH CHRONIC CHOLECYSTITIS WITHOUT OBSTRUCTION: Primary | ICD-10-CM

## 2018-03-09 PROCEDURE — 36000056 ZZH SURGERY LEVEL 3 1ST 30 MIN: Performed by: SURGERY

## 2018-03-09 PROCEDURE — 25000128 H RX IP 250 OP 636: Performed by: ANESTHESIOLOGY

## 2018-03-09 PROCEDURE — 71000027 ZZH RECOVERY PHASE 2 EACH 15 MINS: Performed by: SURGERY

## 2018-03-09 PROCEDURE — 27210995 ZZH RX 272: Performed by: SURGERY

## 2018-03-09 PROCEDURE — 25000125 ZZHC RX 250: Performed by: SURGERY

## 2018-03-09 PROCEDURE — 25000132 ZZH RX MED GY IP 250 OP 250 PS 637: Performed by: SURGERY

## 2018-03-09 PROCEDURE — 40000170 ZZH STATISTIC PRE-PROCEDURE ASSESSMENT II: Performed by: SURGERY

## 2018-03-09 PROCEDURE — 27210794 ZZH OR GENERAL SUPPLY STERILE: Performed by: SURGERY

## 2018-03-09 PROCEDURE — 25000125 ZZHC RX 250: Performed by: NURSE ANESTHETIST, CERTIFIED REGISTERED

## 2018-03-09 PROCEDURE — 47562 LAPAROSCOPIC CHOLECYSTECTOMY: CPT | Performed by: SURGERY

## 2018-03-09 PROCEDURE — 71000013 ZZH RECOVERY PHASE 1 LEVEL 1 EA ADDTL HR: Performed by: SURGERY

## 2018-03-09 PROCEDURE — 25000566 ZZH SEVOFLURANE, EA 15 MIN: Performed by: SURGERY

## 2018-03-09 PROCEDURE — 25800025 ZZH RX 258: Performed by: SURGERY

## 2018-03-09 PROCEDURE — 25000128 H RX IP 250 OP 636: Performed by: NURSE ANESTHETIST, CERTIFIED REGISTERED

## 2018-03-09 PROCEDURE — 37000009 ZZH ANESTHESIA TECHNICAL FEE, EACH ADDTL 15 MIN: Performed by: SURGERY

## 2018-03-09 PROCEDURE — 47562 LAPAROSCOPIC CHOLECYSTECTOMY: CPT | Mod: AS | Performed by: PHYSICIAN ASSISTANT

## 2018-03-09 PROCEDURE — 71000012 ZZH RECOVERY PHASE 1 LEVEL 1 FIRST HR: Performed by: SURGERY

## 2018-03-09 PROCEDURE — 36000058 ZZH SURGERY LEVEL 3 EA 15 ADDTL MIN: Performed by: SURGERY

## 2018-03-09 PROCEDURE — 88304 TISSUE EXAM BY PATHOLOGIST: CPT | Mod: 26 | Performed by: SURGERY

## 2018-03-09 PROCEDURE — 88304 TISSUE EXAM BY PATHOLOGIST: CPT | Performed by: SURGERY

## 2018-03-09 PROCEDURE — 37000008 ZZH ANESTHESIA TECHNICAL FEE, 1ST 30 MIN: Performed by: SURGERY

## 2018-03-09 RX ORDER — ONDANSETRON 2 MG/ML
4 INJECTION INTRAMUSCULAR; INTRAVENOUS EVERY 30 MIN PRN
Status: DISCONTINUED | OUTPATIENT
Start: 2018-03-09 | End: 2018-03-09 | Stop reason: HOSPADM

## 2018-03-09 RX ORDER — GLYCOPYRROLATE 0.2 MG/ML
INJECTION, SOLUTION INTRAMUSCULAR; INTRAVENOUS PRN
Status: DISCONTINUED | OUTPATIENT
Start: 2018-03-09 | End: 2018-03-09

## 2018-03-09 RX ORDER — BUPIVACAINE HYDROCHLORIDE AND EPINEPHRINE 2.5; 5 MG/ML; UG/ML
INJECTION, SOLUTION INFILTRATION; PERINEURAL PRN
Status: DISCONTINUED | OUTPATIENT
Start: 2018-03-09 | End: 2018-03-09 | Stop reason: HOSPADM

## 2018-03-09 RX ORDER — CEFAZOLIN SODIUM 1 G
1 VIAL (EA) INJECTION SEE ADMIN INSTRUCTIONS
Status: DISCONTINUED | OUTPATIENT
Start: 2018-03-09 | End: 2018-03-09 | Stop reason: HOSPADM

## 2018-03-09 RX ORDER — PROPOFOL 10 MG/ML
INJECTION, EMULSION INTRAVENOUS CONTINUOUS PRN
Status: DISCONTINUED | OUTPATIENT
Start: 2018-03-09 | End: 2018-03-09

## 2018-03-09 RX ORDER — NEOSTIGMINE METHYLSULFATE 1 MG/ML
VIAL (ML) INJECTION PRN
Status: DISCONTINUED | OUTPATIENT
Start: 2018-03-09 | End: 2018-03-09

## 2018-03-09 RX ORDER — MAGNESIUM HYDROXIDE 1200 MG/15ML
LIQUID ORAL PRN
Status: DISCONTINUED | OUTPATIENT
Start: 2018-03-09 | End: 2018-03-09 | Stop reason: HOSPADM

## 2018-03-09 RX ORDER — SODIUM CHLORIDE, SODIUM LACTATE, POTASSIUM CHLORIDE, CALCIUM CHLORIDE 600; 310; 30; 20 MG/100ML; MG/100ML; MG/100ML; MG/100ML
INJECTION, SOLUTION INTRAVENOUS CONTINUOUS
Status: DISCONTINUED | OUTPATIENT
Start: 2018-03-09 | End: 2018-03-09 | Stop reason: HOSPADM

## 2018-03-09 RX ORDER — PROPOFOL 10 MG/ML
INJECTION, EMULSION INTRAVENOUS PRN
Status: DISCONTINUED | OUTPATIENT
Start: 2018-03-09 | End: 2018-03-09

## 2018-03-09 RX ORDER — HYDROCODONE BITARTRATE AND ACETAMINOPHEN 5; 325 MG/1; MG/1
1-2 TABLET ORAL EVERY 4 HOURS PRN
Qty: 20 TABLET | Refills: 0 | Status: SHIPPED | OUTPATIENT
Start: 2018-03-09 | End: 2018-12-19

## 2018-03-09 RX ORDER — HYDROMORPHONE HYDROCHLORIDE 1 MG/ML
.3-.5 INJECTION, SOLUTION INTRAMUSCULAR; INTRAVENOUS; SUBCUTANEOUS EVERY 10 MIN PRN
Status: DISCONTINUED | OUTPATIENT
Start: 2018-03-09 | End: 2018-03-09 | Stop reason: HOSPADM

## 2018-03-09 RX ORDER — HYDROCODONE BITARTRATE AND ACETAMINOPHEN 5; 325 MG/1; MG/1
1 TABLET ORAL ONCE
Status: COMPLETED | OUTPATIENT
Start: 2018-03-09 | End: 2018-03-09

## 2018-03-09 RX ORDER — ONDANSETRON 4 MG/1
4 TABLET, ORALLY DISINTEGRATING ORAL EVERY 30 MIN PRN
Status: DISCONTINUED | OUTPATIENT
Start: 2018-03-09 | End: 2018-03-09 | Stop reason: HOSPADM

## 2018-03-09 RX ORDER — CEFAZOLIN SODIUM 2 G/100ML
2 INJECTION, SOLUTION INTRAVENOUS
Status: DISCONTINUED | OUTPATIENT
Start: 2018-03-09 | End: 2018-03-09 | Stop reason: HOSPADM

## 2018-03-09 RX ORDER — HYDROCODONE BITARTRATE AND ACETAMINOPHEN 5; 325 MG/1; MG/1
1 TABLET ORAL
Status: CANCELLED | OUTPATIENT
Start: 2018-03-09

## 2018-03-09 RX ORDER — FENTANYL CITRATE 0.05 MG/ML
25-50 INJECTION, SOLUTION INTRAMUSCULAR; INTRAVENOUS
Status: DISCONTINUED | OUTPATIENT
Start: 2018-03-09 | End: 2018-03-09 | Stop reason: HOSPADM

## 2018-03-09 RX ORDER — LIDOCAINE HYDROCHLORIDE 20 MG/ML
INJECTION, SOLUTION INFILTRATION; PERINEURAL PRN
Status: DISCONTINUED | OUTPATIENT
Start: 2018-03-09 | End: 2018-03-09

## 2018-03-09 RX ORDER — FENTANYL CITRATE 50 UG/ML
25-50 INJECTION, SOLUTION INTRAMUSCULAR; INTRAVENOUS
Status: DISCONTINUED | OUTPATIENT
Start: 2018-03-09 | End: 2018-03-09 | Stop reason: HOSPADM

## 2018-03-09 RX ORDER — DEXAMETHASONE SODIUM PHOSPHATE 4 MG/ML
INJECTION, SOLUTION INTRA-ARTICULAR; INTRALESIONAL; INTRAMUSCULAR; INTRAVENOUS; SOFT TISSUE PRN
Status: DISCONTINUED | OUTPATIENT
Start: 2018-03-09 | End: 2018-03-09

## 2018-03-09 RX ORDER — MEPERIDINE HYDROCHLORIDE 25 MG/ML
12.5 INJECTION INTRAMUSCULAR; INTRAVENOUS; SUBCUTANEOUS
Status: DISCONTINUED | OUTPATIENT
Start: 2018-03-09 | End: 2018-03-09 | Stop reason: HOSPADM

## 2018-03-09 RX ORDER — ONDANSETRON 2 MG/ML
INJECTION INTRAMUSCULAR; INTRAVENOUS PRN
Status: DISCONTINUED | OUTPATIENT
Start: 2018-03-09 | End: 2018-03-09

## 2018-03-09 RX ORDER — NALOXONE HYDROCHLORIDE 0.4 MG/ML
.1-.4 INJECTION, SOLUTION INTRAMUSCULAR; INTRAVENOUS; SUBCUTANEOUS
Status: DISCONTINUED | OUTPATIENT
Start: 2018-03-09 | End: 2018-03-09 | Stop reason: HOSPADM

## 2018-03-09 RX ORDER — FENTANYL CITRATE 50 UG/ML
INJECTION, SOLUTION INTRAMUSCULAR; INTRAVENOUS PRN
Status: DISCONTINUED | OUTPATIENT
Start: 2018-03-09 | End: 2018-03-09

## 2018-03-09 RX ADMIN — PROPOFOL 30 MG: 10 INJECTION, EMULSION INTRAVENOUS at 12:21

## 2018-03-09 RX ADMIN — FENTANYL CITRATE 50 MCG: 50 INJECTION, SOLUTION INTRAMUSCULAR; INTRAVENOUS at 12:59

## 2018-03-09 RX ADMIN — BUPIVACAINE HYDROCHLORIDE AND EPINEPHRINE BITARTRATE 30 ML: 2.5; .005 INJECTION, SOLUTION EPIDURAL; INFILTRATION; INTRACAUDAL; PERINEURAL at 12:44

## 2018-03-09 RX ADMIN — NEOSTIGMINE METHYLSULFATE 5 MG: 1 INJECTION INTRAMUSCULAR; INTRAVENOUS; SUBCUTANEOUS at 12:47

## 2018-03-09 RX ADMIN — LIDOCAINE HYDROCHLORIDE 40 MG: 20 INJECTION, SOLUTION INFILTRATION; PERINEURAL at 12:02

## 2018-03-09 RX ADMIN — ROCURONIUM BROMIDE 10 MG: 10 INJECTION INTRAVENOUS at 12:02

## 2018-03-09 RX ADMIN — GLYCOPYRROLATE 0.8 MG: 0.2 INJECTION, SOLUTION INTRAMUSCULAR; INTRAVENOUS at 12:47

## 2018-03-09 RX ADMIN — FENTANYL CITRATE 50 MCG: 50 INJECTION, SOLUTION INTRAMUSCULAR; INTRAVENOUS at 13:05

## 2018-03-09 RX ADMIN — DEXMEDETOMIDINE HYDROCHLORIDE 8 MCG: 100 INJECTION, SOLUTION INTRAVENOUS at 12:30

## 2018-03-09 RX ADMIN — SODIUM CHLORIDE, POTASSIUM CHLORIDE, SODIUM LACTATE AND CALCIUM CHLORIDE: 600; 310; 30; 20 INJECTION, SOLUTION INTRAVENOUS at 11:56

## 2018-03-09 RX ADMIN — SODIUM CHLORIDE 1000 ML: 900 IRRIGANT IRRIGATION at 12:23

## 2018-03-09 RX ADMIN — HYDROMORPHONE HYDROCHLORIDE 0.5 MG: 1 INJECTION, SOLUTION INTRAMUSCULAR; INTRAVENOUS; SUBCUTANEOUS at 13:32

## 2018-03-09 RX ADMIN — MIDAZOLAM 2 MG: 1 INJECTION INTRAMUSCULAR; INTRAVENOUS at 12:00

## 2018-03-09 RX ADMIN — PROPOFOL 100 MCG/KG/MIN: 10 INJECTION, EMULSION INTRAVENOUS at 12:02

## 2018-03-09 RX ADMIN — PROPOFOL 50 MG: 10 INJECTION, EMULSION INTRAVENOUS at 12:06

## 2018-03-09 RX ADMIN — ONDANSETRON 4 MG: 2 INJECTION INTRAMUSCULAR; INTRAVENOUS at 12:09

## 2018-03-09 RX ADMIN — FENTANYL CITRATE 50 MCG: 50 INJECTION, SOLUTION INTRAMUSCULAR; INTRAVENOUS at 12:26

## 2018-03-09 RX ADMIN — DEXAMETHASONE SODIUM PHOSPHATE 4 MG: 4 INJECTION, SOLUTION INTRA-ARTICULAR; INTRALESIONAL; INTRAMUSCULAR; INTRAVENOUS; SOFT TISSUE at 12:09

## 2018-03-09 RX ADMIN — PROPOFOL 200 MG: 10 INJECTION, EMULSION INTRAVENOUS at 12:02

## 2018-03-09 RX ADMIN — FENTANYL CITRATE 100 MCG: 50 INJECTION, SOLUTION INTRAMUSCULAR; INTRAVENOUS at 12:02

## 2018-03-09 RX ADMIN — SUCCINYLCHOLINE CHLORIDE 100 MG: 20 INJECTION, SOLUTION INTRAMUSCULAR; INTRAVENOUS; PARENTERAL at 12:02

## 2018-03-09 RX ADMIN — FENTANYL CITRATE 50 MCG: 50 INJECTION, SOLUTION INTRAMUSCULAR; INTRAVENOUS at 12:23

## 2018-03-09 RX ADMIN — HYDROCODONE BITARTRATE AND ACETAMINOPHEN 1 TABLET: 5; 325 TABLET ORAL at 14:06

## 2018-03-09 RX ADMIN — DEXMEDETOMIDINE HYDROCHLORIDE 8 MCG: 100 INJECTION, SOLUTION INTRAVENOUS at 12:35

## 2018-03-09 RX ADMIN — SODIUM CHLORIDE 1000 ML: 900 IRRIGANT IRRIGATION at 12:24

## 2018-03-09 NOTE — DISCHARGE INSTRUCTIONS
[facility]  Discharge Instructions: Post-Operative Laparoscopic Cholecystectomy    ACTIVITY    Expect to feel tired after your surgery.  This will gradually resolve.      Take frequent, short walks and increase your activity gradually.      Avoid strenuous physical activity or heavy lifting greater than 15-20 lbs. for 2-3 weeks.  You may climb stairs.    You may drive without restrictions when you are not using any prescription pain medication and feel comfortable in a car.    You may return to work/school when you are comfortable without any prescription pain medication.    WOUND CARE    You may remove your outer dressing or Band-Aids and shower 48 hours after the surgery.  Pat your incisions dry and leave them open to air.  Re-apply dressing (Band-Aids or gauze/tape) as needed for comfort or drainage.    You may have steri-strips (looks like white tape) on your incision.  You may peel off the steri-strips 2 weeks after your surgery if they have not peeled off on their own.     Do not soak your incisions in a tub or pool for 2 weeks.     Do not apply any lotions, creams, or ointments to your incisions.    A ridge under your incisions is normal and will gradually resolve.    DIET    Start with liquids, then gradually resume your regular diet as tolerated.  Avoid heavy, spicy, and greasy meals for 2-3 days.    Drink plenty of fluids to stay hydrated.    It is not uncommon to experience some loose stools or diarrhea after surgery.  This is your body s way of adapting to the bile which will slowly drain into your intestine.  A low fat diet may help with this.  This should improve over 1-2 months.    PAIN    Expect some tenderness and discomfort at the incision sites.  Use the prescribed pain medication at your discretion.  Expect gradual resolution of your pain over several days.    You may take ibuprofen with food (unless you have been told not to) instead of or in addition to  your prescribed pain medication.  If you are taking Norco or Percocet, do not take any additional acetaminophen/APAP/Tylenol.    Do not drink alcohol or drive while you are taking pain medications.    You may apply ice to your incisions in 20 minute intervals as needed for the next 48 hours.  After that time, consider switching to heat if you prefer.    EXPECTATIONS    Pain medications can cause constipation.  Limit use when possible.  Take over the counter stool softener/stimulant, such as Colace or Senna, 1-2 times a day with plenty of water.  You may take a mild over the counter laxative, such as Miralax or a suppository, as needed.  You may discontinue these medications once you are having regular bowel movements and/or are no longer taking your narcotic pain medication.      You may have shoulder or upper back discomfort due to the gas used in surgery.  This is temporary and should resolve in 48-72 hours.  Short, frequent walks may help with this.    FOLLOW UP    Our office will contact you approximately 2 weeks to check on your progress and answer any questions you may have.  If you are doing well, you will not need to return for a follow up appointment.  If any concerns are identified over the phone, we will help you make an appointment to see a provider.     If you have not received a phone call, have any questions or concerns, or would like to be seen, please call us at [phone] and ask to speak with our nurse.  We are located at [address] [place] [address].    CALL OUR OFFICE -[phone] IF YOU HAVE:     Chills or fever above 101 F.    Increased redness, warmth, or drainage at your incisions.    Significant bleeding.    Pain not relieved by your pain medication or rest.    Increasing pain after the first 48 hours.    Any other concerns or questions.    Revised January 2018    Same Day Surgery Discharge Instructions for  Sedation and General Anesthesia       It's not unusual  to feel dizzy, light-headed or faint for up to 24 hours after surgery or while taking pain medication.  If you have these symptoms: sit for a few minutes before standing and have someone assist you when you get up to walk or use the bathroom.      You should rest and relax for the next 24 hours. We recommend you make arrangements to have an adult stay with you for at least 24 hours after your discharge.  Avoid hazardous and strenuous activity.      DO NOT DRIVE any vehicle or operate mechanical equipment for 24 hours following the end of your surgery.  Even though you may feel normal, your reactions may be affected by the medication you have received.      Do not drink alcoholic beverages for 24 hours following surgery.       Slowly progress to your regular diet as you feel able. It's not unusual to feel nauseated and/or vomit after receiving anesthesia.  If you develop these symptoms, drink clear liquids (apple juice, ginger ale, broth, 7-up, etc. ) until you feel better.  If your nausea and vomiting persists for 24 hours, please notify your surgeon.        All narcotic pain medications, along with inactivity and anesthesia, can cause constipation. Drinking plenty of liquids and increasing fiber intake will help.      For any questions of a medical nature, call your surgeon.      Do not make important decisions for 24 hours.      If you had general anesthesia, you may have a sore throat for a couple of days related to the breathing tube used during surgery.  You may use Cepacol lozenges to help with this discomfort.  If it worsens or if you develop a fever, contact your surgeon.       If you feel your pain is not well managed with the pain medications prescribed by your surgeon, please contact your surgeon's office to let them know so they can address your concerns.     **If you have questions or concerns about your procedure,  call Dr. Acevedo at [phone]**

## 2018-03-09 NOTE — BRIEF OP NOTE
[facility] Brief Operative Note    Pre-operative diagnosis: cholelithiasis   Post-operative diagnosis Cholelithiasis     Procedure: Procedure(s):  LAPAROSCOPIC CHOLECYSTECTOMY  - Wound Class: II-Clean Contaminated   Surgeon(s): Surgeon(s) and Role:     * Sandoval Acevedo MD - Primary     * Yunier Avina PA-C - Assisting   Estimated blood loss: 5 mL    Specimens:   ID Type Source Tests Collected by Time Destination   A :  Tissue Gallbladder and Contents SURGICAL PATHOLOGY EXAM Sandoval Acevedo MD 3/9/2018 12:45 PM       Findings: See Operative Report for full details.  No complications noted.

## 2018-03-09 NOTE — ANESTHESIA POSTPROCEDURE EVALUATION
Patient: Juliann Mitchell    Procedure(s):  LAPAROSCOPIC CHOLECYSTECTOMY  - Wound Class: II-Clean Contaminated    Diagnosis:cholelithiasis  Diagnosis Additional Information: No value filed.    Anesthesia Type:  General, ETT, RSI    Note:  Anesthesia Post Evaluation    Patient location during evaluation: PACU  Patient participation: Able to fully participate in evaluation  Level of consciousness: awake  Pain management: adequate  Airway patency: patent  Cardiovascular status: acceptable  Respiratory status: acceptable  Hydration status: acceptable  PONV: none     Anesthetic complications: None          Last vitals:  Vitals:    03/09/18 1304 03/09/18 1315 03/09/18 1330   BP: 136/83 126/79 117/76   Pulse:      Resp: 18 13 14   Temp: 36.6  C (97.9  F) 36.2  C (97.2  F) 36.2  C (97.2  F)   SpO2: 96% 98% 95%         Electronically Signed By: Mallory Solano MD  March 9, 2018  1:41 PM

## 2018-03-09 NOTE — IP AVS SNAPSHOT
[place] [address]    [place] [address]    Phone:  [phone]                                       After Visit Summary   3/9/2018    Juliann Mitchell    MRN: [id number]           After Visit Summary Signature Page     I have received my discharge instructions, and my questions have been answered. I have discussed any challenges I see with this plan with the nurse or doctor.    ..........................................................................................................................................  Patient/Patient Representative Signature      ..........................................................................................................................................  Patient Representative Print Name and Relationship to Patient    ..................................................               ................................................  Date                                            Time    ..........................................................................................................................................  Reviewed by Signature/Title    ...................................................              ..............................................  Date                                                            Time

## 2018-03-09 NOTE — IP AVS SNAPSHOT
MRN:[id number]                      After Visit Summary   3/9/2018    Juliann Mitchell    MRN: [id number]           Thank you!     Thank you for choosing [facility] for your care. Our goal is always to provide you with excellent care. Hearing back from our patients is one way we can continue to improve our services. Please take a few minutes to complete the written survey that you may receive in the mail after you visit with us. Thank you!        Patient Information     Date Of Birth          1965        About your hospital stay     You were admitted on:  March 9, 2018 You last received care in the:  [facility] PACU    You were discharged on:  March 9, 2018       Who to Call     For medical emergencies, please call 911.  For non-urgent questions about your medical care, please call your primary care provider or clinic, [phone]  For questions related to your surgery, please call your surgery clinic        Attending Provider     Provider Specialty    Sandoval Aecvedo MD General Surgery       Primary Care Provider Office Phone # Fax #    Jessica Parham PA-C [phone]      Further instructions from your care team       [facility]  Discharge Instructions: Post-Operative Laparoscopic Cholecystectomy    ACTIVITY    Expect to feel tired after your surgery.  This will gradually resolve.      Take frequent, short walks and increase your activity gradually.      Avoid strenuous physical activity or heavy lifting greater than 15-20 lbs. for 2-3 weeks.  You may climb stairs.    You may drive without restrictions when you are not using any prescription pain medication and feel comfortable in a car.    You may return to work/school when you are comfortable without any prescription pain medication.    WOUND CARE    You may remove your outer dressing or Band-Aids and shower 48 hours after the surgery.  Pat your incisions dry and leave  them open to air.  Re-apply dressing (Band-Aids or gauze/tape) as needed for comfort or drainage.    You may have steri-strips (looks like white tape) on your incision.  You may peel off the steri-strips 2 weeks after your surgery if they have not peeled off on their own.     Do not soak your incisions in a tub or pool for 2 weeks.     Do not apply any lotions, creams, or ointments to your incisions.    A ridge under your incisions is normal and will gradually resolve.    DIET    Start with liquids, then gradually resume your regular diet as tolerated.  Avoid heavy, spicy, and greasy meals for 2-3 days.    Drink plenty of fluids to stay hydrated.    It is not uncommon to experience some loose stools or diarrhea after surgery.  This is your body s way of adapting to the bile which will slowly drain into your intestine.  A low fat diet may help with this.  This should improve over 1-2 months.    PAIN    Expect some tenderness and discomfort at the incision sites.  Use the prescribed pain medication at your discretion.  Expect gradual resolution of your pain over several days.    You may take ibuprofen with food (unless you have been told not to) instead of or in addition to your prescribed pain medication.  If you are taking Norco or Percocet, do not take any additional acetaminophen/APAP/Tylenol.    Do not drink alcohol or drive while you are taking pain medications.    You may apply ice to your incisions in 20 minute intervals as needed for the next 48 hours.  After that time, consider switching to heat if you prefer.    EXPECTATIONS    Pain medications can cause constipation.  Limit use when possible.  Take over the counter stool softener/stimulant, such as Colace or Senna, 1-2 times a day with plenty of water.  You may take a mild over the counter laxative, such as Miralax or a suppository, as needed.  You may discontinue these medications once you are having regular bowel movements and/or are no longer taking  your narcotic pain medication.      You may have shoulder or upper back discomfort due to the gas used in surgery.  This is temporary and should resolve in 48-72 hours.  Short, frequent walks may help with this.    FOLLOW UP    Our office will contact you approximately 2 weeks to check on your progress and answer any questions you may have.  If you are doing well, you will not need to return for a follow up appointment.  If any concerns are identified over the phone, we will help you make an appointment to see a provider.     If you have not received a phone call, have any questions or concerns, or would like to be seen, please call us at [phone] and ask to speak with our nurse.  We are located at [address] [place] [address].    CALL OUR OFFICE -[phone] IF YOU HAVE:     Chills or fever above 101 F.    Increased redness, warmth, or drainage at your incisions.    Significant bleeding.    Pain not relieved by your pain medication or rest.    Increasing pain after the first 48 hours.    Any other concerns or questions.    Revised January 2018    Same Day Surgery Discharge Instructions for  Sedation and General Anesthesia       It's not unusual to feel dizzy, light-headed or faint for up to 24 hours after surgery or while taking pain medication.  If you have these symptoms: sit for a few minutes before standing and have someone assist you when you get up to walk or use the bathroom.      You should rest and relax for the next 24 hours. We recommend you make arrangements to have an adult stay with you for at least 24 hours after your discharge.  Avoid hazardous and strenuous activity.      DO NOT DRIVE any vehicle or operate mechanical equipment for 24 hours following the end of your surgery.  Even though you may feel normal, your reactions may be affected by the medication you have received.      Do not drink alcoholic beverages for 24 hours following surgery.       Slowly progress to  "your regular diet as you feel able. It's not unusual to feel nauseated and/or vomit after receiving anesthesia.  If you develop these symptoms, drink clear liquids (apple juice, ginger ale, broth, 7-up, etc. ) until you feel better.  If your nausea and vomiting persists for 24 hours, please notify your surgeon.        All narcotic pain medications, along with inactivity and anesthesia, can cause constipation. Drinking plenty of liquids and increasing fiber intake will help.      For any questions of a medical nature, call your surgeon.      Do not make important decisions for 24 hours.      If you had general anesthesia, you may have a sore throat for a couple of days related to the breathing tube used during surgery.  You may use Cepacol lozenges to help with this discomfort.  If it worsens or if you develop a fever, contact your surgeon.       If you feel your pain is not well managed with the pain medications prescribed by your surgeon, please contact your surgeon's office to let them know so they can address your concerns.     **If you have questions or concerns about your procedure,  call Dr. Acevedo at [phone]**      Pending Results     Date and Time Order Name Status Description    3/9/2018 1246 Surgical pathology exam In process             Admission Information     Date & Time Provider Department Dept. Phone    3/9/2018 Sandoval Acevedo MD [facility] PACU [phone]      Your Vitals Were     Blood Pressure Pulse Temperature Respirations Height Weight    117/76 75 97.2  F (36.2  C) 14 1.702 m (5' 7\") 110.5 kg (243 lb 11.2 oz)    Last Period Pulse Oximetry BMI (Body Mass Index)             06/20/2015 95% 38.17 kg/m2         [facility] Information     [facility] gives you secure access to your electronic health record. If you see a primary care provider, you can also send messages to your care team and make appointments. If you have questions, please call your primary care clinic.  If you do not " have a primary care provider, please call [phone] and they will assist you.        Care EveryWhere ID     This is your Care EveryWhere ID. This could be used by other organizations to access your [facility] medical records  [id number]        Equal Access to Services     ANAID MONACO : Hadii aad ku hadsarinafletcher Mick, wagaetanoda luqadaha, qamarcelata kajean pierre kanetaty, waxkimo todd santoslinsey deananshuphyllis [place]. So [facility] [phone].    ATENCIÓN: Si habla español, tiene a navarro disposición servicios gratuitos de asistencia lingüística. Llame al [phone].    We comply with applicable federal civil rights laws and [place] laws. We do not discriminate on the basis of race, color, national origin, age, disability, sex, sexual orientation, or gender identity.               Review of your medicines      START taking        Dose / Directions    HYDROcodone-acetaminophen 5-325 MG per tablet   Commonly known as:  NORCO   Used for:  Calculus of gallbladder with chronic cholecystitis without obstruction   Notes to Patient:  You received 1 tablet at 2:05 pm.        Dose:  1-2 tablet   Take 1-2 tablets by mouth every 4 hours as needed for other (Moderate to Severe Pain)   Quantity:  20 tablet   Refills:  0         CONTINUE these medicines which have NOT CHANGED        Dose / Directions    BIOTIN PO        Refills:  0       blood glucose calibration solution   Used for:  Type 2 diabetes, HbA1c goal < 7% (H)        Use to check each new box of test strips for accuracy.   Quantity:  1 Bottle   Refills:  3       blood glucose monitoring lancets   Used for:  Type 2 diabetes, HbA1c goal < 7% (H)        Use to test blood sugar 1 time daily or as directed.   Quantity:  1 Box   Refills:  3       buPROPion 150 MG 24 hr tablet   Commonly known as:  WELLBUTRIN XL   Used for:  Moderate episode of recurrent major depressive disorder (H)        Dose:  150 mg   Take 1 tablet (150 mg) by mouth every morning   Quantity:  90 tablet   Refills:  1        FLINTSTONES COMPLETE PO        Refills:  0       FREESTYLE LITE test strip   Used for:  Type 2 diabetes mellitus without complications (H)   Generic drug:  blood glucose monitoring        USE TO TEST BLOOD SUGAR 1 TIMES DAILY OR AS DIRECTED (VARY TIMES BEFORE MEALS AND BED).   Quantity:  50 each   Refills:  11       omeprazole 20 MG CR capsule   Commonly known as:  priLOSEC        Dose:  20 mg   Take 20 mg by mouth   Refills:  0       sertraline 100 MG tablet   Commonly known as:  ZOLOFT   Used for:  Moderate episode of recurrent major depressive disorder (H)        Dose:  100 mg   Take 1 tablet (100 mg) by mouth daily   Quantity:  90 tablet   Refills:  3       VITAMIN D (CHOLECALCIFEROL) PO        Dose:  2000 Units   Take 2,000 Units by mouth daily   Refills:  0            Where to get your medicines      Some of these will need a paper prescription and others can be bought over the counter. Ask your nurse if you have questions.     Bring a paper prescription for each of these medications     HYDROcodone-acetaminophen 5-325 MG per tablet                Protect others around you: Learn how to safely use, store and throw away your medicines at www.disposemymeds.org.        Information about OPIOIDS     PRESCRIPTION OPIOIDS: WHAT YOU NEED TO KNOW    Prescription opioids can be used to help relieve moderate to severe pain and are often prescribed following a surgery or injury, or for certain health conditions. These medications can be an important part of treatment but also come with serious risks. It is important to work with your health care provider to make sure you are getting the safest, most effective care.    WHAT ARE THE RISKS AND SIDE EFFECTS OF OPIOID USE?  Prescription opioids carry serious risks of addiction and overdose, especially with prolonged use. An opioid overdose, often marked by slowed breathing can cause sudden death. The use of prescription opioids can have a number of side effects as well, even  when taken as directed:      Tolerance - meaning you might need to take more of a medication for the same pain relief    Physical dependence - meaning you have symptoms of withdrawal when a medication is stopped    Increased sensitivity to pain    Constipation    Nausea, vomiting, and dry mouth    Sleepiness and dizziness    Confusion    Depression    Low levels of testosterone that can result in lower sex drive, energy, and strength    Itching and sweating    RISKS ARE GREATER WITH:    History of drug misuse, substance use disorder, or overdose    Mental health conditions (such as depression or anxiety)    Sleep apnea    Older age (65 years or older)    Pregnancy    Avoid alcohol while taking prescription opioids.   Also, unless specifically advised by your health care provider, medications to avoid include:    Benzodiazepines (such as Xanax or Valium)    Muscle relaxants (such as Soma or Flexeril)    Hypnotics (such as Ambien or Lunesta)    Other prescription opioids    KNOW YOUR OPTIONS:  Talk to your health care provider about ways to manage your pain that do not involve prescription opioids. Some of these options may actually work better and have fewer risks and side effects:    Pain relievers such as acetaminophen, ibuprofen, and naproxen    Some medications that are also used for depression or seizures    Physical therapy and exercise    Cognitive behavioral therapy, a psychological, goal-directed approach, in which patients learn how to modify physical, behavioral, and emotional triggers of pain and stress    IF YOU ARE PRESCRIBED OPIOIDS FOR PAIN:    Never take opioids in greater amounts or more often than prescribed    Follow up with your primary health care provider and work together to create a plan on how to manage your pain.    Talk about ways to help manage your pain that do not involve prescription opioids    Talk about all concerns and side effects    Help prevent misuse and abuse    Never sell or  share prescription opioids    Never use another person's prescription opioids    Store prescription opioids in a secure place and out of reach of others (this may include visitors, children, friends, and family)    Visit www.cdc.gov/drugoverdose to learn about risks of opioid abuse and overdose    If you believe you may be struggling with addiction, tell your health care provider and ask for guidance or call [facility]'s National Helpline at 3-413-530-HELP    LEARN MORE / www.cdc.gov/drugoverdose/prescribing/guideline.html    Safely dispose of unused prescription opioids: Find your local drug take-back programs and more information about the importance of safe disposal at www.doseofreality.[place].gov             Medication List: This is a list of all your medications and when to take them. Check marks below indicate your daily home schedule. Keep this list as a reference.      Medications           Morning Afternoon Evening Bedtime As Needed    BIOTIN PO                                blood glucose calibration solution   Use to check each new box of test strips for accuracy.                                blood glucose monitoring lancets   Use to test blood sugar 1 time daily or as directed.                                buPROPion 150 MG 24 hr tablet   Commonly known as:  WELLBUTRIN XL   Take 1 tablet (150 mg) by mouth every morning                                FLINTSTONES COMPLETE PO                                FREESTYLE LITE test strip   USE TO TEST BLOOD SUGAR 1 TIMES DAILY OR AS DIRECTED (VARY TIMES BEFORE MEALS AND BED).   Generic drug:  blood glucose monitoring                                HYDROcodone-acetaminophen 5-325 MG per tablet   Commonly known as:  NORCO   Take 1-2 tablets by mouth every 4 hours as needed for other (Moderate to Severe Pain)   Last time this was given:  1 tablet on 3/9/2018  2:06 PM   Notes to Patient:  You received 1 tablet at 2:05 pm.                                omeprazole 20 MG CR  capsule   Commonly known as:  priLOSEC   Take 20 mg by mouth                                sertraline 100 MG tablet   Commonly known as:  ZOLOFT   Take 1 tablet (100 mg) by mouth daily                                VITAMIN D (CHOLECALCIFEROL) PO   Take 2,000 Units by mouth daily

## 2018-03-09 NOTE — OP NOTE
General Surgery Operative Note   PREOPERATIVE DIAGNOSIS: Symptomatic gallstones with prior history of bariatric surgery  POSTOPERATIVE DIAGNOSIS: Same  PROCEDURE: LAPAROSCOPIC CHOLECYSTECTOMY   ANESTHESIA: General.   PREOPERATIVE MEDICATIONS: Ancef IV.   SURGEON: Sandoval Acevedo MD   ASSISTANT: James Avina PA-C, Physician assistant first assistant was necessary during the performance of this procedure for expertise in patient positioning, prepping, draping, trocar placement, camera management, retraction and exposure, and suctioning.  INDICATIONS: Patient presented with signs and symptoms consistent with symptomatic gallstones.  She has a recent history of undergoing band to sleeve conversion.  She has lost 75 pounds since and has unfortunately developed biliary colic..  Extensive discussion was undertaken regarding treatment options.  We reviewed the procedure of cholecystectomy.  Risks including but not limited to bleeding, infection, bile duct or visceral injury were all reviewed in great detail.  The patient's questions were all answered to satisfaction.  The patient wishes to proceed.  PROCEDURE: After informed consent was obtained the patient was taken to the operating suite and uneventfully endotracheally intubated. The abdomen was prepped and draped in a sterile fashion. Surgeon initiated timeout was acknowledged. A stab incision was then made within the umbilicus through which a 5mm optical trocar was used to gain access to the abdominal cavity. A CO2 pneumoperitoneum was then created. An 11mm trocar was then placed in the subxiphoid position and a 5mmport was placed in the right subcostal position. We elevated the liver and were able to identify the gallbladder. The gallbladder was grasped and used to elevate the liver further. I began dissecting out some fatty adhesions down near the neck of the gallbladder until a cystic duct was encountered. I continued the dissection using combination of sharp and  blunt dissection until the cystic duct was largely dissected out. I continued the dissection up along the sides of the gallbladder, both medially and laterally, until I had created a space between the gallbladder and the liver. At this point, I encountered the cystic artery, just posterior and lateral to the cystic duct. This again was dissected out. Once I had created a window where only the cystic artery and duct were noted to be entering the gallbladder, I felt that this represented our critical view. The cystic artery and duct were then doubly clipped and divided.  The artery itself actually bifurcated within the gallbladder fossa.  Both branches were isolated and ligated with clips.  I continued the dissection up along the body of the gallbladder, freeing all attachments and adhesions of the gallbladder to the liver. Gallbladder was removed from the liver in an atraumatic fashion. The gallbladder was then brought up through the subxiphoid port site and removed from the abdomen. The gallbladder fossa was reinspected, and all areas of bleeding were managed with electrocautery. I irrigated the area with normal saline and aspirated it out. I then removed the umbilical port trocar. I then reinspected the abdomen, and everything appeared to be in pristine condition. I removed the trocars under laparoscopic visualization and desufflated the abdomen with the [place] suction . The skin edges were reapproximated with 4-0 Vicryl and Steri-Strips. The patient was uneventfully extubated, awakened and taken to the PACU in stable condition. At the conclusion of the case, all lap and needle counts were correct.   ESTIMATED BLOOD LOSS: 10cc   Sandoval Acevedo MD, MD

## 2018-03-09 NOTE — H&P (VIEW-ONLY)
[address] [address]  [phone]  Dept: [phone]    PRE-OP EVALUATION:  Today's date: 3/8/2018    Juliann Mitchell (: 1965) presents for pre-operative evaluation assessment as requested by Sandoval Gutierrez.  She requires evaluation and anesthesia risk assessment prior to undergoing surgery/procedure for treatment of cholelithiasis.    Proposed Surgery/ Procedure: LAPAROSCOPIC CHOLECYSTECTOMY   Date of Surgery/ Procedure: 3/9/2018  Time of Surgery/ Procedure: 12:05 PM  Hospital/Surgical Facility:  OR  Fax number for surgical facility: [phone]  Primary Physician: Jessica Parham PA-C  Type of Anesthesia Anticipated: General    Patient has a Health Care Directive or Living Will:  NO    1. NO - Do you have a history of heart attack, stroke, stent, bypass or surgery on an artery in the head, neck, heart or legs?  2. NO - Do you ever have any pain or discomfort in your chest?  3. NO - Do you have a history of  Heart Failure?  4. NO - Are you troubled by shortness of breath when: walking on the level, up a slight hill or at night?  5. NO - Do you currently have a cold, bronchitis or other respiratory infection?  6. NO - Do you have a cough, shortness of breath or wheezing?  7. NO - Do you sometimes get pains in the calves of your legs when you walk?  8. NO - Do you or anyone in your family have previous history of blood clots?  9. NO - Do you or does anyone in your family have a serious bleeding problem such as prolonged bleeding following surgeries or cuts?  10. NO - Have you ever had problems with anemia or been told to take iron pills?  11. NO - Have you had any abnormal blood loss such as black, tarry or bloody stools, or abnormal vaginal bleeding?  12. NO - Have you ever had a blood transfusion?  13. NO - Have you or any of your relatives ever had problems with anesthesia?  14. NO - Do you have sleep apnea, excessive snoring or daytime  drowsiness?  15. NO - Do you have any prosthetic heart valves?  16. NO - Do you have prosthetic joints?  17. NO - Is there any chance that you may be pregnant?      HPI:     HPI related to upcoming procedure: Pt s/p gastric sleeve who developed cholelithiasis and has had 4 gallbladder attacks since Oct 2017          MEDICAL HISTORY:     Patient Active Problem List    Diagnosis Date Noted     Cholelithiasis      Priority: Medium     Moderate episode of recurrent major depressive disorder (H) 08/15/2016     Priority: Medium     Type 2 diabetes mellitus without complications (H) 10/23/2015     Priority: Medium     Hyperlipidemia with target LDL less than 100 09/24/2015     Priority: Medium     Diagnosis updated by automated process. Provider to review and confirm.       Morbid obesity (H) 09/24/2015     Priority: Medium     Pituitary tumor 10/16/2013     Priority: Medium     Upper abdominal pain 10/16/2013     Priority: Medium     Hyperglycemia 03/28/2013     Priority: Medium     Hypocalcemia 03/28/2013     Priority: Medium     Vitamin D deficiency 03/28/2013     Priority: Medium     Problem list name updated by automated process. Provider to review and confirm  [employer] Update utility       Anemia 03/28/2013     Priority: Medium     Problem list name updated by automated process. Provider to review       Mild recurrent major depression (H) 05/31/2012     Priority: Medium      Past Medical History:   Diagnosis Date     Cholelithiasis      Colon polyps      Depression, major, recurrent (H)      FH: colon cancer     father     Obesity     s/p lap band     Pituitary adenoma (H)     Resected age 16     Past Surgical History:   Procedure Laterality Date     Benign pituitary gland tumor removed      age 16     LAPAROSCOPIC GASTRIC ADJUSTABLE BANDING      [facility]     LAPAROSCOPIC GASTRIC SLEEVE  06/2017    Dr. Noyola at [facility]     Current Outpatient Prescriptions   Medication Sig Dispense Refill     Pediatric  "Multivit-Minerals-C (FLINTSTONES COMPLETE PO)        BIOTIN PO        omeprazole (PRILOSEC) 20 MG CR capsule Take 20 mg by mouth       sertraline (ZOLOFT) 100 MG tablet Take 1 tablet (100 mg) by mouth daily 90 tablet 3     buPROPion (WELLBUTRIN XL) 150 MG 24 hr tablet Take 1 tablet (150 mg) by mouth every morning 90 tablet 1     Coenzyme Q10 (CO Q 10 PO) Take by mouth daily       FREESTYLE LITE test strip USE TO TEST BLOOD SUGAR 1 TIMES DAILY OR AS DIRECTED (VARY TIMES BEFORE MEALS AND BED). 50 each 11     Collagen-Vitamin C (COLLAGEN PLUS VITAMIN C PO) Take 1 tablet by mouth daily       VITAMIN D, CHOLECALCIFEROL, PO Take 2,000 Units by mouth daily       blood glucose monitoring (FREESTYLE) lancets Use to test blood sugar 1 time daily or as directed. 1 Box 3     blood glucose calibration (FREESTYLE CONTROL) solution Use to check each new box of test strips for accuracy. 1 Bottle 3     OTC products: None, except as noted above    No Known Allergies   Latex Allergy: NO    Social History   Substance Use Topics     Smoking status: Never Smoker     Smokeless tobacco: Never Used     Alcohol use No     History   Drug Use No       REVIEW OF SYSTEMS:   CONSTITUTIONAL: NEGATIVE for fever, chills, weight loss s/p gastric sleeve  ENT/MOUTH: NEGATIVE for ear, mouth and throat problems  RESP: NEGATIVE for significant cough or SOB  CV: NEGATIVE for chest pain, palpitations or peripheral edema  GI: +gallbladder attacks as noted  : NEGATIVE    EXAM:   /84 (BP Location: Right arm, Cuff Size: Adult Large)  Pulse 72  Temp 97.7  F (36.5  C) (Tympanic)  Ht 5' 7\" (1.702 m)  Wt 245 lb (111.1 kg)  LMP 06/20/2015  SpO2 99%  Breastfeeding? No  BMI 38.37 kg/m2    GENERAL APPEARANCE: healthy, alert and no distress     EYES: EOMI, PERRL     HENT: ear canals and TM's normal and nose and mouth without ulcers or lesions     NECK: no adenopathy, no asymmetry, masses, or scars and thyroid normal to palpation     RESP: lungs clear to " auscultation - no rales, rhonchi or wheezes     CV: regular rates and rhythm, normal S1 S2, no S3 or S4 and no murmur, click or rub     ABDOMEN:  soft, well healed incisions from gastric sleeve surgery, nontender, no HSM or masses and bowel sounds normal     MS: extremities normal- no gross deformities noted, no evidence of inflammation in joints, FROM in all extremities.     SKIN: no suspicious lesions or rashes     NEURO: Normal strength and tone, sensory exam grossly normal, mentation intact and speech normal     PSYCH: mentation appears normal. and affect normal/bright     LYMPHATICS: No cervical adenopathy    DIAGNOSTICS:     EKG today: Normal sinus rhythm  Results for orders placed or performed in visit on 03/08/18   Hemoglobin   Result Value Ref Range    Hemoglobin 13.3 11.7 - 15.7 g/dL       IMPRESSION:   Reason for surgery/procedure: cholelithiasis  Diagnosis/reason for consult: lap lizzie    The proposed surgical procedure is considered INTERMEDIATE risk.        ICD-10-CM    1. Preop general physical exam Z01.818 EKG 12-lead complete w/read - Clinics     CANCELED: Hemoglobin     CANCELED: Hemoglobin   2. Biliary calculus of other site without obstruction K80.80        RECOMMENDATIONS:       APPROVAL GIVEN to proceed with proposed procedure, without further diagnostic evaluation. She is not on asa or nsaids. She will only take wellbutrin the morning of surgery with a sip of water.       Signed Electronically by: Jessica Parham PA-C    Copy of this evaluation report is provided to requesting physician.    [place] Preop Guidelinesb

## 2018-03-09 NOTE — ANESTHESIA PREPROCEDURE EVALUATION
Anesthesia Evaluation     . Pt has had prior anesthetic.     No history of anesthetic complications          ROS/MED HX    ENT/Pulmonary:      (-) sleep apnea   Neurologic: Comment: H/o pituitary tumor - resected age 16      Cardiovascular:  - neg cardiovascular ROS       METS/Exercise Tolerance:  >4 METS   Hematologic:         Musculoskeletal:         GI/Hepatic: Comment: S/p gastric sleeve    (+) cholecystitis/cholelithiasis,      (-) GERD   Renal/Genitourinary:         Endo: Comment: DM resolved with weight loss    (+) Not using insulin Obesity, .   (-) Type II DM   Psychiatric:     (+) psychiatric history depression      Infectious Disease:         Malignancy:         Other:                     Physical Exam  Normal systems: dental    Airway   Mallampati: II  TM distance: >3 FB  Neck ROM: full    Dental     Cardiovascular   Rhythm and rate: regular and normal      Pulmonary    breath sounds clear to auscultation                    Anesthesia Plan      History & Physical Review  History and physical reviewed and following examination; no interval change.    ASA Status:  2 .    NPO Status:  > 8 hours    Plan for General, ETT and RSI with Intravenous induction. Maintenance will be Balanced.    PONV prophylaxis:  Ondansetron (or other 5HT-3) and Dexamethasone or Solumedrol  Background propofol      Postoperative Care      Consents  Anesthetic plan, risks, benefits and alternatives discussed with:  Patient..                          .

## 2018-03-09 NOTE — OR NURSING
Patient noted to have petechiae to left hand on arrival in PACU. Upon further inspection, also noted to have few petechaie up left arm to blood pressure cuff. Not painful or itchy. 2 lines of petechiae noted on each upper arm where blood pressure cuffs had been. No other areas noted on head, neck, trunk or legs. Dr. Burdick called to come assess area. No new orders. Patient aware, will watch area, would notify surgeon if increased areas noted.

## 2018-03-09 NOTE — ANESTHESIA CARE TRANSFER NOTE
Patient: Juliann Mitchell    Procedure(s):  LAPAROSCOPIC CHOLECYSTECTOMY  - Wound Class: II-Clean Contaminated    Diagnosis: cholelithiasis  Diagnosis Additional Information: No value filed.    Anesthesia Type:   General, ETT, RSI     Note:  Airway :Face Mask  Patient transferred to:PACU  Comments: VSS. Airway and IV patent. Patient comfortable. Report to RN. Stable care transfer.Handoff Report: Identifed the Patient, Identified the Reponsible Provider, Reviewed the pertinent medical history, Discussed the surgical course, Reviewed Intra-OP anesthesia mangement and issues during anesthesia, Set expectations for post-procedure period and Allowed opportunity for questions and acknowledgement of understanding      Vitals: (Last set prior to Anesthesia Care Transfer)    CRNA VITALS  3/9/2018 1230 - 3/9/2018 1307      3/9/2018             Pulse: 91    SpO2: 93 %    Resp Rate (set): 10    EKG: Sinus rhythm                Electronically Signed By: TOBY Echols CRNA  March 9, 2018  1:07 PM

## 2018-03-12 LAB — COPATH REPORT: NORMAL

## 2018-03-22 ENCOUNTER — TELEPHONE (OUTPATIENT)
Dept: SURGERY | Facility: CLINIC | Age: 53
End: 2018-03-22

## 2018-03-22 NOTE — TELEPHONE ENCOUNTER
[facility]  Post op call note - No Answer    Juliann RAMIREZ (Kris) Stephen was called for an update regarding her recovery.  There was no answer and a message was left requesting a return call IF any concerns.  Noted it was ok to leave detailed message on phone, so left pathology results, and worrisome findings/concerns.  She should call if any questions or concerns, or if she would just prefer to follow up in the clinic.    Yunier Avina PA-C

## 2018-03-25 DIAGNOSIS — F33.1 MODERATE EPISODE OF RECURRENT MAJOR DEPRESSIVE DISORDER (H): ICD-10-CM

## 2018-03-26 NOTE — TELEPHONE ENCOUNTER
"buPROPion (WELLBUTRIN XL) 150 MG 24 hr tablet 90 tablet 1 3/1/2018     Last Written Prescription Date:  3/1/18  Last Fill Quantity: 90,  # refills: 1   Last office visit: 3/8/2018 with prescribing provider:  Dione   Future Office Visit:  None    Requested Prescriptions   Pending Prescriptions Disp Refills     buPROPion (WELLBUTRIN XL) 150 MG 24 hr tablet [Pharmacy Med Name: BUPROPION HCL  MG TABLET] 90 tablet 1     Sig: TAKE 1 TABLET BY MOUTH EVERY MORNING    SSRIs Protocol Passed    3/25/2018 10:26 PM       Passed - PHQ-9 score less than 5 in past 6 months    Please review last PHQ-9 score.          Passed - Medication is Bupropion    If the medication is Bupropion (Wellbutrin), and the patient is taking for smoking cessation; OK to refill.         Passed - Patient is age 18 or older       Passed - No active pregnancy on record       Passed - No positive pregnancy test in last 12 months       Passed - Recent (6 mo) or future (30 days) visit within the authorizing provider's specialty    Patient had office visit in the last 6 months or has a visit in the next 30 days with authorizing provider or within the authorizing provider's specialty.  See \"Patient Info\" tab in inbasket, or \"Choose Columns\" in Meds & Orders section of the refill encounter.            PHQ-9 SCORE 7/13/2016 1/9/2017 3/1/2018   Total Score - - -   Total Score 5 3 0     "

## 2018-03-27 RX ORDER — BUPROPION HYDROCHLORIDE 150 MG/1
TABLET ORAL
Refills: 0
Start: 2018-03-27

## 2018-08-20 ENCOUNTER — TRANSFERRED RECORDS (OUTPATIENT)
Dept: HEALTH INFORMATION MANAGEMENT | Facility: CLINIC | Age: 53
End: 2018-08-20

## 2018-09-27 DIAGNOSIS — F33.1 MODERATE EPISODE OF RECURRENT MAJOR DEPRESSIVE DISORDER (H): ICD-10-CM

## 2018-09-27 RX ORDER — BUPROPION HYDROCHLORIDE 150 MG/1
TABLET ORAL
Qty: 90 TABLET | Refills: 0 | Status: SHIPPED | OUTPATIENT
Start: 2018-09-27 | End: 2018-12-19

## 2018-09-27 NOTE — TELEPHONE ENCOUNTER
"Requested Prescriptions   Pending Prescriptions Disp Refills     buPROPion (WELLBUTRIN XL) 150 MG 24 hr tablet [Pharmacy Med Name: BUPROPION HCL  MG TABLET] 90 tablet 1     Sig: TAKE 1 TABLET (150 MG) BY MOUTH EVERY MORNING    SSRIs Protocol Failed    9/27/2018  8:48 AM       Failed - PHQ-9 score less than 5 in past 6 months    Please review last PHQ-9 score.          Failed - Recent (6 mo) or future (30 days) visit within the authorizing provider's specialty    Patient had office visit in the last 6 months or has a visit in the next 30 days with authorizing provider or within the authorizing provider's specialty.  See \"Patient Info\" tab in inbasket, or \"Choose Columns\" in Meds & Orders section of the refill encounter.           Passed - Medication is Bupropion    If the medication is Bupropion (Wellbutrin), and the patient is taking for smoking cessation; OK to refill.         Passed - Patient is age 18 or older       Passed - No active pregnancy on record       Passed - No positive pregnancy test in last 12 months         buPROPion (WELLBUTRIN XL) 150 MG 24 hr tablet 90 tablet 1 3/1/2018       Last Written Prescription Date:  03/01/2018  Last Fill Quantity: 90,  # refills: 1   Last office visit: 3/8/2018 with prescribing provider:  Dr. Parham   Future Office Visit:  Unknown     "

## 2018-09-27 NOTE — TELEPHONE ENCOUNTER
PHQ-9 SCORE 7/13/2016 1/9/2017 3/1/2018   Total Score - - -   Total Score 5 3 0     Medication is being filled for 1 time refill only due to:  Due for 6 month fu for med

## 2018-12-19 ENCOUNTER — OFFICE VISIT (OUTPATIENT)
Dept: FAMILY MEDICINE | Facility: CLINIC | Age: 53
End: 2018-12-19
Payer: COMMERCIAL

## 2018-12-19 VITALS
BODY MASS INDEX: 39.08 KG/M2 | HEIGHT: 67 IN | TEMPERATURE: 98.2 F | OXYGEN SATURATION: 98 % | SYSTOLIC BLOOD PRESSURE: 115 MMHG | HEART RATE: 68 BPM | DIASTOLIC BLOOD PRESSURE: 81 MMHG | WEIGHT: 249 LBS

## 2018-12-19 DIAGNOSIS — R79.89 ELEVATED LFTS: ICD-10-CM

## 2018-12-19 DIAGNOSIS — I83.893 VARICOSE VEINS OF BOTH LEGS WITH EDEMA: ICD-10-CM

## 2018-12-19 DIAGNOSIS — K21.9 GASTROESOPHAGEAL REFLUX DISEASE, ESOPHAGITIS PRESENCE NOT SPECIFIED: ICD-10-CM

## 2018-12-19 DIAGNOSIS — Z98.84 S/P BARIATRIC SURGERY: ICD-10-CM

## 2018-12-19 DIAGNOSIS — Z00.00 ROUTINE GENERAL MEDICAL EXAMINATION AT A HEALTH CARE FACILITY: Primary | ICD-10-CM

## 2018-12-19 DIAGNOSIS — Z23 NEED FOR PROPHYLACTIC VACCINATION AND INOCULATION AGAINST INFLUENZA: ICD-10-CM

## 2018-12-19 DIAGNOSIS — F33.1 MODERATE EPISODE OF RECURRENT MAJOR DEPRESSIVE DISORDER (H): ICD-10-CM

## 2018-12-19 DIAGNOSIS — Z11.4 SCREENING FOR HIV (HUMAN IMMUNODEFICIENCY VIRUS): ICD-10-CM

## 2018-12-19 PROCEDURE — 80076 HEPATIC FUNCTION PANEL: CPT | Performed by: PHYSICIAN ASSISTANT

## 2018-12-19 PROCEDURE — 82947 ASSAY GLUCOSE BLOOD QUANT: CPT | Performed by: PHYSICIAN ASSISTANT

## 2018-12-19 PROCEDURE — 80061 LIPID PANEL: CPT | Performed by: PHYSICIAN ASSISTANT

## 2018-12-19 PROCEDURE — 90471 IMMUNIZATION ADMIN: CPT | Performed by: PHYSICIAN ASSISTANT

## 2018-12-19 PROCEDURE — 99396 PREV VISIT EST AGE 40-64: CPT | Mod: 25 | Performed by: PHYSICIAN ASSISTANT

## 2018-12-19 PROCEDURE — 87389 HIV-1 AG W/HIV-1&-2 AB AG IA: CPT | Performed by: PHYSICIAN ASSISTANT

## 2018-12-19 PROCEDURE — 36415 COLL VENOUS BLD VENIPUNCTURE: CPT | Performed by: PHYSICIAN ASSISTANT

## 2018-12-19 PROCEDURE — 90686 IIV4 VACC NO PRSV 0.5 ML IM: CPT | Performed by: PHYSICIAN ASSISTANT

## 2018-12-19 RX ORDER — SERTRALINE HYDROCHLORIDE 100 MG/1
100 TABLET, FILM COATED ORAL DAILY
Qty: 90 TABLET | Refills: 3 | Status: SHIPPED | OUTPATIENT
Start: 2018-12-19 | End: 2020-01-23

## 2018-12-19 ASSESSMENT — MIFFLIN-ST. JEOR: SCORE: 1767.09

## 2018-12-19 ASSESSMENT — PATIENT HEALTH QUESTIONNAIRE - PHQ9: SUM OF ALL RESPONSES TO PHQ QUESTIONS 1-9: 0

## 2018-12-19 NOTE — PROGRESS NOTES
Injectable Influenza Immunization Documentation    1.  Is the person to be vaccinated sick today?   No    2. Does the person to be vaccinated have an allergy to a component   of the vaccine?   No  Egg Allergy Algorithm Link    3. Has the person to be vaccinated ever had a serious reaction   to influenza vaccine in the past?   No    4. Has the person to be vaccinated ever had Guillain-Barré syndrome?   No    Form completed by Patient  Prior to injection, verified patient identity using patient's name and date of birth.  Due to injection administration, patient instructed to remain in clinic for 15 minutes  afterwards, and to report any adverse reaction to me immediately.    Bambi Forman MA

## 2018-12-19 NOTE — PROGRESS NOTES
SUBJECTIVE:   Juliann Mitchell is a 52 year old female who presents to clinic today for the following health issues:    Pt here for UC f/u which is discussed, sxs resolved. Had    SUBJECTIVE:   CC: Juliann Mitchell is an 52 year old woman who presents for preventive health visit.        She is s/p lap gastric sleeve and overall down 70lbs  See CareEverywhere for notes regarding urgent care visit for upper abd pain  They felt she may have a bile duct stone        Healthy Habits:    Do you get at least three servings of calcium containing foods daily (dairy, green leafy vegetables, etc.)? yes    Amount of exercise or daily activities, outside of work: 5 day(s) per week    Problems taking medications regularly No    Medication side effects: No    Have you had an eye exam in the past two years? yes    Do you see a dentist twice per year? yes    Do you have sleep apnea, excessive snoring or daytime drowsiness?no          Today's PHQ-2 Score:   PHQ-2 ( 1999 Pfizer) 12/19/2018 3/1/2018   Q1: Little interest or pleasure in doing things 0 0   Q2: Feeling down, depressed or hopeless 0 0   PHQ-2 Score 0 0   Q1: Little interest or pleasure in doing things - Not at all   Q2: Feeling down, depressed or hopeless - Not at all   PHQ-2 Score - 0       Abuse: Current or Past(Physical, Sexual or Emotional)- No  Do you feel safe in your environment? Yes    Social History     Tobacco Use     Smoking status: Never Smoker     Smokeless tobacco: Never Used   Substance Use Topics     Alcohol use: Yes     Alcohol/week: 0.0 oz     Comment: 1 drink per week     If you drink alcohol do you typically have >3 drinks per day or >7 drinks per week? No                     Reviewed orders with patient.  Reviewed health maintenance and updated orders accordingly - Yes      Pertinent mammograms are reviewed under the imaging tab.  History of abnormal Pap smear: NO - age 30- 65 PAP every 3 years recommended     Reviewed and updated as needed this visit  by clinical staff  Tobacco  Allergies  Meds  Med Hx  Surg Hx  Soc Hx        Reviewed and updated as needed this visit by Provider  Med Hx  Surg Hx        Past Medical History:   Diagnosis Date     Cholelithiasis      Colon polyps      Depression, major, recurrent (H)      FH: colon cancer     father     Obesity     s/p lap band     Pituitary adenoma (H)     Resected age 16     Past Surgical History:   Procedure Laterality Date     Benign pituitary gland tumor removed      age 16     c sections       LAPAROSCOPIC CHOLECYSTECTOMY N/A 3/9/2018    Procedure: LAPAROSCOPIC CHOLECYSTECTOMY;  LAPAROSCOPIC CHOLECYSTECTOMY ;  Surgeon: Sandoval Acevedo MD;  Location:  OR     LAPAROSCOPIC GASTRIC ADJUSTABLE BANDING      [facility]     LAPAROSCOPIC GASTRIC SLEEVE  06/2017    Dr. Noyola at [facility]     Current Outpatient Medications   Medication Sig Dispense Refill     omeprazole (PRILOSEC) 20 MG DR capsule Take 1 capsule (20 mg) by mouth daily 90 capsule 3     Pediatric Multivit-Minerals-C (FLINTSTONES COMPLETE PO)        sertraline (ZOLOFT) 100 MG tablet Take 1 tablet (100 mg) by mouth daily 90 tablet 3     VITAMIN D, CHOLECALCIFEROL, PO Take 2,000 Units by mouth daily           ROS:  CONSTITUTIONAL: NEGATIVE for fever, chills, change in weight  INTEGUMENTARY/SKIN: NEGATIVE for worrisome rashes, moles or lesions  EYES: NEGATIVE for vision changes or irritation  ENT: NEGATIVE for ear, mouth and throat problems  RESP: NEGATIVE for significant cough or SOB  BREAST: NEGATIVE for masses, tenderness or discharge  CV: NEGATIVE for chest pain, palpitations or peripheral edema  GI: NEGATIVE for nausea, abdominal pain, heartburn, or change in bowel habits  : NEGATIVE for unusual urinary or vaginal symptoms. No vaginal bleeding.  MUSCULOSKELETAL: NEGATIVE for significant arthralgias or myalgia  NEURO: NEGATIVE for weakness, dizziness or paresthesias  PSYCHIATRIC: NEGATIVE for changes in mood or affect     OBJECTIVE:  "  /81 (BP Location: Right arm, Cuff Size: Adult Large)   Pulse 68   Temp 98.2  F (36.8  C) (Oral)   Ht 1.702 m (5' 7\")   Wt 112.9 kg (249 lb)   LMP 06/20/2015   SpO2 98%   BMI 39.00 kg/m    EXAM:  GENERAL APPEARANCE: healthy, alert and no distress  EYES: Eyes grossly normal to inspection, PERRL and conjunctivae and sclerae normal  HENT: ear canals and TM's normal, nose and mouth without ulcers or lesions, oropharynx clear and oral mucous membranes moist  NECK: no adenopathy, no asymmetry, masses, or scars and thyroid normal to palpation  RESP: lungs clear to auscultation - no rales, rhonchi or wheezes  BREAST: done by gyn  CV: regular rate and rhythm, normal S1 S2, no S3 or S4, no murmur, click or rub, no peripheral edema and peripheral pulses strong  ABDOMEN: soft, nontender, no hepatosplenomegaly, no masses and bowel sounds normal  MS: no musculoskeletal defects are noted and gait is age appropriate without ataxia  SKIN: no suspicious lesions or rashes  NEURO: Normal strength and tone, sensory exam grossly normal, mentation intact and speech normal  PSYCH: mentation appears normal and affect normal/bright        ASSESSMENT/PLAN:   Assessment and Plan:     (Z00.00) Routine general medical examination at a health care facility  (primary encounter diagnosis)  Comment: mammogram is up to date. Pap done by gyn. Colonoscopy is up to date.  Plan: Glucose, lipids      (R94.5) Elevated LFTs  Comment:   Plan: Hepatic panel            (F33.1) Moderate episode of recurrent major depressive disorder (H)  Comment: stable on current dose.  Plan: sertraline (ZOLOFT) 100 MG tablet        refilled    (K21.9) Gastroesophageal reflux disease, esophagitis presence not specified  Comment: restart prilosec  Plan: omeprazole (PRILOSEC) 20 MG DR capsule            (I83.893) Varicose veins of both legs with edema  Comment:   Plan: DME REFERRAL            (Z98.84) S/P bariatric surgery  Comment:   Plan: She is not taking " "calcium and does not drink milk so should start a calcium supplement    (Z23) Need for prophylactic vaccination and inoculation against influenza  Comment:   Plan:     (Z11.4) Screening for HIV (human immunodeficiency virus)  Comment:   Plan: HIV Screening                COUNSELING:   Reviewed preventive health counseling, as reflected in patient instructions    BP Readings from Last 1 Encounters:   12/19/18 115/81     Estimated body mass index is 39 kg/m  as calculated from the following:    Height as of this encounter: 1.702 m (5' 7\").    Weight as of this encounter: 112.9 kg (249 lb).           reports that  has never smoked. she has never used smokeless tobacco.      Counseling Resources:  ATP IV Guidelines  Pooled Cohorts Equation Calculator  Breast Cancer Risk Calculator  FRAX Risk Assessment  ICSI Preventive Guidelines  Dietary Guidelines for Americans, 2010  USDA's MyPlate  ASA Prophylaxis  Lung CA Screening    Jessica Parham PA-C  [facility]  "

## 2018-12-19 NOTE — PATIENT INSTRUCTIONS

## 2018-12-20 LAB
ALBUMIN SERPL-MCNC: 4.2 G/DL (ref 3.4–5)
ALP SERPL-CCNC: 89 U/L (ref 40–150)
ALT SERPL W P-5'-P-CCNC: 46 U/L (ref 0–50)
AST SERPL W P-5'-P-CCNC: 15 U/L (ref 0–45)
BILIRUB DIRECT SERPL-MCNC: 0.1 MG/DL (ref 0–0.2)
BILIRUB SERPL-MCNC: 0.5 MG/DL (ref 0.2–1.3)
GLUCOSE SERPL-MCNC: 89 MG/DL (ref 70–99)
HIV 1+2 AB+HIV1 P24 AG SERPL QL IA: NONREACTIVE
PROT SERPL-MCNC: 7.3 G/DL (ref 6.8–8.8)

## 2018-12-20 NOTE — RESULT ENCOUNTER NOTE
Satnam,    Your liver functions are all completely normal.  Your HIV test was negative.  Your blood sugar was normal at 89.    The cholesterol is still pending.    Jessica Parham PA-C

## 2018-12-21 LAB
CHOLEST SERPL-MCNC: 278 MG/DL
HDLC SERPL-MCNC: 63 MG/DL
LDLC SERPL CALC-MCNC: 189 MG/DL
NONHDLC SERPL-MCNC: 215 MG/DL
TRIGL SERPL-MCNC: 130 MG/DL

## 2018-12-21 NOTE — RESULT ENCOUNTER NOTE
Satnam,    Your cholesterol is very high with a total cholesterol of 278.  Your LDL or bad cholesterol is too high at 189.  Please restart your statin and see me again in 4 months fasting for repeat of these labs.    Jessica Parham PA-C

## 2018-12-22 DIAGNOSIS — F33.1 MODERATE EPISODE OF RECURRENT MAJOR DEPRESSIVE DISORDER (H): ICD-10-CM

## 2018-12-22 NOTE — TELEPHONE ENCOUNTER
"buPROPion (WELLBUTRIN XL) 150 MG 24 hr tablet      Last Written Prescription Date:  -  Last Fill Quantity: -,   # refills: -  Last Office Visit: 12/19/2018 (Dione)  Future Office visit:       Routing refill request to provider for review/approval because:  Drug not active on patient's medication list    *Discontinued 12/19/18, Patient not taking: Reported on 12/19/2018    Requested Prescriptions   Pending Prescriptions Disp Refills     buPROPion (WELLBUTRIN XL) 150 MG 24 hr tablet [Pharmacy Med Name: BUPROPION HCL  MG TABLET] 90 tablet 0     Sig: TAKE 1 TABLET BY MOUTH EVERY MORNING    SSRIs Protocol Passed - 12/22/2018 12:40 AM       Passed - PHQ-9 score less than 5 in past 6 months    Please review last PHQ-9 score.          Passed - Medication is Bupropion    If the medication is Bupropion (Wellbutrin), and the patient is taking for smoking cessation; OK to refill.         Passed - Patient is age 18 or older       Passed - No active pregnancy on record       Passed - No positive pregnancy test in last 12 months       Passed - Recent (6 mo) or future (30 days) visit within the authorizing provider's specialty    Patient had office visit in the last 6 months or has a visit in the next 30 days with authorizing provider or within the authorizing provider's specialty.  See \"Patient Info\" tab in inbasket, or \"Choose Columns\" in Meds & Orders section of the refill encounter.            [facility] Follow-up to PHQ 1/9/2017 3/1/2018 12/19/2018   PHQ-9 9. Suicide Ideation past 2 weeks Not at all Not at all Not at all     No flowsheet data found.        "

## 2018-12-24 ENCOUNTER — MYC MEDICAL ADVICE (OUTPATIENT)
Dept: FAMILY MEDICINE | Facility: CLINIC | Age: 53
End: 2018-12-24

## 2018-12-24 DIAGNOSIS — E78.5 HYPERLIPIDEMIA LDL GOAL <130: Primary | ICD-10-CM

## 2018-12-24 RX ORDER — BUPROPION HYDROCHLORIDE 150 MG/1
TABLET ORAL
Qty: 90 TABLET | Refills: 0 | Status: SHIPPED | OUTPATIENT
Start: 2018-12-24 | End: 2019-07-18

## 2018-12-26 RX ORDER — ATORVASTATIN CALCIUM 10 MG/1
10 TABLET, FILM COATED ORAL DAILY
Qty: 90 TABLET | Refills: 1 | Status: SHIPPED | OUTPATIENT
Start: 2018-12-26 | End: 2019-07-01

## 2019-07-11 ENCOUNTER — TRANSFERRED RECORDS (OUTPATIENT)
Dept: HEALTH INFORMATION MANAGEMENT | Facility: CLINIC | Age: 54
End: 2019-07-11

## 2019-07-15 ENCOUNTER — TELEPHONE (OUTPATIENT)
Dept: FAMILY MEDICINE | Facility: CLINIC | Age: 54
End: 2019-07-15

## 2019-07-15 NOTE — TELEPHONE ENCOUNTER
Left message for pt to return call to clinic. Schedule appt with Jessica Parham for medication check/refills, elevated cholesterol and Hgb A1c.      Patient was due for f/up visit in April 2019 for medication check/refills elevated cholesterol and Hgb A1c.    Bambi Forman MA

## 2019-07-17 NOTE — TELEPHONE ENCOUNTER
Patient scheduled for appointment on 07/18/19 with Jessica Parham.    Herson Barone CMA on 7/17/2019 at 11:44 AM

## 2019-07-18 ENCOUNTER — OFFICE VISIT (OUTPATIENT)
Dept: FAMILY MEDICINE | Facility: CLINIC | Age: 54
End: 2019-07-18
Payer: COMMERCIAL

## 2019-07-18 ENCOUNTER — HOSPITAL ENCOUNTER (OUTPATIENT)
Dept: MAMMOGRAPHY | Facility: CLINIC | Age: 54
Discharge: HOME | End: 2019-07-18
Attending: OBSTETRICS & GYNECOLOGY | Admitting: OBSTETRICS & GYNECOLOGY
Payer: COMMERCIAL

## 2019-07-18 VITALS
BODY MASS INDEX: 41.44 KG/M2 | DIASTOLIC BLOOD PRESSURE: 77 MMHG | TEMPERATURE: 97.3 F | WEIGHT: 264 LBS | OXYGEN SATURATION: 97 % | SYSTOLIC BLOOD PRESSURE: 113 MMHG | HEART RATE: 80 BPM | HEIGHT: 67 IN

## 2019-07-18 DIAGNOSIS — E66.01 MORBID OBESITY (H): ICD-10-CM

## 2019-07-18 DIAGNOSIS — Z12.31 VISIT FOR SCREENING MAMMOGRAM: ICD-10-CM

## 2019-07-18 DIAGNOSIS — D49.7 PITUITARY TUMOR: ICD-10-CM

## 2019-07-18 DIAGNOSIS — F33.1 MODERATE EPISODE OF RECURRENT MAJOR DEPRESSIVE DISORDER (H): Primary | ICD-10-CM

## 2019-07-18 DIAGNOSIS — E78.5 HYPERLIPIDEMIA LDL GOAL <130: ICD-10-CM

## 2019-07-18 DIAGNOSIS — K21.9 GASTROESOPHAGEAL REFLUX DISEASE, ESOPHAGITIS PRESENCE NOT SPECIFIED: ICD-10-CM

## 2019-07-18 DIAGNOSIS — E11.9 TYPE 2 DIABETES MELLITUS WITHOUT COMPLICATION, WITHOUT LONG-TERM CURRENT USE OF INSULIN (H): ICD-10-CM

## 2019-07-18 DIAGNOSIS — Z98.84 S/P BARIATRIC SURGERY: ICD-10-CM

## 2019-07-18 LAB
HBA1C MFR BLD: 5.9 % (ref 0–5.6)
PROLACTIN SERPL-MCNC: 7 UG/L (ref 3–27)
VIT B12 SERPL-MCNC: 687 PG/ML (ref 193–986)

## 2019-07-18 PROCEDURE — 99207 C FOOT EXAM  NO CHARGE: CPT | Performed by: PHYSICIAN ASSISTANT

## 2019-07-18 PROCEDURE — 77067 SCR MAMMO BI INCL CAD: CPT

## 2019-07-18 PROCEDURE — 82728 ASSAY OF FERRITIN: CPT | Performed by: PHYSICIAN ASSISTANT

## 2019-07-18 PROCEDURE — 99214 OFFICE O/P EST MOD 30 MIN: CPT | Performed by: PHYSICIAN ASSISTANT

## 2019-07-18 PROCEDURE — 84443 ASSAY THYROID STIM HORMONE: CPT | Performed by: PHYSICIAN ASSISTANT

## 2019-07-18 PROCEDURE — 80053 COMPREHEN METABOLIC PANEL: CPT | Performed by: PHYSICIAN ASSISTANT

## 2019-07-18 PROCEDURE — 83036 HEMOGLOBIN GLYCOSYLATED A1C: CPT | Performed by: PHYSICIAN ASSISTANT

## 2019-07-18 PROCEDURE — 84146 ASSAY OF PROLACTIN: CPT | Performed by: PHYSICIAN ASSISTANT

## 2019-07-18 PROCEDURE — 36415 COLL VENOUS BLD VENIPUNCTURE: CPT | Performed by: PHYSICIAN ASSISTANT

## 2019-07-18 PROCEDURE — 82607 VITAMIN B-12: CPT | Performed by: PHYSICIAN ASSISTANT

## 2019-07-18 PROCEDURE — 80061 LIPID PANEL: CPT | Performed by: PHYSICIAN ASSISTANT

## 2019-07-18 RX ORDER — ATORVASTATIN CALCIUM 10 MG/1
10 TABLET, FILM COATED ORAL DAILY
Qty: 90 TABLET | Refills: 2 | Status: SHIPPED | OUTPATIENT
Start: 2019-07-18 | End: 2020-04-23

## 2019-07-18 ASSESSMENT — MIFFLIN-ST. JEOR: SCORE: 1830.13

## 2019-07-18 NOTE — Clinical Note
Please abstract the following data from this visit with this patient into the appropriate field in Epic:Tests that can be patient reported without a hard copy:Mammogram done on this date: 7- (approximately), by this group: [facility], results were Negative. Other Tests found in the patient's chart through Chart Review/Care Everywhere:Pap smear done by this group [facility]  on this date: 1-4-2017, negativeNote to [employer]straction: If this section is blank, no results were found via Chart Review/Care Everywhere.

## 2019-07-18 NOTE — PROGRESS NOTES
HPI: Satnam is a 52 yo female here for f/u diet controlled DM, hyperlipidemia, s/p gastric sleeve, pituitary tumor, gerd and depression.    She is s/p gastric sleeve in June 2017  She has gained wt in the past 6 months despite walking 5 days per week (15lbs)  She does modified keto and using a food track aminta to help her    Lab Results   Component Value Date    A1C 6.3 06/01/2017    A1C 6.4 01/09/2017    A1C 6.4 10/20/2016    A1C 7.0 07/08/2016    A1C 6.3 12/22/2015     She has appt for mammogram coming up  She goes to [facility]  They are no longer checking her prolactin so would like that done here today  She restarted lipitor for hyperlipidemia in Dec so fasting for recheck today of lipids  Prilosec is helping some with gerd, but still has burning in esophagus     Past Medical History:   Diagnosis Date     Cholelithiasis      Colon polyps      Depression, major, recurrent (H)      FH: colon cancer     father     Obesity     s/p lap band     Pituitary adenoma (H)     Resected age 16     Past Surgical History:   Procedure Laterality Date     Benign pituitary gland tumor removed      age 16     c sections       LAPAROSCOPIC CHOLECYSTECTOMY N/A 3/9/2018    Procedure: LAPAROSCOPIC CHOLECYSTECTOMY;  LAPAROSCOPIC CHOLECYSTECTOMY ;  Surgeon: Sandoval Acevedo MD;  Location:  OR     LAPAROSCOPIC GASTRIC ADJUSTABLE BANDING      [facility]     LAPAROSCOPIC GASTRIC SLEEVE  06/2017    Dr. Noyola at [facility]     Social History     Tobacco Use     Smoking status: Never Smoker     Smokeless tobacco: Never Used   Substance Use Topics     Alcohol use: Yes     Alcohol/week: 0.0 oz     Comment: 1 drink per week     Current Outpatient Medications   Medication Sig Dispense Refill     atorvastatin (LIPITOR) 10 MG tablet Take 1 tablet (10 mg) by mouth daily 90 tablet 2     omeprazole (PRILOSEC) 20 MG DR capsule Take 1 capsule (20 mg) by mouth daily 90 capsule 3     Pediatric Multivit-Minerals-C (FLINTSTONES COMPLETE PO)         "sertraline (ZOLOFT) 100 MG tablet Take 1 tablet (100 mg) by mouth daily 90 tablet 3     VITAMIN D, CHOLECALCIFEROL, PO Take 2,000 Units by mouth daily       No Known Allergies  FAMILY HISTORY NOTED AND REVIEWED    PHYSICAL EXAM:    /77 (BP Location: Right arm, Cuff Size: Adult Large)   Pulse 80   Temp 97.3  F (36.3  C) (Oral)   Ht 1.702 m (5' 7\")   Wt 119.7 kg (264 lb)   LMP 06/20/2015   SpO2 97%   BMI 41.35 kg/m      Patient appears non toxic    Assessment and Plan:     (F33.1) Moderate episode of recurrent major depressive disorder (H)  (primary encounter diagnosis)  Comment: stable on current dose of zoloft  Plan: cont same.    (E66.01) Morbid obesity (H)  Comment: s/p gastric sleeve  Plan: has gained 15lbs since Dec. Discussed [employer] and getting this back in check.    (Z98.84) S/P bariatric surgery  Comment:   Plan: Ferritin, Vitamin B12            (E78.5) Hyperlipidemia LDL goal <130  Comment:   Plan: Lipid Profile, atorvastatin (LIPITOR) 10 MG         tablet            (E11.9) Type 2 diabetes mellitus without complication, without long-term current use of insulin (H)  Comment:   Plan: TSH WITH FREE T4 REFLEX, HEMOGLOBIN A1C,         Comprehensive metabolic panel            (D49.7) Pituitary tumor  Comment:   Plan: Prolactin            (K21.9) Gastroesophageal reflux disease, esophagitis presence not specified  Comment:   Plan: Add zantac 150mg to bedtime, diet discussed    Patient Instructions   Shingrex is the new shingles vaccine; check with insurance first.    [address] mammogram.      Spent 30 minutes FTF with patient of which over 50% was spent discussing the coordination of care and management of their issues noted.        Jessica Parham PA-C            "

## 2019-07-19 LAB
ALBUMIN SERPL-MCNC: 3.8 G/DL (ref 3.4–5)
ALP SERPL-CCNC: 78 U/L (ref 40–150)
ALT SERPL W P-5'-P-CCNC: 29 U/L (ref 0–50)
ANION GAP SERPL CALCULATED.3IONS-SCNC: 9 MMOL/L (ref 3–14)
AST SERPL W P-5'-P-CCNC: 15 U/L (ref 0–45)
BILIRUB SERPL-MCNC: 0.4 MG/DL (ref 0.2–1.3)
BUN SERPL-MCNC: 15 MG/DL (ref 7–30)
CALCIUM SERPL-MCNC: 9 MG/DL (ref 8.5–10.1)
CHLORIDE SERPL-SCNC: 107 MMOL/L (ref 94–109)
CHOLEST SERPL-MCNC: 187 MG/DL
CO2 SERPL-SCNC: 24 MMOL/L (ref 20–32)
CREAT SERPL-MCNC: 0.58 MG/DL (ref 0.52–1.04)
FERRITIN SERPL-MCNC: 56 NG/ML (ref 8–252)
GFR SERPL CREATININE-BSD FRML MDRD: >90 ML/MIN/{1.73_M2}
GLUCOSE SERPL-MCNC: 89 MG/DL (ref 70–99)
HDLC SERPL-MCNC: 58 MG/DL
LDLC SERPL CALC-MCNC: 106 MG/DL
NONHDLC SERPL-MCNC: 129 MG/DL
POTASSIUM SERPL-SCNC: 3.9 MMOL/L (ref 3.4–5.3)
PROT SERPL-MCNC: 7.1 G/DL (ref 6.8–8.8)
SODIUM SERPL-SCNC: 140 MMOL/L (ref 133–144)
TRIGL SERPL-MCNC: 117 MG/DL
TSH SERPL DL<=0.005 MIU/L-ACNC: 1.81 MU/L (ref 0.4–4)

## 2019-07-19 NOTE — RESULT ENCOUNTER NOTE
It was a pleasure seeing you.  I wanted to get back to you with your test results.       I am happy to report that your vitamin levels and prolactin were normal.   Your chemistry panel shows no signs of diabetes.  Your A1c looks fine at 5.9%.  Your blood salts, kidney tests, liver tests, and proteins are all fine.    Your total cholesterol is 187 with the normal range being below 200.  Your HDL or good cholesterol is 58 with the normal range being above 50.  Your LDL or bad cholesterol is 106 with the normal range being below 130.  Much better!    The thyroid test is still pending.    Jessica Parham PA-C

## 2019-08-20 ENCOUNTER — TRANSFERRED RECORDS (OUTPATIENT)
Dept: HEALTH INFORMATION MANAGEMENT | Facility: CLINIC | Age: 54
End: 2019-08-20

## 2019-08-20 LAB — RETINOPATHY: NORMAL

## 2019-10-03 ENCOUNTER — HEALTH MAINTENANCE LETTER (OUTPATIENT)
Age: 54
End: 2019-10-03

## 2020-01-22 DIAGNOSIS — F33.1 MODERATE EPISODE OF RECURRENT MAJOR DEPRESSIVE DISORDER (H): ICD-10-CM

## 2020-01-23 RX ORDER — SERTRALINE HYDROCHLORIDE 100 MG/1
TABLET, FILM COATED ORAL
Qty: 90 TABLET | Refills: 0 | Status: SHIPPED | OUTPATIENT
Start: 2020-01-23 | End: 2020-02-26

## 2020-01-23 NOTE — TELEPHONE ENCOUNTER
"Pending Prescriptions:                       Disp   Refills    sertraline (ZOLOFT) 100 MG tablet [Pharma*90 tab*2            Sig: TAKE 1 TABLET BY MOUTH ONCE DAILY    Last Written Prescription Date:  12/19/2018  Last Fill Quantity: 90,  # refills: 3   Last office visit: 7/18/2019 with prescribing provider:  Jessica Parham   Future Office Visit:    Requested Prescriptions   Pending Prescriptions Disp Refills     sertraline (ZOLOFT) 100 MG tablet [Pharmacy Med Name: SERTRALINE HCL 100MG TABS] 90 tablet 2     Sig: TAKE 1 TABLET BY MOUTH ONCE DAILY       SSRIs Protocol Failed - 1/22/2020 10:28 PM        Failed - PHQ-9 score less than 5 in past 6 months     Please review last PHQ-9 score.           Failed - Recent (6 mo) or future (30 days) visit within the authorizing provider's specialty     Patient had office visit in the last 6 months or has a visit in the next 30 days with authorizing provider or within the authorizing provider's specialty.  See \"Patient Info\" tab in inbasket, or \"Choose Columns\" in Meds & Orders section of the refill encounter.            Passed - Medication is active on med list        Passed - Patient is age 18 or older        Passed - No active pregnancy on record        Passed - No positive pregnancy test in last 12 months          "

## 2020-01-23 NOTE — TELEPHONE ENCOUNTER
Routing refill request to provider for review/approval because:  Last PHQ-9 12/2018 [employer] message sent to pt (advising due for OV and asking to complete PHQ-9)    PHQ-9 SCORE 1/9/2017 3/1/2018 12/19/2018   PHQ-9 Total Score - - -   PHQ-9 Total Score 3 0 0

## 2020-02-08 ENCOUNTER — HEALTH MAINTENANCE LETTER (OUTPATIENT)
Age: 55
End: 2020-02-08

## 2020-02-26 ENCOUNTER — OFFICE VISIT (OUTPATIENT)
Dept: FAMILY MEDICINE | Facility: CLINIC | Age: 55
End: 2020-02-26
Payer: COMMERCIAL

## 2020-02-26 VITALS
HEIGHT: 67 IN | TEMPERATURE: 98.9 F | BODY MASS INDEX: 42.06 KG/M2 | OXYGEN SATURATION: 97 % | WEIGHT: 268 LBS | HEART RATE: 78 BPM | SYSTOLIC BLOOD PRESSURE: 120 MMHG | DIASTOLIC BLOOD PRESSURE: 84 MMHG

## 2020-02-26 DIAGNOSIS — Z23 NEED FOR PROPHYLACTIC VACCINATION AND INOCULATION AGAINST INFLUENZA: ICD-10-CM

## 2020-02-26 DIAGNOSIS — Z98.84 S/P BARIATRIC SURGERY: ICD-10-CM

## 2020-02-26 DIAGNOSIS — K21.9 GASTROESOPHAGEAL REFLUX DISEASE, ESOPHAGITIS PRESENCE NOT SPECIFIED: ICD-10-CM

## 2020-02-26 DIAGNOSIS — E11.9 TYPE 2 DIABETES MELLITUS WITHOUT COMPLICATION, WITHOUT LONG-TERM CURRENT USE OF INSULIN (H): ICD-10-CM

## 2020-02-26 DIAGNOSIS — F33.1 MODERATE EPISODE OF RECURRENT MAJOR DEPRESSIVE DISORDER (H): Primary | ICD-10-CM

## 2020-02-26 DIAGNOSIS — E66.01 MORBID OBESITY (H): ICD-10-CM

## 2020-02-26 LAB — HBA1C MFR BLD: 5.9 % (ref 0–5.6)

## 2020-02-26 PROCEDURE — 36415 COLL VENOUS BLD VENIPUNCTURE: CPT | Performed by: PHYSICIAN ASSISTANT

## 2020-02-26 PROCEDURE — 83036 HEMOGLOBIN GLYCOSYLATED A1C: CPT | Performed by: PHYSICIAN ASSISTANT

## 2020-02-26 PROCEDURE — 90471 IMMUNIZATION ADMIN: CPT | Performed by: PHYSICIAN ASSISTANT

## 2020-02-26 PROCEDURE — 80048 BASIC METABOLIC PNL TOTAL CA: CPT | Performed by: PHYSICIAN ASSISTANT

## 2020-02-26 PROCEDURE — 99214 OFFICE O/P EST MOD 30 MIN: CPT | Mod: 25 | Performed by: PHYSICIAN ASSISTANT

## 2020-02-26 PROCEDURE — 90686 IIV4 VACC NO PRSV 0.5 ML IM: CPT | Performed by: PHYSICIAN ASSISTANT

## 2020-02-26 RX ORDER — PANTOPRAZOLE SODIUM 40 MG/1
40 TABLET, DELAYED RELEASE ORAL DAILY
Qty: 90 TABLET | Refills: 1 | Status: SHIPPED | OUTPATIENT
Start: 2020-02-26 | End: 2020-06-11

## 2020-02-26 ASSESSMENT — MIFFLIN-ST. JEOR: SCORE: 1843.27

## 2020-02-26 NOTE — PROGRESS NOTES
"HPI: Satnam is a 55 yo female here for f/u depression and diet controlled diabetes  She has obesity with hx of gastric sleeve surgery in 2017  She has gained weight and doesn't want to know how much she currently weighs  She is not focusing on protein like she knows she should  Son Alex now living with them now that done with college and working at [employer] as an agent.      Depression: She has decreased her dose of zoloft to 50mg over the summer, but went back to 100mg when feeling \"more snippy\" and that helped.  She feels a bit too numb on 100mg so would like an in between dose.    GERD: She is feeling acid reflux despite taking prilosec 20mg daily  She feels irritated in the esophagus  She is trying intermittent fasting but does drink coffee in the morning  Denies etoh or nsaid use.  She has tried intermittent fasting over the past few months  She states she has a poor appetite  She is having normal BMs.    Past Medical History:   Diagnosis Date     Cholelithiasis      Colon polyps      Depression, major, recurrent (H)      FH: colon cancer     father     Obesity     s/p lap band and then gastric sleeve in 2017     Pituitary adenoma (H)     Resected age 16     Past Surgical History:   Procedure Laterality Date     Benign pituitary gland tumor removed      age 16     c sections       LAPAROSCOPIC CHOLECYSTECTOMY N/A 3/9/2018    Procedure: LAPAROSCOPIC CHOLECYSTECTOMY;  LAPAROSCOPIC CHOLECYSTECTOMY ;  Surgeon: Sandoval Acevedo MD;  Location:  OR     LAPAROSCOPIC GASTRIC ADJUSTABLE BANDING      [facility]     LAPAROSCOPIC GASTRIC SLEEVE  06/2017    Dr. Noyola at [facility]     Social History     Tobacco Use     Smoking status: Never Smoker     Smokeless tobacco: Never Used   Substance Use Topics     Alcohol use: Yes     Alcohol/week: 0.0 standard drinks     Comment: 1 drink per week     Current Outpatient Medications   Medication Sig Dispense Refill     atorvastatin (LIPITOR) 10 MG tablet Take 1 tablet (10 " "mg) by mouth daily 90 tablet 2     pantoprazole (PROTONIX) 40 MG EC tablet Take 1 tablet (40 mg) by mouth daily 90 tablet 1     Pediatric Multivit-Minerals-C (FLINTSTONES COMPLETE PO)        sertraline (ZOLOFT) 50 MG tablet Take 1.5 tablets (75 mg) by mouth daily 135 tablet 1     VITAMIN D, CHOLECALCIFEROL, PO Take 2,000 Units by mouth daily       No Known Allergies  FAMILY HISTORY NOTED AND REVIEWED      PHYSICAL EXAM:    /84   Pulse 78   Temp 98.9  F (37.2  C) (Tympanic)   Ht 1.702 m (5' 7\")   Wt 121.6 kg (268 lb)   LMP 06/20/2015   SpO2 97%   Breastfeeding No   BMI 41.97 kg/m      Patient appears non toxic    Assessment and Plan:     (F33.1) Moderate episode of recurrent major depressive disorder (H)  (primary encounter diagnosis)  Comment: she feels like zoloft 50mg too low and 100mg too much. Recd she try 75mg every day.  Plan: sertraline (ZOLOFT) 50 MG tablet, Hemoglobin         A1c, Basic metabolic panel            (K21.9) Gastroesophageal reflux disease, esophagitis presence not specified  Comment: discontinue prilosec as not working and try protonix.  Avoid fasting, recd 3 small meals per day with focus on protein.  Add pepcid at bedtime if needed  Plan: pantoprazole (PROTONIX) 40 MG EC tablet           (E11.9) Type 2 diabetes mellitus without complication, without long-term current use of insulin (H)  Comment: return to clinic for px in July.  Plan: diet controlled. Weight is up however.     (E66.01) Morbid obesity (H)  Comment: s/p gastric sleeve surgery in 2017  Plan: recd she f/u with bariatric surgeon    (Z98.84) S/P bariatric surgery  Comment:   Plan: she is mostly compliant with her vitamins    (Z23) Need for prophylactic vaccination and inoculation against influenza  Comment:   Plan: INFLUENZA VACCINE IM > 6 MONTHS VALENT IIV4         [05350], Vaccine Administration, Initial         [94370]              Jessica Parham PA-C        "

## 2020-02-26 NOTE — PATIENT INSTRUCTIONS
Change the prilosec to protonix for GI issues.   If that is not helping you could add a PM Pepcid 20mg.

## 2020-02-27 LAB
ANION GAP SERPL CALCULATED.3IONS-SCNC: 5 MMOL/L (ref 3–14)
BUN SERPL-MCNC: 15 MG/DL (ref 7–30)
CALCIUM SERPL-MCNC: 9.1 MG/DL (ref 8.5–10.1)
CHLORIDE SERPL-SCNC: 105 MMOL/L (ref 94–109)
CO2 SERPL-SCNC: 27 MMOL/L (ref 20–32)
CREAT SERPL-MCNC: 0.67 MG/DL (ref 0.52–1.04)
GFR SERPL CREATININE-BSD FRML MDRD: >90 ML/MIN/{1.73_M2}
GLUCOSE SERPL-MCNC: 99 MG/DL (ref 70–99)
POTASSIUM SERPL-SCNC: 4.3 MMOL/L (ref 3.4–5.3)
SODIUM SERPL-SCNC: 137 MMOL/L (ref 133–144)

## 2020-02-27 NOTE — RESULT ENCOUNTER NOTE
Satnam,    Great news. Your blood sugar, electrolytes and kidney function tests were normal.  Your hemoglobin A1c remains 5.9% which is what it was last time so that is great.    Jessica Parham PA-C

## 2020-03-04 ENCOUNTER — MYC MEDICAL ADVICE (OUTPATIENT)
Dept: FAMILY MEDICINE | Facility: CLINIC | Age: 55
End: 2020-03-04

## 2020-03-04 NOTE — TELEPHONE ENCOUNTER
PCP,    Please see [facility] messages below and confirm if its ok that pt's son schedules with you as his PCP?    Thank you,  Magdy HENDRICKS RN

## 2020-03-06 ENCOUNTER — MYC REFILL (OUTPATIENT)
Dept: FAMILY MEDICINE | Facility: CLINIC | Age: 55
End: 2020-03-06

## 2020-03-06 DIAGNOSIS — F33.1 MODERATE EPISODE OF RECURRENT MAJOR DEPRESSIVE DISORDER (H): ICD-10-CM

## 2020-03-06 DIAGNOSIS — K21.9 GASTROESOPHAGEAL REFLUX DISEASE, ESOPHAGITIS PRESENCE NOT SPECIFIED: ICD-10-CM

## 2020-03-06 NOTE — TELEPHONE ENCOUNTER
"Last Written Prescription Date:  2/26/20  Last Fill Quantity: 90,  # refills: 1   Last office visit: 2/26/2020 with prescribing provider:     Future Office Visit:    Requested Prescriptions   Pending Prescriptions Disp Refills     pantoprazole (PROTONIX) 40 MG EC tablet 90 tablet 1     Sig: Take 1 tablet (40 mg) by mouth daily       PPI Protocol Passed - 3/6/2020 11:23 AM        Passed - Not on Clopidogrel (unless Pantoprazole ordered)        Passed - No diagnosis of osteoporosis on record        Passed - Recent (12 mo) or future (30 days) visit within the authorizing provider's specialty     Patient has had an office visit with the authorizing provider or a provider within the authorizing providers department within the previous 12 mos or has a future within next 30 days. See \"Patient Info\" tab in inbasket, or \"Choose Columns\" in Meds & Orders section of the refill encounter.              Passed - Medication is active on med list        Passed - Patient is age 18 or older        Passed - No active pregnacy on record        Passed - No positive pregnancy test in past 12 months          "

## 2020-03-06 NOTE — TELEPHONE ENCOUNTER
"Last Written Prescription Date:  2/26/20  Last Fill Quantity: 135,  # refills: 1   Last office visit: 2/26/2020 with prescribing provider:     Future Office Visit:    Requested Prescriptions   Pending Prescriptions Disp Refills     sertraline (ZOLOFT) 50 MG tablet 135 tablet 1     Sig: Take 1.5 tablets (75 mg) by mouth daily       SSRIs Protocol Passed - 3/6/2020 11:23 AM        Passed - PHQ-9 score less than 5 in past 6 months     Please review last PHQ-9 score.           Passed - Medication is active on med list        Passed - Patient is age 18 or older        Passed - No active pregnancy on record        Passed - No positive pregnancy test in last 12 months        Passed - Recent (6 mo) or future (30 days) visit within the authorizing provider's specialty     Patient had office visit in the last 6 months or has a visit in the next 30 days with authorizing provider or within the authorizing provider's specialty.  See \"Patient Info\" tab in inbasket, or \"Choose Columns\" in Meds & Orders section of the refill encounter.              "

## 2020-03-09 RX ORDER — PANTOPRAZOLE SODIUM 40 MG/1
40 TABLET, DELAYED RELEASE ORAL DAILY
Qty: 90 TABLET | Refills: 1 | OUTPATIENT
Start: 2020-03-09

## 2020-03-22 ENCOUNTER — HEALTH MAINTENANCE LETTER (OUTPATIENT)
Age: 55
End: 2020-03-22

## 2020-06-02 ENCOUNTER — VIRTUAL VISIT (OUTPATIENT)
Dept: FAMILY MEDICINE | Facility: CLINIC | Age: 55
End: 2020-06-02
Payer: COMMERCIAL

## 2020-06-02 DIAGNOSIS — E04.1 THYROID NODULE: Primary | ICD-10-CM

## 2020-06-02 DIAGNOSIS — Z20.822 EXPOSURE TO COVID-19 VIRUS: ICD-10-CM

## 2020-06-02 PROCEDURE — 99213 OFFICE O/P EST LOW 20 MIN: CPT | Mod: TEL | Performed by: PHYSICIAN ASSISTANT

## 2020-06-02 NOTE — PROGRESS NOTES
"Juliann Mitchell is a 54 year old female who is being evaluated via a billable telephone visit.      The patient has been notified of following:     \"This telephone visit will be conducted via a call between you and your physician/provider. We have found that certain health care needs can be provided without the need for a physical exam.  This service lets us provide the care you need with a short phone conversation.  If a prescription is necessary we can send it directly to your pharmacy.  If lab work is needed we can place an order for that and you can then stop by our lab to have the test done at a later time.    Telephone visits are billed at different rates depending on your insurance coverage. During this emergency period, for some insurers they may be billed the same as an in-person visit.  Please reach out to your insurance provider with any questions.    If during the course of the call the physician/provider feels a telephone visit is not appropriate, you will not be charged for this service.\"    Patient has given verbal consent for Telephone visit?  Yes    What phone number would you like to be contacted at? [phone]    How would you like to obtain your AVS? MyChart    Subjective     Juliann Mitchell is a 54 year old female who presents via phone visit today for the following health issues:    HPI    Follow up on CT scan done at [facility].   CT Cervical Spine showed lateral thyroid nodule that was recommended to be followed wup with a thyroid ultrasound on a nonemergent basis    Laceration is U shaped from L eyebrow up to hairline and down the center of the forehead  She is able to raise both eyebrows normally    Pt fell 3/27/20 and had a scalp laceration  Had a few drinks and then fell down 12 stairs and hit her head  Had 17 sutures placed  CT neg for brain bleed  Incidental thyroid nodule noted and ultrasound needed  She still has numbness in her head around the area of the " laceration  There is no assoc pain  She is using a silicone scar gel    She went to [place] in March and after than had a cough but wants to be tested for COVID  Her brother did test positive.  She works in school      Reviewed and updated as needed this visit by Provider         Review of Systems   Constitutional, HEENT, cardiovascular, pulmonary, gi and gu systems are negative, except as otherwise noted.       Objective   Reported vitals:  LMP 06/20/2015    healthy, alert and no distress  PSYCH: Alert and oriented times 3; coherent speech, normal   rate and volume, able to articulate logical thoughts, able   to abstract reason, no tangential thoughts, no hallucinations   or delusions  Her affect is normal  RESP: No cough, no audible wheezing, able to talk in full sentences  Remainder of exam unable to be completed due to telephone visits    TSH   Date Value Ref Range Status   07/18/2019 1.81 0.40 - 4.00 mU/L Final       Assessment and Plan:     (E04.1) Thyroid nodule  (primary encounter diagnosis)  Comment:    Plan:  Thyroid            (Z20.828) Exposure to Covid-19 Virus  Comment:   Plan: COVID-19 Virus (Coronavirus) Antibody              Jessica Parham PA-C        Phone call duration: 27 minutes

## 2020-06-05 ENCOUNTER — HOSPITAL ENCOUNTER (OUTPATIENT)
Dept: ULTRASOUND IMAGING | Facility: CLINIC | Age: 55
Discharge: HOME | End: 2020-06-05
Attending: PHYSICIAN ASSISTANT | Admitting: PHYSICIAN ASSISTANT
Payer: COMMERCIAL

## 2020-06-05 DIAGNOSIS — E04.1 THYROID NODULE: ICD-10-CM

## 2020-06-05 DIAGNOSIS — Z20.822 EXPOSURE TO COVID-19 VIRUS: ICD-10-CM

## 2020-06-05 DIAGNOSIS — E04.1 THYROID NODULE: Primary | ICD-10-CM

## 2020-06-05 PROCEDURE — 36415 COLL VENOUS BLD VENIPUNCTURE: CPT | Performed by: PHYSICIAN ASSISTANT

## 2020-06-05 PROCEDURE — 99000 SPECIMEN HANDLING OFFICE-LAB: CPT | Performed by: PHYSICIAN ASSISTANT

## 2020-06-05 PROCEDURE — 76536 US EXAM OF HEAD AND NECK: CPT

## 2020-06-05 PROCEDURE — 86769 SARS-COV-2 COVID-19 ANTIBODY: CPT | Mod: 90 | Performed by: PHYSICIAN ASSISTANT

## 2020-06-05 NOTE — RESULT ENCOUNTER NOTE
Satnam,    The ultrasound shows that you have 3 nodules in the thyroid gland, one that meets criteria for ultrasound guided fine needle aspiration.  I will order that test to be done so you should be getting a call about that.  Once we have the results I will likely have you see an endocrinologist as they specialize in the thyroid.    Jessica Parham PA-C

## 2020-06-08 ENCOUNTER — TELEPHONE (OUTPATIENT)
Dept: INTERVENTIONAL RADIOLOGY/VASCULAR | Facility: CLINIC | Age: 55
End: 2020-06-08

## 2020-06-08 NOTE — TELEPHONE ENCOUNTER
Patient biopsy request for left thyroid nodule biopsy under ultrasound by Monae Guillen RN.  No sedation needed.  Virtual visit 6/2/20, no H&P needed for above procedure.  Biopsy 2.6 cm left thyroid nodule.  No anticoagulants or aspirin in Epic.  Monae Guillen RN, BSN

## 2020-06-09 DIAGNOSIS — Z11.59 ENCOUNTER FOR SCREENING FOR OTHER VIRAL DISEASES: Primary | ICD-10-CM

## 2020-06-09 LAB
COVID-19 SPIKE RBD ABY TITER: NORMAL
COVID-19 SPIKE RBD ABY: NEGATIVE

## 2020-06-11 ENCOUNTER — OFFICE VISIT (OUTPATIENT)
Dept: FAMILY MEDICINE | Facility: CLINIC | Age: 55
End: 2020-06-11
Payer: COMMERCIAL

## 2020-06-11 VITALS
HEIGHT: 67 IN | OXYGEN SATURATION: 97 % | DIASTOLIC BLOOD PRESSURE: 75 MMHG | WEIGHT: 278 LBS | TEMPERATURE: 97.1 F | HEART RATE: 85 BPM | BODY MASS INDEX: 43.63 KG/M2 | SYSTOLIC BLOOD PRESSURE: 118 MMHG

## 2020-06-11 DIAGNOSIS — E78.5 HYPERLIPIDEMIA LDL GOAL <130: ICD-10-CM

## 2020-06-11 DIAGNOSIS — Z00.00 ROUTINE GENERAL MEDICAL EXAMINATION AT A HEALTH CARE FACILITY: Primary | ICD-10-CM

## 2020-06-11 DIAGNOSIS — E04.1 THYROID NODULE: ICD-10-CM

## 2020-06-11 DIAGNOSIS — H81.10 BENIGN PAROXYSMAL POSITIONAL VERTIGO, UNSPECIFIED LATERALITY: ICD-10-CM

## 2020-06-11 DIAGNOSIS — R73.9 HYPERGLYCEMIA: ICD-10-CM

## 2020-06-11 DIAGNOSIS — D49.7 PITUITARY TUMOR: ICD-10-CM

## 2020-06-11 DIAGNOSIS — F33.1 MODERATE EPISODE OF RECURRENT MAJOR DEPRESSIVE DISORDER (H): ICD-10-CM

## 2020-06-11 DIAGNOSIS — K21.9 GASTROESOPHAGEAL REFLUX DISEASE, ESOPHAGITIS PRESENCE NOT SPECIFIED: ICD-10-CM

## 2020-06-11 LAB
ERYTHROCYTE [DISTWIDTH] IN BLOOD BY AUTOMATED COUNT: 13 % (ref 10–15)
HBA1C MFR BLD: 6.1 % (ref 0–5.6)
HCT VFR BLD AUTO: 39.9 % (ref 35–47)
HGB BLD-MCNC: 13.6 G/DL (ref 11.7–15.7)
MCH RBC QN AUTO: 30.4 PG (ref 26.5–33)
MCHC RBC AUTO-ENTMCNC: 34.1 G/DL (ref 31.5–36.5)
MCV RBC AUTO: 89 FL (ref 78–100)
PLATELET # BLD AUTO: 209 10E9/L (ref 150–450)
PROLACTIN SERPL-MCNC: 10 UG/L (ref 3–27)
RBC # BLD AUTO: 4.47 10E12/L (ref 3.8–5.2)
WBC # BLD AUTO: 5.1 10E9/L (ref 4–11)

## 2020-06-11 PROCEDURE — 99396 PREV VISIT EST AGE 40-64: CPT | Performed by: PHYSICIAN ASSISTANT

## 2020-06-11 PROCEDURE — 80053 COMPREHEN METABOLIC PANEL: CPT | Performed by: PHYSICIAN ASSISTANT

## 2020-06-11 PROCEDURE — 85027 COMPLETE CBC AUTOMATED: CPT | Performed by: PHYSICIAN ASSISTANT

## 2020-06-11 PROCEDURE — 36415 COLL VENOUS BLD VENIPUNCTURE: CPT | Performed by: PHYSICIAN ASSISTANT

## 2020-06-11 PROCEDURE — 84443 ASSAY THYROID STIM HORMONE: CPT | Performed by: PHYSICIAN ASSISTANT

## 2020-06-11 PROCEDURE — 84146 ASSAY OF PROLACTIN: CPT | Performed by: PHYSICIAN ASSISTANT

## 2020-06-11 PROCEDURE — 80061 LIPID PANEL: CPT | Performed by: PHYSICIAN ASSISTANT

## 2020-06-11 PROCEDURE — 83036 HEMOGLOBIN GLYCOSYLATED A1C: CPT | Performed by: PHYSICIAN ASSISTANT

## 2020-06-11 RX ORDER — ATORVASTATIN CALCIUM 10 MG/1
10 TABLET, FILM COATED ORAL DAILY
Qty: 90 TABLET | Refills: 3 | Status: SHIPPED | OUTPATIENT
Start: 2020-06-11 | End: 2020-12-09

## 2020-06-11 RX ORDER — PANTOPRAZOLE SODIUM 40 MG/1
40 TABLET, DELAYED RELEASE ORAL DAILY
Qty: 90 TABLET | Refills: 3 | Status: SHIPPED | OUTPATIENT
Start: 2020-06-11 | End: 2021-05-19

## 2020-06-11 ASSESSMENT — MIFFLIN-ST. JEOR: SCORE: 1888.75

## 2020-06-11 NOTE — PROGRESS NOTES
"   SUBJECTIVE:   CC: Juliann Mitchell is an 54 year old woman who presents for preventive health visit.     Thyroid nodule with FNA scheduled 6/16/20    TSH   Date Value Ref Range Status   07/18/2019 1.81 0.40 - 4.00 mU/L Final     S/p gastric sleeve in 2017, wt is up. Hasn't followed up with the surgeon and \"doesn't want to talk about it\"    Fell in March and had a large lac on forehead, ED records reviewed    She does see Dr. Burns annually for gyn exam.    She she was getting her hair done last week developed vertigo and they called 911  This resolved after a short time and she was not sent to ED.      Healthy Habits:    Getting at least 3 servings of Calcium per day:  Yes    Bi-annual eye exam:  Yes    Dental care twice a year:  Yes    Sleep apnea or symptoms of sleep apnea:  None    Diet:  Regular (no restrictions) (watches carb )    Frequency of exercise:  4-5 days/week    Duration of exercise:  45-60 minutes    Barriers to taking medications:  None    Medication side effects:  None    PHQ-2 Total Score:    Additional concerns today:  Yes        Today's PHQ-2 Score:   PHQ-2 ( 1999 Pfizer) 6/2/2020   Q1: Little interest or pleasure in doing things 0   Q2: Feeling down, depressed or hopeless 0   PHQ-2 Score 0   Q1: Little interest or pleasure in doing things -   Q2: Feeling down, depressed or hopeless -   PHQ-2 Score -       Abuse: Current or Past(Physical, Sexual or Emotional)- No  Do you feel safe in your environment? Yes    Have you ever done Advance Care Planning? (For example, a Health Directive, POLST, or a discussion with a medical provider or your loved ones about your wishes): Yes, patient states has an Advance Care Planning document and will bring a copy to the clinic.    Social History     Tobacco Use     Smoking status: Never Smoker     Smokeless tobacco: Never Used   Substance Use Topics     Alcohol use: Yes     Alcohol/week: 0.0 standard drinks     Comment: 1 drink per week     If you drink " alcohol do you typically have >3 drinks per day or >7 drinks per week? No    Alcohol Use 6/11/2020   Prescreen: >3 drinks/day or >7 drinks/week? No       Reviewed orders with patient.  Reviewed health maintenance and updated orders accordingly - Yes      Pertinent mammograms are reviewed under the imaging tab.       Reviewed and updated as needed this visit by clinical staff  Tobacco  Allergies  Meds         Reviewed and updated as needed this visit by Provider        Past Medical History:   Diagnosis Date     Cholelithiasis      Colon polyps      Depression, major, recurrent (H)      FH: colon cancer     father     Obesity     s/p lap band and then gastric sleeve in 2017     Pituitary adenoma (H)     Resected age 16      Past Surgical History:   Procedure Laterality Date     Benign pituitary gland tumor removed      age 16     c sections       LAPAROSCOPIC CHOLECYSTECTOMY N/A 3/9/2018    Procedure: LAPAROSCOPIC CHOLECYSTECTOMY;  LAPAROSCOPIC CHOLECYSTECTOMY ;  Surgeon: Sandoval Acevedo MD;  Location:  OR     LAPAROSCOPIC GASTRIC ADJUSTABLE BANDING      [facility]     LAPAROSCOPIC GASTRIC SLEEVE  06/2017    Dr. Noyola at [facility]     Current Outpatient Medications   Medication Sig Dispense Refill     atorvastatin (LIPITOR) 10 MG tablet Take 1 tablet (10 mg) by mouth daily 90 tablet 3     pantoprazole (PROTONIX) 40 MG EC tablet Take 1 tablet (40 mg) by mouth daily 90 tablet 3     Pediatric Multivit-Minerals-C (FLINTSTONES COMPLETE PO)        sertraline (ZOLOFT) 50 MG tablet Take 1.5 tablets (75 mg) by mouth daily 135 tablet 1     VITAMIN D, CHOLECALCIFEROL, PO Take 2,000 Units by mouth daily         Review of Systems  CONSTITUTIONAL: NEGATIVE for fever, chills, change in weight  INTEGUMENTARY/SKIN: NEGATIVE for worrisome rashes, moles or lesions  EYES: NEGATIVE for vision changes or irritation  ENT: NEGATIVE for ear, mouth and throat problems  RESP: NEGATIVE for significant cough or SOB  BREAST:  "NEGATIVE for masses, tenderness or discharge  CV: NEGATIVE for chest pain, palpitations or peripheral edema  GI: NEGATIVE for nausea, abdominal pain, heartburn, or change in bowel habits  : NEGATIVE for unusual urinary or vaginal symptoms. No vaginal bleeding.  MUSCULOSKELETAL: NEGATIVE for significant arthralgias or myalgia  NEURO: NEGATIVE for weakness, dizziness or paresthesias  PSYCHIATRIC: NEGATIVE for changes in mood or affect      OBJECTIVE:   /75 (Patient Position: Sitting)   Pulse 85   Temp 97.1  F (36.2  C) (Temporal)   Ht 1.702 m (5' 7.01\")   Wt 126.1 kg (278 lb)   LMP 06/20/2015   SpO2 97%   BMI 43.53 kg/m    Physical Exam  GENERAL APPEARANCE: obese, alert and no distress  EYES: Eyes grossly normal to inspection, PERRL and conjunctivae and sclerae normal  HENT: ear canals and TM's normal, nose and mouth without ulcers or lesions, oropharynx clear and oral mucous membranes moist  NECK: no adenopathy, no asymmetry, masses, or scars and thyroid enlarged on palpation  RESP: lungs clear to auscultation - no rales, rhonchi or wheezes  BREAST: per gyn  CV: regular rate and rhythm, normal S1 S2, no S3 or S4, no murmur, click or rub, no peripheral edema and peripheral pulses strong  ABDOMEN: soft, nontender, no hepatosplenomegaly, no masses and bowel sounds normal  MS: no musculoskeletal defects are noted and gait is age appropriate without ataxia  SKIN: no suspicious lesions or rashes  NEURO: Normal strength and tone, sensory exam grossly normal, mentation intact and speech normal  PSYCH: mentation appears normal and affect normal/bright    Results for orders placed or performed in visit on 06/11/20   Comprehensive metabolic panel     Status: Abnormal   Result Value Ref Range    Sodium 138 133 - 144 mmol/L    Potassium 4.4 3.4 - 5.3 mmol/L    Chloride 106 94 - 109 mmol/L    Carbon Dioxide 29 20 - 32 mmol/L    Anion Gap 3 3 - 14 mmol/L    Glucose 153 (H) 70 - 99 mg/dL    Urea Nitrogen 13 7 - 30 " mg/dL    Creatinine 0.61 0.52 - 1.04 mg/dL    GFR Estimate >90 >60 mL/min/[1.73_m2]    GFR Estimate If Black >90 >60 mL/min/[1.73_m2]    Calcium 8.6 8.5 - 10.1 mg/dL    Bilirubin Total 0.3 0.2 - 1.3 mg/dL    Albumin 3.6 3.4 - 5.0 g/dL    Protein Total 7.3 6.8 - 8.8 g/dL    Alkaline Phosphatase 85 40 - 150 U/L    ALT 36 0 - 50 U/L    AST 20 0 - 45 U/L   CBC with platelets     Status: None   Result Value Ref Range    WBC 5.1 4.0 - 11.0 10e9/L    RBC Count 4.47 3.8 - 5.2 10e12/L    Hemoglobin 13.6 11.7 - 15.7 g/dL    Hematocrit 39.9 35.0 - 47.0 %    MCV 89 78 - 100 fl    MCH 30.4 26.5 - 33.0 pg    MCHC 34.1 31.5 - 36.5 g/dL    RDW 13.0 10.0 - 15.0 %    Platelet Count 209 150 - 450 10e9/L   Lipid Profile     Status: Abnormal   Result Value Ref Range    Cholesterol 204 (H) <200 mg/dL    Triglycerides 245 (H) <150 mg/dL    HDL Cholesterol 54 >49 mg/dL    LDL Cholesterol Calculated 101 (H) <100 mg/dL    Non HDL Cholesterol 150 (H) <130 mg/dL   TSH with free T4 reflex     Status: None   Result Value Ref Range    TSH 2.52 0.40 - 4.00 mU/L   Hemoglobin A1c     Status: Abnormal   Result Value Ref Range    Hemoglobin A1C 6.1 (H) 0 - 5.6 %   Prolactin     Status: None   Result Value Ref Range    Prolactin 10 3 - 27 ug/L         ASSESSMENT/PLAN:   Assessment and Plan:     (Z00.00) Routine general medical examination at a health care facility  (primary encounter diagnosis)  Comment: recd she schedule mammogram for next month. Colonoscopy up to date.  Plan: Comprehensive metabolic panel, CBC with         platelets            (E04.1) Thyroid nodule  Comment:   Plan: ENDOCRINOLOGY ADULT REFERRAL, TSH with free T4         reflex            (E78.5) Hyperlipidemia LDL goal <130  Comment:   Plan: atorvastatin (LIPITOR) 10 MG tablet, Lipid         Profile            (K21.9) Gastroesophageal reflux disease, esophagitis presence not specified  Comment:   Plan: pantoprazole (PROTONIX) 40 MG EC tablet            (F33.1) Moderate episode of  "recurrent major depressive disorder (H)  Comment:   Plan: sertraline (ZOLOFT) 50 MG tablet            (R73.9) Hyperglycemia  Comment:   Plan: Hemoglobin A1c            (D49.7) Pituitary tumor  Comment:   Plan: Prolactin            (H81.10) Benign paroxysmal positional vertigo, unspecified laterality  Comment:   Plan: occurred when  put her head back in the sink and hasn't recurred.        COUNSELING:  Reviewed preventive health counseling, as reflected in patient instructions    Estimated body mass index is 43.53 kg/m  as calculated from the following:    Height as of this encounter: 1.702 m (5' 7.01\").    Weight as of this encounter: 126.1 kg (278 lb).         reports that she has never smoked. She has never used smokeless tobacco.      Counseling Resources:  ATP IV Guidelines  Pooled Cohorts Equation Calculator  Breast Cancer Risk Calculator  FRAX Risk Assessment  ICSI Preventive Guidelines  Dietary Guidelines for Americans, 2010  USDA's MyPlate  ASA Prophylaxis  Lung CA Screening    Jessica Parham PA-C  [facility]  "

## 2020-06-12 LAB
ALBUMIN SERPL-MCNC: 3.6 G/DL (ref 3.4–5)
ALP SERPL-CCNC: 85 U/L (ref 40–150)
ALT SERPL W P-5'-P-CCNC: 36 U/L (ref 0–50)
ANION GAP SERPL CALCULATED.3IONS-SCNC: 3 MMOL/L (ref 3–14)
AST SERPL W P-5'-P-CCNC: 20 U/L (ref 0–45)
BILIRUB SERPL-MCNC: 0.3 MG/DL (ref 0.2–1.3)
BUN SERPL-MCNC: 13 MG/DL (ref 7–30)
CALCIUM SERPL-MCNC: 8.6 MG/DL (ref 8.5–10.1)
CHLORIDE SERPL-SCNC: 106 MMOL/L (ref 94–109)
CHOLEST SERPL-MCNC: 204 MG/DL
CO2 SERPL-SCNC: 29 MMOL/L (ref 20–32)
CREAT SERPL-MCNC: 0.61 MG/DL (ref 0.52–1.04)
GFR SERPL CREATININE-BSD FRML MDRD: >90 ML/MIN/{1.73_M2}
GLUCOSE SERPL-MCNC: 153 MG/DL (ref 70–99)
HDLC SERPL-MCNC: 54 MG/DL
LDLC SERPL CALC-MCNC: 101 MG/DL
NONHDLC SERPL-MCNC: 150 MG/DL
POTASSIUM SERPL-SCNC: 4.4 MMOL/L (ref 3.4–5.3)
PROT SERPL-MCNC: 7.3 G/DL (ref 6.8–8.8)
SODIUM SERPL-SCNC: 138 MMOL/L (ref 133–144)
TRIGL SERPL-MCNC: 245 MG/DL
TSH SERPL DL<=0.005 MIU/L-ACNC: 2.52 MU/L (ref 0.4–4)

## 2020-06-13 DIAGNOSIS — Z11.59 ENCOUNTER FOR SCREENING FOR OTHER VIRAL DISEASES: ICD-10-CM

## 2020-06-13 PROCEDURE — 99000 SPECIMEN HANDLING OFFICE-LAB: CPT | Performed by: PHYSICIAN ASSISTANT

## 2020-06-13 PROCEDURE — U0003 INFECTIOUS AGENT DETECTION BY NUCLEIC ACID (DNA OR RNA); SEVERE ACUTE RESPIRATORY SYNDROME CORONAVIRUS 2 (SARS-COV-2) (CORONAVIRUS DISEASE [COVID-19]), AMPLIFIED PROBE TECHNIQUE, MAKING USE OF HIGH THROUGHPUT TECHNOLOGIES AS DESCRIBED BY CMS-2020-01-R: HCPCS | Mod: 90 | Performed by: PHYSICIAN ASSISTANT

## 2020-06-14 LAB
SARS-COV-2 RNA SPEC QL NAA+PROBE: NOT DETECTED
SPECIMEN SOURCE: NORMAL

## 2020-06-15 ENCOUNTER — TELEPHONE (OUTPATIENT)
Dept: MEDSURG UNIT | Facility: CLINIC | Age: 55
End: 2020-06-15

## 2020-06-15 NOTE — RESULT ENCOUNTER NOTE
It was a pleasure seeing you for your physical examination.  I wanted to get back to you with your test results.  I have enclosed a copy for your review.      I am happy to report that your CBC or complete blood count is normal with no signs of anemia, leukemia or platelet abnormalities.     Your chemistry panel shows a high blood sugar of 153. This is in the diabetic range. Your hemoglobin A1c was 6.1%.  Reduction in weight and a healthy low sugar diet is imperative in preventing this from going up.  Let me know if you'd like to talk to one of our diabetic educators for help with your diet.  Your blood salts, kidney tests, liver tests, and proteins are all fine.    Your thyroid test (TSH) was in normal range.    Your prolactin level was normal.    Your total cholesterol is 204 with the normal range being below 200.  Your HDL or good cholesterol is 54 with the normal range being above 50.  Your LDL or bad cholesterol is 101 with the normal range being below 130.  Your triglycerides are high at 245.  Avoid sugar and decrease carbs as they contribute to this number.  This may also help with weight loss.       Your COVID test was negative.    Let me know if you have any questions.    Jessica Parham PA-C

## 2020-06-15 NOTE — TELEPHONE ENCOUNTER
Pre-Procedure Negative COVID Test     Patient Notification  Patient notified of the negative COVID test result    Patient Information  Patient informed of the no visitor policy  Patient instructed to continue to self-quarantine prior to procedure  Patient informed to contact the ordering provider if the following symptoms develop prior to procedure:   Fever  Cough  Shortness of Breath  Sore throat   Runny or stuffy nose  Muscle or body aches  Headaches  Fatigue  Vomiting or diarrhea   Rash    Angy Alfred RN

## 2020-06-16 ENCOUNTER — HOSPITAL ENCOUNTER (OUTPATIENT)
Dept: ULTRASOUND IMAGING | Facility: CLINIC | Age: 55
End: 2020-06-16
Attending: PHYSICIAN ASSISTANT
Payer: COMMERCIAL

## 2020-06-16 ENCOUNTER — HOSPITAL ENCOUNTER (OUTPATIENT)
Facility: CLINIC | Age: 55
Discharge: HOME | End: 2020-06-16
Admitting: PHYSICIAN ASSISTANT
Payer: COMMERCIAL

## 2020-06-16 VITALS
OXYGEN SATURATION: 100 % | HEIGHT: 67 IN | SYSTOLIC BLOOD PRESSURE: 136 MMHG | RESPIRATION RATE: 16 BRPM | DIASTOLIC BLOOD PRESSURE: 95 MMHG | BODY MASS INDEX: 43.16 KG/M2 | HEART RATE: 74 BPM | TEMPERATURE: 98.6 F | WEIGHT: 275 LBS

## 2020-06-16 DIAGNOSIS — E04.1 THYROID NODULE: ICD-10-CM

## 2020-06-16 PROCEDURE — 25000125 ZZHC RX 250: Performed by: RADIOLOGY

## 2020-06-16 PROCEDURE — 88173 CYTOPATH EVAL FNA REPORT: CPT | Performed by: PHYSICIAN ASSISTANT

## 2020-06-16 PROCEDURE — 40000863 ZZH STATISTIC RADIOLOGY XRAY, US, CT, MAR, NM

## 2020-06-16 PROCEDURE — 27211108 US BIOPSY THYROID FINE NEEDLE ASPIRATION

## 2020-06-16 PROCEDURE — 88173 CYTOPATH EVAL FNA REPORT: CPT | Mod: 26 | Performed by: PHYSICIAN ASSISTANT

## 2020-06-16 RX ORDER — LIDOCAINE HYDROCHLORIDE 10 MG/ML
5 INJECTION, SOLUTION EPIDURAL; INFILTRATION; INTRACAUDAL; PERINEURAL ONCE
Status: COMPLETED | OUTPATIENT
Start: 2020-06-16 | End: 2020-06-16

## 2020-06-16 RX ADMIN — LIDOCAINE HYDROCHLORIDE 3 ML: 10 INJECTION, SOLUTION EPIDURAL; INFILTRATION; INTRACAUDAL; PERINEURAL at 09:23

## 2020-06-16 ASSESSMENT — MIFFLIN-ST. JEOR: SCORE: 1875.02

## 2020-06-16 NOTE — PROGRESS NOTES
Care Suites Post Procedure Note    Patient Information  Name: Juliann Mitchell  Age: 54 year old    Post Procedure  Time patient returned to Care Suites: 0935  Concerns/abnormal assessment: none, neck site covered with bandaid, clean, dry, and intact  If abnormal assessment, provider notified: N/A  Plan/Other: discharge to home when ride gets here    Care Suites Discharge Nursing Note    Patient Information  Name: Juliann Mitchell  Age: 54 year old    Discharge Education:  Discharge instructions reviewed: Yes  Additional education/resources provided: N/A  Patient/patient representative verbalizes understanding: Yes  Patient discharging on new medications: No  Medication education completed: N/A    Discharge Plans:   Discharge location: home  Discharge ride contacted: Yes  Approximate discharge time: 09:45 AM    Discharge Criteria:  Discharge criteria met and vital signs stable: Yes    Patient Belongs:  Patient belongings returned to patient: Yes    THEODORA Hayes RN

## 2020-06-16 NOTE — PROGRESS NOTES
Care Suites Admission Nursing Note    Patient Information  Name: Juliann Mitchell  Age: 54 year old  Reason for admission: FNA  Care Suites arrival time: 0830    Patient Admission/Assessment   Pre-procedure assessment complete: Yes  If abnormal assessment/labs, provider notified: No  NPO: Yes  Medications held per instructions/orders: N/A  Consent: obtained  If applicable, pregnancy test status: declined  Patient oriented to room: Yes  Education/questions answered: Yes  Plan/other: proceed    Discharge Planning  Accompanied by: self  Discharge name/phone number: Christopher marcus - [phone]  Overnight post sedation caregiver: NA  Discharge location home    Roberto Pal RN     PATIENT WELLNESS SCREENING    Step 1: Answer all screening questions 1-3.    1. In the last month, have you been in contact with someone who was confirmed or suspected to have Coronavirus/COVID-19? No    2. Do you have the following symptoms?   Fever? No   Cough? No   Shortness of breath? No   Skin rash? No    Step 2: Refer to logic grid below for actions    NO SYMPTOM(S)    ACTIONS:  1. Standard rooming process  2. Provider to assess per normal protocol    POSITIVE SYMPTOM(S)  If positive for ANY of the following symptoms: fever, cough, shortness of breath, rash    ACTIONS  1. Mask patient  2. Room patient as soon as possible  3. Don appropriate PPE when entering room  4. Provider evaluation

## 2020-06-16 NOTE — DISCHARGE INSTRUCTIONS
Thyroid/Lymph Node Biopsy Discharge Instructions     After you go home:      You may resume your normal diet    Care of Puncture Site:      You may have mild bruising, soreness & swelling at the puncture site. This will go away in a few days    For swelling & bruising, you may use an ice pack on the site.     Activity:      You may go back to your normal activity    Avoid strenuous activity for 24 hours    Medicines:      You may resume all medications    For minor pain, you may take Acetaminophen (Tylenol) or Ibuprofen (Advil)            Call the provider who ordered this test if:      Increased redness or swelling at the site    Fluid or blood oozing from the site    Severe pain at the site    Chills or a fever greater than 101 F (38 C).    Any questions or concerns    Call  911 or go to the Emergency Room if:      Trouble breathing    Your neck swells    Bleeding that you cannot control    Severe difficulty swallowing    If you have questions call:      [facility] @ [phone]

## 2020-06-17 LAB — COPATH REPORT: NORMAL

## 2020-06-17 NOTE — RESULT ENCOUNTER NOTE
Satnam,    Great news, your thyroid nodule was benign.  The endocrinologist can decide when you need to repeat an ultrasound.    Have a good day.    Jessica Parham PA-C

## 2020-06-18 ENCOUNTER — TELEPHONE (OUTPATIENT)
Dept: FAMILY MEDICINE | Facility: CLINIC | Age: 55
End: 2020-06-18

## 2020-06-18 NOTE — TELEPHONE ENCOUNTER
Please abstract the following data from this visit with this patient into the appropriate field in Epic:    Tests that can be patient reported without a hard copy:    Eye exam with ophthalmology on this date: 08/20/2019 at [facility] Verenice Elizabeth CMA

## 2020-10-27 ENCOUNTER — MYC MEDICAL ADVICE (OUTPATIENT)
Dept: FAMILY MEDICINE | Facility: CLINIC | Age: 55
End: 2020-10-27

## 2020-11-19 ENCOUNTER — TRANSFERRED RECORDS (OUTPATIENT)
Dept: HEALTH INFORMATION MANAGEMENT | Facility: CLINIC | Age: 55
End: 2020-11-19

## 2020-12-09 ENCOUNTER — MYC REFILL (OUTPATIENT)
Dept: FAMILY MEDICINE | Facility: CLINIC | Age: 55
End: 2020-12-09

## 2020-12-09 DIAGNOSIS — E78.5 HYPERLIPIDEMIA LDL GOAL <130: ICD-10-CM

## 2020-12-11 RX ORDER — ATORVASTATIN CALCIUM 10 MG/1
10 TABLET, FILM COATED ORAL DAILY
Qty: 90 TABLET | Refills: 1 | Status: SHIPPED | OUTPATIENT
Start: 2020-12-11 | End: 2021-03-22

## 2020-12-11 NOTE — TELEPHONE ENCOUNTER
"Requested Prescriptions   Pending Prescriptions Disp Refills     atorvastatin (LIPITOR) 10 MG tablet 90 tablet 3     Sig: Take 1 tablet (10 mg) by mouth daily       Statins Protocol Passed - 12/9/2020  7:17 PM        Passed - LDL on file in past 12 months     Recent Labs   Lab Test 06/11/20  1438   *             Passed - No abnormal creatine kinase in past 12 months     No lab results found.             Passed - Recent (12 mo) or future (30 days) visit within the authorizing provider's specialty     Patient has had an office visit with the authorizing provider or a provider within the authorizing providers department within the previous 12 mos or has a future within next 30 days. See \"Patient Info\" tab in inbasket, or \"Choose Columns\" in Meds & Orders section of the refill encounter.              Passed - Medication is active on med list        Passed - Patient is age 18 or older        Passed - No active pregnancy on record        Passed - No positive pregnancy test in past 12 months             "

## 2021-01-15 ENCOUNTER — HEALTH MAINTENANCE LETTER (OUTPATIENT)
Age: 56
End: 2021-01-15

## 2021-03-20 DIAGNOSIS — E78.5 HYPERLIPIDEMIA LDL GOAL <130: ICD-10-CM

## 2021-03-22 RX ORDER — ATORVASTATIN CALCIUM 10 MG/1
TABLET, FILM COATED ORAL
Qty: 90 TABLET | Refills: 1 | Status: SHIPPED | OUTPATIENT
Start: 2021-03-22 | End: 2021-09-16

## 2021-03-25 DIAGNOSIS — F33.1 MODERATE EPISODE OF RECURRENT MAJOR DEPRESSIVE DISORDER (H): ICD-10-CM

## 2021-03-25 ASSESSMENT — PATIENT HEALTH QUESTIONNAIRE - PHQ9: SUM OF ALL RESPONSES TO PHQ QUESTIONS 1-9: 4

## 2021-03-25 NOTE — TELEPHONE ENCOUNTER
Patient returned call.    PHQ-9 score:    PHQ 3/25/2021   PHQ-9 Total Score 4   Q9: Thoughts of better off dead/self-harm past 2 weeks Not at all     Informed that RX approved and sent to pharmacy.     Patient scheduled clinic visit with PCP mid-March.     Val ROSENTHAL, RN      March 25, 2021  3:45 PM

## 2021-03-25 NOTE — TELEPHONE ENCOUNTER
PHQ9 and 6 month depression follow up needed.    Left voice message asking pt to call triage back. On call back, please update PHQ9 and help pt schedule follow up visit.     PHQ 3/1/2018 12/19/2018 2/8/2020   PHQ-9 Total Score 0 0 3   Q9: Thoughts of better off dead/self-harm past 2 weeks Not at all Not at all Not at all     Medication is being filled for 1 time refill only due to:    PHQ9 and 6 month depression follow up needed.

## 2021-04-21 ENCOUNTER — TRANSFERRED RECORDS (OUTPATIENT)
Dept: HEALTH INFORMATION MANAGEMENT | Facility: CLINIC | Age: 56
End: 2021-04-21

## 2021-04-21 LAB
CHOLEST SERPL-MCNC: 171 MG/DL
CREAT SERPL-MCNC: 0.69 MG/DL (ref 0.5–1.05)
GFR SERPL CREATININE-BSD FRML MDRD: 97 ML/MIN/1.73M2
GLUCOSE SERPL-MCNC: 100 MG/DL (ref 65–99)
HBA1C MFR BLD: 6.1 % (ref 4–6)
HDLC SERPL-MCNC: 56 MG/DL
LDLC SERPL CALC-MCNC: 94 MG/DL
NONHDLC SERPL-MCNC: 115 MG/DL
POTASSIUM SERPL-SCNC: 4.2 MMOL/L (ref 3.5–5.3)
TRIGL SERPL-MCNC: 112 MG/DL
TSH SERPL-ACNC: 1.54 UIU/ML (ref 0.3–5)

## 2021-05-19 ENCOUNTER — OFFICE VISIT (OUTPATIENT)
Dept: FAMILY MEDICINE | Facility: CLINIC | Age: 56
End: 2021-05-19
Payer: COMMERCIAL

## 2021-05-19 VITALS
HEIGHT: 67 IN | OXYGEN SATURATION: 98 % | TEMPERATURE: 97.7 F | SYSTOLIC BLOOD PRESSURE: 115 MMHG | BODY MASS INDEX: 45.04 KG/M2 | DIASTOLIC BLOOD PRESSURE: 77 MMHG | HEART RATE: 70 BPM | WEIGHT: 287 LBS

## 2021-05-19 DIAGNOSIS — E66.01 MORBID OBESITY (H): ICD-10-CM

## 2021-05-19 DIAGNOSIS — F33.1 MODERATE EPISODE OF RECURRENT MAJOR DEPRESSIVE DISORDER (H): Primary | ICD-10-CM

## 2021-05-19 DIAGNOSIS — E11.9 TYPE 2 DIABETES MELLITUS WITHOUT COMPLICATION, WITHOUT LONG-TERM CURRENT USE OF INSULIN (H): ICD-10-CM

## 2021-05-19 PROCEDURE — 99213 OFFICE O/P EST LOW 20 MIN: CPT | Performed by: PHYSICIAN ASSISTANT

## 2021-05-19 RX ORDER — SERTRALINE HYDROCHLORIDE 100 MG/1
100 TABLET, FILM COATED ORAL DAILY
Qty: 90 TABLET | Refills: 1 | Status: SHIPPED | OUTPATIENT
Start: 2021-05-19 | End: 2021-12-31

## 2021-05-19 ASSESSMENT — MIFFLIN-ST. JEOR: SCORE: 1924.45

## 2021-05-19 NOTE — PROGRESS NOTES
"HPI: Satnam is a 54 yo female here for f/u depression and hyperlipidemia    She has thyroid nodules and followed by Dr. Irby in [facility].  Pt had labs done there so will request results.  Her cholesterol was checked there  She is requesting a refill of lipitor  Tolerates her medication well  She had an A1c of 6.1%  She is walking 3 miles per day but has gained back some weight    Past Medical History:   Diagnosis Date     Cholelithiasis      Colon polyps      Depression, major, recurrent (H)      FH: colon cancer     father     Obesity     s/p lap band and then gastric sleeve in 2017     Pituitary adenoma (H)     Resected age 16     Past Surgical History:   Procedure Laterality Date     Benign pituitary gland tumor removed      age 16     c sections       LAPAROSCOPIC CHOLECYSTECTOMY N/A 3/9/2018    Procedure: LAPAROSCOPIC CHOLECYSTECTOMY;  LAPAROSCOPIC CHOLECYSTECTOMY ;  Surgeon: Sandoval Acevedo MD;  Location:  OR     LAPAROSCOPIC GASTRIC ADJUSTABLE BANDING      [facility]     LAPAROSCOPIC GASTRIC SLEEVE  06/2017    Dr. Noyola at [facility]     Social History     Tobacco Use     Smoking status: Never Smoker     Smokeless tobacco: Never Used   Substance Use Topics     Alcohol use: Yes     Alcohol/week: 0.0 standard drinks     Comment: 1 drink per week     Current Outpatient Medications   Medication Sig Dispense Refill     atorvastatin (LIPITOR) 10 MG tablet TAKE 1 TABLET BY MOUTH EVERY DAY 90 tablet 1     Pediatric Multivit-Minerals-C (FLINTSTONES COMPLETE PO)        sertraline (ZOLOFT) 100 MG tablet Take 1 tablet (100 mg) by mouth daily 90 tablet 1     VITAMIN D, CHOLECALCIFEROL, PO Take 2,000 Units by mouth daily       No Known Allergies  FAMILY HISTORY NOTED AND REVIEWED    PHYSICAL EXAM:    /77 (BP Location: Left arm, Patient Position: Chair, Cuff Size: Adult Large)   Pulse 70   Temp 97.7  F (36.5  C) (Temporal)   Ht 1.702 m (5' 7\")   Wt 130.2 kg (287 lb)   LMP 06/20/2015   SpO2 98%   BMI " 44.95 kg/m      Patient appears non toxic    Assessment and Plan:     (F33.1) Moderate episode of recurrent major depressive disorder (H)  (primary encounter diagnosis)  Comment: recd she increase zoloft to 100mg every day since she occas increases to this dose on her own on bad days.    Plan: sertraline (ZOLOFT) 100 MG tablet            (E11.9) Type 2 diabetes mellitus without complication, without long-term current use of insulin (H)  Comment: pt followed by annie and records are reviewed and will scan into epic  Plan: A1c     (E66.01) Morbid obesity (H)  Comment:   Plan: recd she consider Vy Parham PA-C

## 2021-06-11 ENCOUNTER — ANCILLARY PROCEDURE (OUTPATIENT)
Dept: ULTRASOUND IMAGING | Facility: CLINIC | Age: 56
End: 2021-06-11
Attending: INTERNAL MEDICINE
Payer: COMMERCIAL

## 2021-06-11 DIAGNOSIS — E04.1 THYROID NODULE: ICD-10-CM

## 2021-06-11 PROCEDURE — 76536 US EXAM OF HEAD AND NECK: CPT

## 2021-07-11 ENCOUNTER — HEALTH MAINTENANCE LETTER (OUTPATIENT)
Age: 56
End: 2021-07-11

## 2021-09-05 ENCOUNTER — HEALTH MAINTENANCE LETTER (OUTPATIENT)
Age: 56
End: 2021-09-05

## 2021-09-15 DIAGNOSIS — E78.5 HYPERLIPIDEMIA LDL GOAL <130: ICD-10-CM

## 2021-09-16 RX ORDER — ATORVASTATIN CALCIUM 10 MG/1
TABLET, FILM COATED ORAL
Qty: 90 TABLET | Refills: 1 | Status: SHIPPED | OUTPATIENT
Start: 2021-09-16 | End: 2022-03-10

## 2021-10-31 ENCOUNTER — HEALTH MAINTENANCE LETTER (OUTPATIENT)
Age: 56
End: 2021-10-31

## 2022-01-10 ENCOUNTER — MYC REFILL (OUTPATIENT)
Dept: FAMILY MEDICINE | Facility: CLINIC | Age: 57
End: 2022-01-10
Payer: COMMERCIAL

## 2022-01-10 DIAGNOSIS — F33.1 MODERATE EPISODE OF RECURRENT MAJOR DEPRESSIVE DISORDER (H): ICD-10-CM

## 2022-01-12 RX ORDER — SERTRALINE HYDROCHLORIDE 100 MG/1
100 TABLET, FILM COATED ORAL DAILY
Qty: 90 TABLET | Refills: 3 | Status: SHIPPED | OUTPATIENT
Start: 2022-01-12 | End: 2023-03-06

## 2022-01-12 NOTE — TELEPHONE ENCOUNTER
Appointments in Next Year    Feb 15, 2022  3:15 PM  Screening Mammogram with [facility]  [facility] ([facility]) [phone]   Mar 10, 2022  3:30 PM  (Arrive by 3:10 PM)  PHYSICAL with Jessica Parham PA-C  [facility] ([facility] ) [phone]        Routing refill request to provider for review/approval because:  Judy refill given, pended additional judy refill to last until visit (per protocol can only send judy refill x1)     Rocio GALLEGOS, Triage RN  [facility]

## 2022-01-27 ENCOUNTER — TRANSFERRED RECORDS (OUTPATIENT)
Dept: MULTI SPECIALTY CLINIC | Facility: CLINIC | Age: 57
End: 2022-01-27
Payer: COMMERCIAL

## 2022-01-27 LAB
HPV ABSTRACT: NORMAL
PAP SMEAR - HIM PATIENT REPORTED: NEGATIVE
PAP-ABSTRACT: NORMAL

## 2022-02-15 ENCOUNTER — ANCILLARY PROCEDURE (OUTPATIENT)
Dept: MAMMOGRAPHY | Facility: CLINIC | Age: 57
End: 2022-02-15
Attending: PHYSICIAN ASSISTANT
Payer: COMMERCIAL

## 2022-02-15 DIAGNOSIS — Z12.31 VISIT FOR SCREENING MAMMOGRAM: ICD-10-CM

## 2022-02-15 PROCEDURE — 77067 SCR MAMMO BI INCL CAD: CPT | Mod: GC

## 2022-03-09 NOTE — PROGRESS NOTES
SUBJECTIVE:   CC: Juliann Mitchell is an 56 year old woman who presents for preventive health visit.     Mammogram done last month and normal.  Colonoscopy done 2018; repeat next year.  Pap done by gyn  Flu shot due, shingrex due  Thyroid nodule followed by endo.    Pt had dark vaginal discharge  Did see gyn (Dr. Burns) for this and had pelvic ultrasound which was normal  She will monitor for this. Was triggered by intercourse so may have been related to irritation from atrophy. She added lubricant.    Plans to see derm and her eye doc  She sees Dr. Friedman    GERD worse despite taking omeprazole. Denies any red flag sxs such as choking, unint wt loss.    She will f/u with the bariatric surgeon    Starting a keto type diet and did get an e bike      Patient has been advised of split billing requirements and indicates understanding: Yes  Healthy Habits:     Getting at least 3 servings of Calcium per day:  Yes    Bi-annual eye exam:  NO    Dental care twice a year:  Yes    Sleep apnea or symptoms of sleep apnea:  None    Diet:  Carbohydrate counting    Frequency of exercise:  2-3 days/week    Duration of exercise:  30-45 minutes    Taking medications regularly:  Yes    Barriers to taking medications:  None    Medication side effects:  None    PHQ-2 Total Score: 0    Additional concerns today:  Yes        Today's PHQ-2 Score:   PHQ-2 ( 1999 [employer]) 3/10/2022   Q1: Little interest or pleasure in doing things 0   Q2: Feeling down, depressed or hopeless 0   PHQ-2 Score 0   PHQ-2 Total Score (12-17 Years)- Positive if 3 or more points; Administer PHQ-A if positive -   Q1: Little interest or pleasure in doing things Not at all   Q2: Feeling down, depressed or hopeless Not at all   PHQ-2 Score 0       Abuse: Current or Past (Physical, Sexual or Emotional) - No  Do you feel safe in your environment? Yes        Social History     Tobacco Use     Smoking status: Never Smoker     Smokeless tobacco: Never Used   Substance  Use Topics     Alcohol use: Yes     Alcohol/week: 0.0 standard drinks     Comment: 1 drink per week     If you drink alcohol do you typically have >3 drinks per day or >7 drinks per week? No    Alcohol Use 3/10/2022   Prescreen: >3 drinks/day or >7 drinks/week? No   Prescreen: >3 drinks/day or >7 drinks/week? -     Reviewed orders with patient.  Reviewed health maintenance and updated orders accordingly -     FHS-7:   Breast CA Risk Assessment (S-7) 2/15/2022 3/10/2022   Did any of your first-degree relatives have breast or ovarian cancer? No No   Did any of your relatives have bilateral breast cancer? No No   Did any man in your family have breast cancer? No No   Did any woman in your family have breast and ovarian cancer? No No   Did any woman in your family have breast cancer before age 50 y? No No   Do you have 2 or more relatives with breast and/or ovarian cancer? No No   Do you have 2 or more relatives with breast and/or bowel cancer? No -         Pertinent mammograms are reviewed under the imaging tab.    History of abnormal Pap smear: NO - age 30- 65 PAP every 3 years recommended     Reviewed and updated as needed this visit by clinical staff   Tobacco  Allergies  Meds              Reviewed and updated as needed this visit by Provider                 Past Medical History:   Diagnosis Date     Cholelithiasis      Colon polyps      Depression, major, recurrent (H)      Depressive disorder      Diabetes (H)      FH: colon cancer     father     Obesity     s/p lap band and then gastric sleeve in 2017     Pituitary adenoma (H)     Resected age 16     Past Surgical History:   Procedure Laterality Date     Benign pituitary gland tumor removed      age 16     c sections       COLONOSCOPY  8/18     LAPAROSCOPIC CHOLECYSTECTOMY N/A 3/9/2018    Procedure: LAPAROSCOPIC CHOLECYSTECTOMY;  LAPAROSCOPIC CHOLECYSTECTOMY ;  Surgeon: Sandoval Acevedo MD;  Location: [facility] OR     LAPAROSCOPIC GASTRIC ADJUSTABLE BANDING   " [facility]     LAPAROSCOPIC GASTRIC SLEEVE  06/2017    Dr. Noyola at [facility]     Current Outpatient Medications   Medication Sig Dispense Refill     atorvastatin (LIPITOR) 10 MG tablet TAKE 1 TABLET BY MOUTH EVERY DAY 90 tablet 1     omeprazole (PRILOSEC) 20 MG DR capsule Take 20 mg by mouth daily       Pediatric Multivit-Minerals-C (FLINTSTONES COMPLETE PO)        sertraline (ZOLOFT) 100 MG tablet Take 1 tablet (100 mg) by mouth daily 90 tablet 3     VITAMIN D, CHOLECALCIFEROL, PO Take 2,000 Units by mouth daily           Review of Systems  CONSTITUTIONAL: NEGATIVE for fever, chills, change in weight  INTEGUMENTARY/SKIN: NEGATIVE for worrisome rashes, moles or lesions  EYES: NEGATIVE for vision changes or irritation  ENT: NEGATIVE for ear, mouth and throat problems  RESP: NEGATIVE for significant cough or SOB  BREAST: NEGATIVE for masses, tenderness or discharge  CV: NEGATIVE for chest pain, palpitations or peripheral edema  GI: NEGATIVE for nausea, abdominal pain, heartburn, or change in bowel habits  : NEGATIVE for unusual urinary or vaginal symptoms. No vaginal bleeding.  MUSCULOSKELETAL: NEGATIVE for significant arthralgias or myalgia  NEURO: NEGATIVE for weakness, dizziness or paresthesias  PSYCHIATRIC: NEGATIVE for changes in mood or affect      OBJECTIVE:   /89 (BP Location: Left arm, Patient Position: Sitting, Cuff Size: Adult Large)   Pulse 86   Temp 97.1  F (36.2  C) (Temporal)   Resp 18   Ht 1.702 m (5' 7\")   Wt 130.2 kg (287 lb)   LMP 06/20/2015   SpO2 99%   BMI 44.95 kg/m    Physical Exam  GENERAL APPEARANCE: healthy, alert and no distress  EYES: Eyes grossly normal to inspection, PERRL and conjunctivae and sclerae normal  HENT: ear canals and TM's normal, nose and mouth without ulcers or lesions, oropharynx clear and oral mucous membranes moist  NECK: no adenopathy, no asymmetry, masses, or scars and thyroid normal to palpation  RESP: lungs clear to auscultation - no rales, " rhonchi or wheezes  CV: regular rate and rhythm, normal S1 S2, no S3 or S4, no murmur, click or rub, no peripheral edema and peripheral pulses strong  ABDOMEN: soft, nontender, no hepatosplenomegaly, no masses and bowel sounds normal  MS: no musculoskeletal defects are noted and gait is age appropriate without ataxia  SKIN: no suspicious lesions or rashes  NEURO: Normal strength and tone, sensory exam grossly normal, mentation intact and speech normal  PSYCH: mentation appears normal and affect normal/bright    Results for orders placed or performed in visit on 03/10/22   Albumin Random Urine Quantitative with Creat Ratio     Status: None   Result Value Ref Range    Creatinine Urine mg/dL 50 mg/dL    Albumin Urine mg/L <5 mg/L    Albumin Urine mg/g Cr     HEMOGLOBIN A1C     Status: Abnormal   Result Value Ref Range    Hemoglobin A1C 6.6 (H) 0.0 - 5.6 %   Comprehensive metabolic panel (BMP + Alb, Alk Phos, ALT, AST, Total. Bili, TP)     Status: Abnormal   Result Value Ref Range    Sodium 139 133 - 144 mmol/L    Potassium 3.8 3.4 - 5.3 mmol/L    Chloride 105 94 - 109 mmol/L    Carbon Dioxide (CO2) 23 20 - 32 mmol/L    Anion Gap 11 3 - 14 mmol/L    Urea Nitrogen 13 7 - 30 mg/dL    Creatinine 0.70 0.52 - 1.04 mg/dL    Calcium 9.3 8.5 - 10.1 mg/dL    Glucose 104 (H) 70 - 99 mg/dL    Alkaline Phosphatase 86 40 - 150 U/L    AST 21 0 - 45 U/L    ALT 48 0 - 50 U/L    Protein Total 7.5 6.8 - 8.8 g/dL    Albumin 3.8 3.4 - 5.0 g/dL    Bilirubin Total 0.5 0.2 - 1.3 mg/dL    GFR Estimate >90 >60 mL/min/1.73m2   CBC with platelets     Status: Normal   Result Value Ref Range    WBC Count 6.3 4.0 - 11.0 10e3/uL    RBC Count 4.71 3.80 - 5.20 10e6/uL    Hemoglobin 13.9 11.7 - 15.7 g/dL    Hematocrit 41.7 35.0 - 47.0 %    MCV 89 78 - 100 fL    MCH 29.5 26.5 - 33.0 pg    MCHC 33.3 31.5 - 36.5 g/dL    RDW 13.6 10.0 - 15.0 %    Platelet Count 216 150 - 450 10e3/uL   Lipid panel reflex to direct LDL Fasting     Status: Abnormal   Result  Value Ref Range    Cholesterol 204 (H) <200 mg/dL    Triglycerides 133 <150 mg/dL    Direct Measure HDL 61 >=50 mg/dL    LDL Cholesterol Calculated 116 (H) <=100 mg/dL    Non HDL Cholesterol 143 (H) <130 mg/dL    Patient Fasting > 8hrs? Yes     Narrative    Cholesterol  Desirable:  <200 mg/dL    Triglycerides  Normal:  Less than 150 mg/dL  Borderline High:  150-199 mg/dL  High:  200-499 mg/dL  Very High:  Greater than or equal to 500 mg/dL    Direct Measure HDL  Female:  Greater than or equal to 50 mg/dL   Male:  Greater than or equal to 40 mg/dL    LDL Cholesterol  Desirable:  <100mg/dL  Above Desirable:  100-129 mg/dL   Borderline High:  130-159 mg/dL   High:  160-189 mg/dL   Very High:  >= 190 mg/dL    Non HDL Cholesterol  Desirable:  130 mg/dL  Above Desirable:  130-159 mg/dL  Borderline High:  160-189 mg/dL  High:  190-219 mg/dL  Very High:  Greater than or equal to 220 mg/dL   TSH with free T4 reflex     Status: Normal   Result Value Ref Range    TSH 1.57 0.40 - 4.00 mU/L         ASSESSMENT/PLAN:   Assessment and Plan:     (Z00.00) Routine general medical examination at a health care facility  (primary encounter diagnosis)  Comment: Pt followed by gyn for breast/pap/pelvic.  Discussed checking insurance re: shingrex.  Mammogram up to date. Colonoscopy due next year.  Plan: Comprehensive metabolic panel (BMP + Alb, Alk         Phos, ALT, AST, Total. Bili, TP), CBC with         platelets            (E78.5) Hyperlipidemia LDL goal <130  Comment:   Plan: atorvastatin (LIPITOR) 10 MG tablet, Lipid         panel reflex to direct LDL Fasting            (E11.9) Type 2 diabetes mellitus without complication, without long-term current use of insulin (H)  Comment: A1c at goal. Plans to make eye appt. Diet controlled.  Plan: Albumin Random Urine Quantitative with Creat         Ratio, HEMOGLOBIN A1C            (F33.1) Moderate episode of recurrent major depressive disorder (H)  Comment:   Plan: stable on zoloft    (E66.01)  "Morbid obesity (H)  Comment:   Plan: she is trying keto and did get an E bike to become mor active.    (K21.9) Chronic GERD  Comment: switched omeprazole to protonix. Consider EGD if sxs persist.  Plan: pantoprazole (PROTONIX) 40 MG EC tablet            (E04.1) Thyroid nodule  Comment: followed by endo.  Reviewed last ultrasound  Plan: TSH with free T4 reflex                Patient has been advised of split billing requirements and indicates understanding: Yes    COUNSELING:  Reviewed preventive health counseling, as reflected in patient instructions    Estimated body mass index is 44.95 kg/m  as calculated from the following:    Height as of this encounter: 1.702 m (5' 7\").    Weight as of this encounter: 130.2 kg (287 lb).        She reports that she has never smoked. She has never used smokeless tobacco.      Counseling Resources:  ATP IV Guidelines  Pooled Cohorts Equation Calculator  Breast Cancer Risk Calculator  BRCA-Related Cancer Risk Assessment: FHS-7 Tool  FRAX Risk Assessment  ICSI Preventive Guidelines  Dietary Guidelines for Americans, 2010  USDA's MyPlate  ASA Prophylaxis  Lung CA Screening    Jessica Parham PA-C  [facility]  "

## 2022-03-10 ENCOUNTER — OFFICE VISIT (OUTPATIENT)
Dept: FAMILY MEDICINE | Facility: CLINIC | Age: 57
End: 2022-03-10
Payer: COMMERCIAL

## 2022-03-10 VITALS
SYSTOLIC BLOOD PRESSURE: 136 MMHG | WEIGHT: 287 LBS | HEART RATE: 86 BPM | HEIGHT: 67 IN | BODY MASS INDEX: 45.04 KG/M2 | OXYGEN SATURATION: 99 % | TEMPERATURE: 97.1 F | RESPIRATION RATE: 18 BRPM | DIASTOLIC BLOOD PRESSURE: 89 MMHG

## 2022-03-10 DIAGNOSIS — E66.01 MORBID OBESITY (H): ICD-10-CM

## 2022-03-10 DIAGNOSIS — Z00.00 ROUTINE GENERAL MEDICAL EXAMINATION AT A HEALTH CARE FACILITY: Primary | ICD-10-CM

## 2022-03-10 DIAGNOSIS — K21.9 CHRONIC GERD: ICD-10-CM

## 2022-03-10 DIAGNOSIS — E04.1 THYROID NODULE: ICD-10-CM

## 2022-03-10 DIAGNOSIS — E11.9 TYPE 2 DIABETES MELLITUS WITHOUT COMPLICATION, WITHOUT LONG-TERM CURRENT USE OF INSULIN (H): ICD-10-CM

## 2022-03-10 DIAGNOSIS — E78.5 HYPERLIPIDEMIA LDL GOAL <130: ICD-10-CM

## 2022-03-10 DIAGNOSIS — F33.1 MODERATE EPISODE OF RECURRENT MAJOR DEPRESSIVE DISORDER (H): ICD-10-CM

## 2022-03-10 LAB
ERYTHROCYTE [DISTWIDTH] IN BLOOD BY AUTOMATED COUNT: 13.6 % (ref 10–15)
HBA1C MFR BLD: 6.6 % (ref 0–5.6)
HCT VFR BLD AUTO: 41.7 % (ref 35–47)
HGB BLD-MCNC: 13.9 G/DL (ref 11.7–15.7)
MCH RBC QN AUTO: 29.5 PG (ref 26.5–33)
MCHC RBC AUTO-ENTMCNC: 33.3 G/DL (ref 31.5–36.5)
MCV RBC AUTO: 89 FL (ref 78–100)
PLATELET # BLD AUTO: 216 10E3/UL (ref 150–450)
RBC # BLD AUTO: 4.71 10E6/UL (ref 3.8–5.2)
WBC # BLD AUTO: 6.3 10E3/UL (ref 4–11)

## 2022-03-10 PROCEDURE — 82043 UR ALBUMIN QUANTITATIVE: CPT | Performed by: PHYSICIAN ASSISTANT

## 2022-03-10 PROCEDURE — 80053 COMPREHEN METABOLIC PANEL: CPT | Performed by: PHYSICIAN ASSISTANT

## 2022-03-10 PROCEDURE — 99396 PREV VISIT EST AGE 40-64: CPT | Performed by: PHYSICIAN ASSISTANT

## 2022-03-10 PROCEDURE — 36415 COLL VENOUS BLD VENIPUNCTURE: CPT | Performed by: PHYSICIAN ASSISTANT

## 2022-03-10 PROCEDURE — 83036 HEMOGLOBIN GLYCOSYLATED A1C: CPT | Performed by: PHYSICIAN ASSISTANT

## 2022-03-10 PROCEDURE — 85027 COMPLETE CBC AUTOMATED: CPT | Performed by: PHYSICIAN ASSISTANT

## 2022-03-10 PROCEDURE — 84443 ASSAY THYROID STIM HORMONE: CPT | Performed by: PHYSICIAN ASSISTANT

## 2022-03-10 PROCEDURE — 80061 LIPID PANEL: CPT | Performed by: PHYSICIAN ASSISTANT

## 2022-03-10 RX ORDER — PANTOPRAZOLE SODIUM 40 MG/1
40 TABLET, DELAYED RELEASE ORAL DAILY
Qty: 90 TABLET | Refills: 3 | Status: SHIPPED | OUTPATIENT
Start: 2022-03-10 | End: 2023-04-06

## 2022-03-10 RX ORDER — ATORVASTATIN CALCIUM 10 MG/1
10 TABLET, FILM COATED ORAL DAILY
Qty: 90 TABLET | Refills: 3 | Status: SHIPPED | OUTPATIENT
Start: 2022-03-10 | End: 2022-10-06 | Stop reason: DRUGHIGH

## 2022-03-10 ASSESSMENT — ENCOUNTER SYMPTOMS
HEARTBURN: 1
BREAST MASS: 0
MYALGIAS: 1

## 2022-03-10 ASSESSMENT — PAIN SCALES - GENERAL: PAINLEVEL: NO PAIN (0)

## 2022-03-10 NOTE — Clinical Note
Please abstract the following data from this visit with this patient into the appropriate field in Epic:    Tests that can be patient reported without a hard copy:    Pap smear done on this date: 01/27/2022 (approximately), by this group: Renetta Crowley , results were Normal.

## 2022-03-11 LAB
ALBUMIN SERPL-MCNC: 3.8 G/DL (ref 3.4–5)
ALP SERPL-CCNC: 86 U/L (ref 40–150)
ALT SERPL W P-5'-P-CCNC: 48 U/L (ref 0–50)
ANION GAP SERPL CALCULATED.3IONS-SCNC: 11 MMOL/L (ref 3–14)
AST SERPL W P-5'-P-CCNC: 21 U/L (ref 0–45)
BILIRUB SERPL-MCNC: 0.5 MG/DL (ref 0.2–1.3)
BUN SERPL-MCNC: 13 MG/DL (ref 7–30)
CALCIUM SERPL-MCNC: 9.3 MG/DL (ref 8.5–10.1)
CHLORIDE BLD-SCNC: 105 MMOL/L (ref 94–109)
CHOLEST SERPL-MCNC: 204 MG/DL
CO2 SERPL-SCNC: 23 MMOL/L (ref 20–32)
CREAT SERPL-MCNC: 0.7 MG/DL (ref 0.52–1.04)
CREAT UR-MCNC: 50 MG/DL
FASTING STATUS PATIENT QL REPORTED: YES
GFR SERPL CREATININE-BSD FRML MDRD: >90 ML/MIN/1.73M2
GLUCOSE BLD-MCNC: 104 MG/DL (ref 70–99)
HDLC SERPL-MCNC: 61 MG/DL
LDLC SERPL CALC-MCNC: 116 MG/DL
MICROALBUMIN UR-MCNC: <5 MG/L
MICROALBUMIN/CREAT UR: NORMAL MG/G{CREAT}
NONHDLC SERPL-MCNC: 143 MG/DL
POTASSIUM BLD-SCNC: 3.8 MMOL/L (ref 3.4–5.3)
PROT SERPL-MCNC: 7.5 G/DL (ref 6.8–8.8)
SODIUM SERPL-SCNC: 139 MMOL/L (ref 133–144)
TRIGL SERPL-MCNC: 133 MG/DL
TSH SERPL DL<=0.005 MIU/L-ACNC: 1.57 MU/L (ref 0.4–4)

## 2022-03-12 NOTE — RESULT ENCOUNTER NOTE
It was a pleasure seeing you for your physical examination.  I wanted to get back to you with your test results.     I am happy to report that your CBC or complete blood count is normal with no signs of anemia, leukemia or platelet abnormalities. Your chemistry panel shows a slight high blood sugar.  Your hemoglobin A1c is in the diabetic range however at 6.6%.  I think this will improve if you lose weight on the keto diet. See me in 3 months so we can recheck your weight and this number.  If it doesn't improved, we can discuss medication that would help.    Your blood salts, kidney tests, liver tests, and proteins are all fine.    Your urine microalbumin is normal.  Your thyroid (TSH) is in normal range.    Your total cholesterol is 204 with the normal range being below 200.  Your HDL or good cholesterol is 61 with the normal range being above 50.  Your LDL or bad cholesterol is 116 with the normal range being below 100.  This should come down as you lose weight.    Jessica Parham PA-C

## 2022-03-17 DIAGNOSIS — E11.9 TYPE 2 DIABETES MELLITUS WITHOUT COMPLICATION, WITHOUT LONG-TERM CURRENT USE OF INSULIN (H): Primary | ICD-10-CM

## 2022-04-02 NOTE — PROGRESS NOTES
Medication Therapy Management (MTM) Encounter    ASSESSMENT:                            Medication Adherence/Access: No issues identified    Type 2 Diabetes: Patient is not meeting A1c goal of < 6.5%. Patient may benefit from starting metformin, GLP-1 or SGLT-2. GLP-1 may be beneficial given the cardioprotective and weight loss benefits. If patient is tolerating GLP-1 well, could consider dose increase in future. Patient may also benefit from starting CGM given the benefits of accountability, potentially providing additional blood sugar lowering.    GERD: Stable.    Hyperlipidemia: Stable. Patient is on moderate intensity statin which is indicated based on 2019 ACC/AHA guidelines for lipid management.      Depression:  Stable.    Supplements: Stable.    Immunizations: Patient is due for the Shingrix, Hepatitis B, and pneumococcal vaccinations.    PLAN:                          Jessica Parham is currently out of the office and Dr. José Antonio Remy is covering for her. Verbally approved the following to be sent under Dr. Remy's name:  1. We will have you start Ozempic. You will inject Ozempic 0.25 mg once weekly for 4 weeks, and then increase to 0.5 mg after. Here is the coupon to fill out and bring into the pharmacy: https://www.[employer]/savings-and-resources/save-on-ozempic.html?[email or web address]    2. We will send in the continuous glucose monitor, FreeStyle Dima 2, to your pharmacy. You can download the FreeStyle Dima 2 application on your phone. Here is a link to videos with instructions for how to use FreeStyle Dima: https://www.freestyle.abbott/us-en/support.html     3. You can get the Shingrix, Hepatitis B, and pneumococcal vaccinations at your pharmacy.     Follow-up: in a few days over MyChart to check on Ozempic and FreeStyle Dima start    SUBJECTIVE/OBJECTIVE:                          Juliann Mitchell is a 56 year old female coming in for an initial visit. She was referred to me from  "Jessica Parham.      Reason for visit: comprehensive medication review, diabetes management.    Allergies/ADRs: Reviewed in chart  Past Medical History: Reviewed in chart  Tobacco: She reports that she has never smoked. She has never used smokeless tobacco.  Alcohol: occasionally   Caffeine: 3 cups coffee/day    Medication Adherence/Access: no issues reported. Per patient, misses medication 0-1 times per week.     Type 2 Diabetes:  Patient is not currently taking any medications for diabetes management.   Patient took metformin in past, but found to be ineffective.   Patient has a personal health goal of losing weight.  Blood sugar monitoring: patient is not currently monitoring blood sugars and does not have any supplies at home. Patient mentions that she is very visually motivated and feels a CGM may be helpful for her.  Symptoms of low blood sugar? none  Symptoms of high blood sugar? Fatigued, \"sluggish\"  Diet/Exercise: walking 2 miles/day; Not consistent with eating breakfast every day. She will usually eat a snack after work but never after dinner. She tries to incorporate protein in her lunch (shakes). Recently started keto meal plans (3 days/week). She enjoys eating sweets, but has been trying not to buy.     GERD: Current medications include: Protonix (pantoprazole) 40 mg once daily, which she has found to be effective. Patient was previously taking omeprazole, but was having frequent gastroesophageal reflux symptoms.     Hyperlipidemia: Current therapy includes atorvastatin 10 mg daily.  Patient denies side effects.     Depression:  Current medications include: Sertraline 100 mg once daily. Occasional dry mouth, but manageable.    Supplements: Currently taking vitamin D 2000 units daily and MVI daily. No reported issues at this time. .    Immunizations: Patient has not yet had Shingrix, Hepatitis B, and pneumococcal vaccinations.    Today's Vitals: Wt 299 lb 12.8 oz (136 kg)   LMP 06/20/2015   BMI 46.96 " kg/m    ----------------  I spent 42 minutes with this patient today. All changes were made via verbal approval with Dr. José Antonio Remy MD (covering for Jessica Parham PA-C). A copy of the visit note was provided to the patient's provider(s).    The patient was given a summary of these recommendations.     Mercedes Hidalgo, EnriqueD   Pharmaceutical Care Resident   Medication Therapy Management    Preceptor cosignature: Juliann RAMIREZ Stephen was seen independently by Dr. Hidalgo. I have reviewed the assessment and plan. Myranda Stewart, PharmD, ZAHIRA, BCACP     Medication Therapy Recommendations  Type 2 diabetes mellitus without complication, without long-term current use of insulin (H)    Current Medication: semaglutide (OZEMPIC, 0.25 OR 0.5 MG/DOSE,) 2 MG/1.5ML SOPN pen   Rationale: Medication requires monitoring - Needs additional monitoring   Recommendation: Self-Monitoring - FreeStyle Dima 2 Sensor Misc   Status: Accepted per Provider          Rationale: Untreated condition - Needs additional medication therapy - Indication   Recommendation: Start Medication - Ozempic (0.25 or 0.5 MG/DOSE) 2 MG/1.5ML Sopn   Status: Accepted per Provider         Vaccine counseling    Rationale: Preventive therapy - Needs additional medication therapy - Indication   Recommendation: Order Vaccine - Shingrix 50 MCG/0.5ML Susr   Status: Accepted - no CPA Needed

## 2022-04-04 ENCOUNTER — OFFICE VISIT (OUTPATIENT)
Dept: PHARMACY | Facility: CLINIC | Age: 57
End: 2022-04-04
Attending: PHYSICIAN ASSISTANT
Payer: COMMERCIAL

## 2022-04-04 VITALS — BODY MASS INDEX: 46.96 KG/M2 | WEIGHT: 293 LBS

## 2022-04-04 DIAGNOSIS — E55.9 VITAMIN D DEFICIENCY: ICD-10-CM

## 2022-04-04 DIAGNOSIS — E78.5 HYPERLIPIDEMIA LDL GOAL <70: ICD-10-CM

## 2022-04-04 DIAGNOSIS — K21.00 GASTROESOPHAGEAL REFLUX DISEASE WITH ESOPHAGITIS, UNSPECIFIED WHETHER HEMORRHAGE: ICD-10-CM

## 2022-04-04 DIAGNOSIS — F32.9 MAJOR DEPRESSIVE DISORDER, REMISSION STATUS UNSPECIFIED, UNSPECIFIED WHETHER RECURRENT: ICD-10-CM

## 2022-04-04 DIAGNOSIS — Z71.85 VACCINE COUNSELING: ICD-10-CM

## 2022-04-04 DIAGNOSIS — E11.9 TYPE 2 DIABETES MELLITUS WITHOUT COMPLICATION, WITHOUT LONG-TERM CURRENT USE OF INSULIN (H): Primary | ICD-10-CM

## 2022-04-04 PROCEDURE — 99607 MTMS BY PHARM ADDL 15 MIN: CPT | Performed by: PHARMACIST

## 2022-04-04 PROCEDURE — 99605 MTMS BY PHARM NP 15 MIN: CPT | Performed by: PHARMACIST

## 2022-04-04 RX ORDER — SEMAGLUTIDE 1.34 MG/ML
INJECTION, SOLUTION SUBCUTANEOUS
Qty: 1.5 ML | Refills: 1 | Status: SHIPPED | OUTPATIENT
Start: 2022-04-04 | End: 2022-06-09

## 2022-04-04 NOTE — PATIENT INSTRUCTIONS
Recommendations from today's MTM visit:                                                    MTM (medication therapy management) is a service provided by a clinical pharmacist designed to help you get the most of out of your medicines.   Today we reviewed what your medicines are for, how to know if they are working, that your medicines are safe and how to make your medicine regimen as easy as possible.      1. Mercedes will talk to Jessica about starting Ozempic. You will inject Ozempic 0.25 mg once weekly for 4 weeks, and then increase to 0.5 mg after.     2. Mercedes will talk to Jessica about sending in a CGM. You can download FreeStyle Dima 2 application on your phone.      Follow-up: After talking with Jessica    It was great to speak with you today.  I value your experience and would be very thankful for your time with providing feedback on our clinic survey. You may receive a survey via email or text message in the next few days.     To schedule another MTM appointment, please call the clinic directly or you may call the MTM scheduling line at [phone] or toll-free at [phone].     My Clinical Pharmacist's contact information:                                                      Please feel free to contact me with any questions or concerns you have.      Mercedes Hidalgo, PharmD   Medication Therapy Management   Pharmacist Resident  Pager: [phone]

## 2022-05-18 ENCOUNTER — VIRTUAL VISIT (OUTPATIENT)
Dept: PHARMACY | Facility: CLINIC | Age: 57
End: 2022-05-18
Payer: COMMERCIAL

## 2022-05-18 DIAGNOSIS — E11.9 TYPE 2 DIABETES MELLITUS WITHOUT COMPLICATION, WITHOUT LONG-TERM CURRENT USE OF INSULIN (H): Primary | ICD-10-CM

## 2022-05-18 PROCEDURE — 99607 MTMS BY PHARM ADDL 15 MIN: CPT | Performed by: PHARMACIST

## 2022-05-18 PROCEDURE — 99606 MTMS BY PHARM EST 15 MIN: CPT | Performed by: PHARMACIST

## 2022-05-18 NOTE — PROGRESS NOTES
Medication Therapy Management (MTM) Encounter    ASSESSMENT:                            Medication Adherence/Access: No issues identified    Type 2 Diabetes: Patient is meeting goal of >70% time in target with continuous glucose monitoring. Since her blood sugars are well controlled, will not increase Ozempic further at this point. Due for A1c in June.     PLAN:                            1. Congratulations on all the success you have had with your improvement in blood sugars and healthy choices! Keep it up!    2. You are due for an A1c in June. I will reach out to you when you are due. You can have this done at the clinic near you.    Follow-up: in June for A1c - over MyChart    SUBJECTIVE/OBJECTIVE:                          Juliann Mitchell is a 56 year old female contacted via secure video for a follow-up visit.  Today's visit is a follow-up MTM visit from 4/4/22     Reason for visit: diabetes follow up.    Allergies/ADRs: Reviewed in chart  Past Medical History: Reviewed in chart  Tobacco: She reports that she has never smoked. She has never used smokeless tobacco.    Medication Adherence/Access: no issues reported  Plans to establish care in early fall with new provider when Jessica changes positions     Type 2 Diabetes:  Currently taking Ozempic 0.5 mg weekly. Reports she has loose stools the day after injections, but not bothersome. Was having nausea with first few doses, but has since improved. Denies other adverse effects from medication.   Blood sugar monitoring: Continuous Glucose Monitor. Ranges (patient reported):   Symptoms of low blood sugar? none  Symptoms of high blood sugar? none  Eye exam: due  Foot exam: due  Diet/Exercise: She has been cutting down on snacking on unhealthy foods. Also reports feeling full faster.   She has been moving around more than normal - walking at work and biking outside.  She doesn't feel she has lost any weight yet.  She has been a little exhausted due to stress from work,  but hopes this will get better soon with end of school year coming.  Urine Albumin:   Lab Results   Component Value Date    UMALCR  03/10/2022      Comment:      Unable to calculate:  Urine creatinine or albumin value below detectable level      Lab Results   Component Value Date    A1C 6.6 03/10/2022    A1C 6.1 04/21/2021    A1C 6.1 06/11/2020    A1C 5.9 02/26/2020    A1C 5.9 07/18/2019    A1C 6.3 06/01/2017     Today's Vitals: LMP 06/20/2015   ----------------  I spent 19 minutes with this patient today. All changes were made via collaborative practice agreement with Jessica Parham PA-C. A copy of the visit note was provided to the patient's provider(s).    The patient was sent via [facility] a summary of these recommendations.     Enrique CamiloD   Pharmaceutical Care Resident   Medication Therapy Management    The patient was seen independently by Dr. Hidalgo.  I have read the note and agree with the assessment and plan.  Selena Goldstein PharmD, [facility] Center[facility]  Medication Therapy Management Provider  Pager: [phone]      Telemedicine Visit Details  Type of service:  Telephone visit  Start Time: 3:00 PM  End Time: 3:19 PM  Originating Location (patient location): Home  Distant Location (provider location):  [facility]     Medication Therapy Recommendations  No medication therapy recommendations to display

## 2022-05-18 NOTE — PATIENT INSTRUCTIONS
"Recommendations from today's MTM visit:                                                       1. Congratulations on all the success you have had with your improvement in blood sugars and healthy choices! Keep it up!    2. You are due for an A1c in June. I will reach out to you when you are due. You can have this done at the clinic near you.    Follow-up: in June for A1c - over MyChart    It was great speaking with you today.  I value your experience and would be very thankful for your time in providing feedback in our clinic survey. In the next few days, you may receive an email or text message from [employer] with a link to a survey related to your  clinical pharmacist.\"     To schedule another MTM appointment, please call the clinic directly or you may call the MTM scheduling line at [phone] or toll-free at [phone].     My Clinical Pharmacist's contact information:                                                      Please feel free to contact me with any questions or concerns you have.      Mercedes Hidalgo, PharmD   Medication Therapy Management   Pharmacist Resident  Pager: [phone]     "

## 2022-06-01 ENCOUNTER — TRANSFERRED RECORDS (OUTPATIENT)
Dept: MULTI SPECIALTY CLINIC | Facility: CLINIC | Age: 57
End: 2022-06-01

## 2022-06-01 LAB — RETINOPATHY: NORMAL

## 2022-06-07 DIAGNOSIS — E11.9 TYPE 2 DIABETES MELLITUS WITHOUT COMPLICATION, WITHOUT LONG-TERM CURRENT USE OF INSULIN (H): ICD-10-CM

## 2022-06-09 RX ORDER — SEMAGLUTIDE 1.34 MG/ML
INJECTION, SOLUTION SUBCUTANEOUS
Qty: 1.5 ML | Refills: 1 | Status: SHIPPED | OUTPATIENT
Start: 2022-06-09 | End: 2022-08-19

## 2022-06-09 NOTE — TELEPHONE ENCOUNTER
Routing refill request to provider for review/approval because:  Drug not active on patient's medication list    Mark Haines RN  [facility] Triage Nurse

## 2022-06-29 ENCOUNTER — LAB (OUTPATIENT)
Dept: LAB | Facility: CLINIC | Age: 57
End: 2022-06-29
Payer: COMMERCIAL

## 2022-06-29 DIAGNOSIS — E11.9 TYPE 2 DIABETES MELLITUS WITHOUT COMPLICATION, WITHOUT LONG-TERM CURRENT USE OF INSULIN (H): ICD-10-CM

## 2022-06-29 LAB — HBA1C MFR BLD: 6.1 % (ref 0–5.6)

## 2022-06-29 PROCEDURE — 83036 HEMOGLOBIN GLYCOSYLATED A1C: CPT

## 2022-06-29 PROCEDURE — 36415 COLL VENOUS BLD VENIPUNCTURE: CPT

## 2022-06-29 NOTE — RESULT ENCOUNTER NOTE
Satnam,    Your hemoglobin A1c went from 6.6% to 6.1% so that is great news!    Jessica Parham PA-C

## 2022-09-12 DIAGNOSIS — E11.9 TYPE 2 DIABETES MELLITUS WITHOUT COMPLICATION, WITHOUT LONG-TERM CURRENT USE OF INSULIN (H): ICD-10-CM

## 2022-09-15 NOTE — TELEPHONE ENCOUNTER
She has not established with a new Provider since dewayne Parham.    Has an appointment with Cecile Soria in [facility] on 10/6/22.    MTM can you please help with this and the appropriate dose (please change the order to the correct dose) until she gets re established?    Thank you,    Aleta Fowler RN  [facility]

## 2022-09-15 NOTE — TELEPHONE ENCOUNTER
It appears patient is taking 0.5mg dose - Rx pended.  I'm unfortunately not able to sign since she hasn't established with a new provider yet.    Selena Goldstein, PharmD, [facility]  Medication Therapy Management Provider  Pager: [phone]

## 2022-09-16 RX ORDER — SEMAGLUTIDE 1.34 MG/ML
0.5 INJECTION, SOLUTION SUBCUTANEOUS WEEKLY
Qty: 1.5 ML | Refills: 0 | Status: SHIPPED | OUTPATIENT
Start: 2022-09-16 | End: 2022-10-07

## 2022-10-06 ENCOUNTER — OFFICE VISIT (OUTPATIENT)
Dept: FAMILY MEDICINE | Facility: CLINIC | Age: 57
End: 2022-10-06
Payer: COMMERCIAL

## 2022-10-06 VITALS
OXYGEN SATURATION: 97 % | BODY MASS INDEX: 45.66 KG/M2 | DIASTOLIC BLOOD PRESSURE: 83 MMHG | TEMPERATURE: 98.5 F | RESPIRATION RATE: 18 BRPM | HEART RATE: 80 BPM | WEIGHT: 290.9 LBS | SYSTOLIC BLOOD PRESSURE: 123 MMHG | HEIGHT: 67 IN

## 2022-10-06 DIAGNOSIS — D64.9 ANEMIA, UNSPECIFIED TYPE: ICD-10-CM

## 2022-10-06 DIAGNOSIS — E04.1 THYROID NODULE: ICD-10-CM

## 2022-10-06 DIAGNOSIS — E78.5 HYPERLIPIDEMIA LDL GOAL <100: ICD-10-CM

## 2022-10-06 DIAGNOSIS — E55.9 VITAMIN D DEFICIENCY: ICD-10-CM

## 2022-10-06 DIAGNOSIS — Z23 HIGH PRIORITY FOR 2019-NCOV VACCINE: ICD-10-CM

## 2022-10-06 DIAGNOSIS — D49.7 PITUITARY TUMOR: ICD-10-CM

## 2022-10-06 DIAGNOSIS — Z23 NEED FOR PROPHYLACTIC VACCINATION AND INOCULATION AGAINST INFLUENZA: ICD-10-CM

## 2022-10-06 DIAGNOSIS — E66.01 MORBID OBESITY (H): ICD-10-CM

## 2022-10-06 DIAGNOSIS — Z90.3 H/O GASTRIC SLEEVE: ICD-10-CM

## 2022-10-06 DIAGNOSIS — E11.9 TYPE 2 DIABETES MELLITUS WITHOUT COMPLICATION, WITHOUT LONG-TERM CURRENT USE OF INSULIN (H): Primary | ICD-10-CM

## 2022-10-06 LAB
ANION GAP SERPL CALCULATED.3IONS-SCNC: 3 MMOL/L (ref 3–14)
BASOPHILS # BLD AUTO: 0 10E3/UL (ref 0–0.2)
BASOPHILS NFR BLD AUTO: 1 %
BUN SERPL-MCNC: 17 MG/DL (ref 7–30)
CALCIUM SERPL-MCNC: 9 MG/DL (ref 8.5–10.1)
CHLORIDE BLD-SCNC: 106 MMOL/L (ref 94–109)
CO2 SERPL-SCNC: 30 MMOL/L (ref 20–32)
CREAT SERPL-MCNC: 0.75 MG/DL (ref 0.52–1.04)
DEPRECATED CALCIDIOL+CALCIFEROL SERPL-MC: 31 UG/L (ref 20–75)
EOSINOPHIL # BLD AUTO: 0.1 10E3/UL (ref 0–0.7)
EOSINOPHIL NFR BLD AUTO: 1 %
ERYTHROCYTE [DISTWIDTH] IN BLOOD BY AUTOMATED COUNT: 12.7 % (ref 10–15)
FERRITIN SERPL-MCNC: 45 NG/ML (ref 8–252)
GFR SERPL CREATININE-BSD FRML MDRD: >90 ML/MIN/1.73M2
GLUCOSE BLD-MCNC: 98 MG/DL (ref 70–99)
HBA1C MFR BLD: 5.7 % (ref 0–5.6)
HCT VFR BLD AUTO: 40.8 % (ref 35–47)
HGB BLD-MCNC: 13.6 G/DL (ref 11.7–15.7)
IMM GRANULOCYTES # BLD: 0 10E3/UL
IMM GRANULOCYTES NFR BLD: 0 %
LYMPHOCYTES # BLD AUTO: 2.1 10E3/UL (ref 0.8–5.3)
LYMPHOCYTES NFR BLD AUTO: 49 %
MCH RBC QN AUTO: 29.2 PG (ref 26.5–33)
MCHC RBC AUTO-ENTMCNC: 33.3 G/DL (ref 31.5–36.5)
MCV RBC AUTO: 88 FL (ref 78–100)
MONOCYTES # BLD AUTO: 0.3 10E3/UL (ref 0–1.3)
MONOCYTES NFR BLD AUTO: 7 %
NEUTROPHILS # BLD AUTO: 1.8 10E3/UL (ref 1.6–8.3)
NEUTROPHILS NFR BLD AUTO: 42 %
PLATELET # BLD AUTO: 189 10E3/UL (ref 150–450)
POTASSIUM BLD-SCNC: 3.8 MMOL/L (ref 3.4–5.3)
RBC # BLD AUTO: 4.66 10E6/UL (ref 3.8–5.2)
SODIUM SERPL-SCNC: 139 MMOL/L (ref 133–144)
TSH SERPL DL<=0.005 MIU/L-ACNC: 1.88 MU/L (ref 0.4–4)
VIT B12 SERPL-MCNC: 414 PG/ML (ref 232–1245)
WBC # BLD AUTO: 4.3 10E3/UL (ref 4–11)

## 2022-10-06 PROCEDURE — 90471 IMMUNIZATION ADMIN: CPT | Performed by: FAMILY MEDICINE

## 2022-10-06 PROCEDURE — 85025 COMPLETE CBC W/AUTO DIFF WBC: CPT | Performed by: FAMILY MEDICINE

## 2022-10-06 PROCEDURE — 80048 BASIC METABOLIC PNL TOTAL CA: CPT | Performed by: FAMILY MEDICINE

## 2022-10-06 PROCEDURE — 82728 ASSAY OF FERRITIN: CPT | Performed by: FAMILY MEDICINE

## 2022-10-06 PROCEDURE — 90677 PCV20 VACCINE IM: CPT | Performed by: FAMILY MEDICINE

## 2022-10-06 PROCEDURE — 0134A COVID-19,PF,MODERNA BIVALENT: CPT | Performed by: FAMILY MEDICINE

## 2022-10-06 PROCEDURE — 91313 COVID-19,PF,MODERNA BIVALENT: CPT | Performed by: FAMILY MEDICINE

## 2022-10-06 PROCEDURE — 84443 ASSAY THYROID STIM HORMONE: CPT | Performed by: FAMILY MEDICINE

## 2022-10-06 PROCEDURE — 99214 OFFICE O/P EST MOD 30 MIN: CPT | Mod: 25 | Performed by: FAMILY MEDICINE

## 2022-10-06 PROCEDURE — 90682 RIV4 VACC RECOMBINANT DNA IM: CPT | Performed by: FAMILY MEDICINE

## 2022-10-06 PROCEDURE — 36415 COLL VENOUS BLD VENIPUNCTURE: CPT | Performed by: FAMILY MEDICINE

## 2022-10-06 PROCEDURE — 99207 PR FOOT EXAM NO CHARGE: CPT | Performed by: FAMILY MEDICINE

## 2022-10-06 PROCEDURE — 90472 IMMUNIZATION ADMIN EACH ADD: CPT | Performed by: FAMILY MEDICINE

## 2022-10-06 PROCEDURE — 82607 VITAMIN B-12: CPT | Performed by: FAMILY MEDICINE

## 2022-10-06 PROCEDURE — 83036 HEMOGLOBIN GLYCOSYLATED A1C: CPT | Performed by: FAMILY MEDICINE

## 2022-10-06 PROCEDURE — 82306 VITAMIN D 25 HYDROXY: CPT | Performed by: FAMILY MEDICINE

## 2022-10-06 RX ORDER — ATORVASTATIN CALCIUM 20 MG/1
20 TABLET, FILM COATED ORAL DAILY
Qty: 90 TABLET | Refills: 1 | Status: SHIPPED | OUTPATIENT
Start: 2022-10-06 | End: 2023-04-03

## 2022-10-06 ASSESSMENT — PAIN SCALES - GENERAL: PAINLEVEL: NO PAIN (0)

## 2022-10-06 ASSESSMENT — PATIENT HEALTH QUESTIONNAIRE - PHQ9
SUM OF ALL RESPONSES TO PHQ QUESTIONS 1-9: 1
10. IF YOU CHECKED OFF ANY PROBLEMS, HOW DIFFICULT HAVE THESE PROBLEMS MADE IT FOR YOU TO DO YOUR WORK, TAKE CARE OF THINGS AT HOME, OR GET ALONG WITH OTHER PEOPLE: NOT DIFFICULT AT ALL
SUM OF ALL RESPONSES TO PHQ QUESTIONS 1-9: 1

## 2022-10-06 NOTE — PATIENT INSTRUCTIONS
I encourage you to consider the vaccine to prevent shingles.  (SHINGRIX)  This is two vaccines 2-6 months apart.   Check with your insurance company regarding coverage.  If you are on Medicare you should get this vaccine at the pharmacy      Start 81 mg baby aspirin daily for prevention    Check with endocrinology on if you are due for a follow-up visit with her.

## 2022-10-06 NOTE — PROGRESS NOTES
Assessment & Plan     Type 2 diabetes mellitus without complication, without long-term current use of insulin (H)  This is been under excellent control on GLP-1 receptor agonist and CGM.  - Basic metabolic panel  (Ca, Cl, CO2, Creat, Gluc, K, Na, BUN); Future  - Basic metabolic panel  (Ca, Cl, CO2, Creat, Gluc, K, Na, BUN)  - FOOT EXAM    H/O gastric sleeve  Morbid obesity (H)  Has had good success thus far with semaglutide but is interested in transitioning to mounjaro to see if this gives her better weight loss.  She has reviewed this with her pharmacist previously.  I will message her Pharm.D. team to get some assistance with transitioning this medication as appropriate.  - Vitamin B12; Future  - Ferritin; Future  - CBC with platelets and differential; Future  - Basic metabolic panel  (Ca, Cl, CO2, Creat, Gluc, K, Na, BUN); Future  - Hemoglobin A1c; Future  - Vitamin D Deficiency; Future  - Vitamin B12; Future  - Ferritin  - CBC with platelets and differential  - Basic metabolic panel  (Ca, Cl, CO2, Creat, Gluc, K, Na, BUN)  - Hemoglobin A1c  - Vitamin D Deficiency  - Vitamin B12    Hyperlipidemia LDL goal <100  Has been on 10 mg atorvastatin and LDL has not been at goal.  Will increase to 20 mg daily  - atorvastatin (LIPITOR) 20 MG tablet; Take 1 tablet (20 mg) by mouth daily    Thyroid nodule  She wonders if the left-sided nodule is increasing.  We will get updated ultrasound and encouraged her to call endocrinology to see if she is due for follow-up.  Certainly if ultrasound confirms enlargement would have her see endocrinology again  - US Thyroid; Future    Vitamin D deficiency  History of    Anemia, unspecified type  This is located on her problem list.  She is unsure of what her anemia was connected to.  - Vitamin B12; Future  - Ferritin; Future  - CBC with platelets and differential; Future  - Vitamin B12; Future  - TSH with free T4 reflex; Future  - Ferritin  - CBC with platelets and differential  -  "Vitamin B12  - TSH with free T4 reflex    Need for prophylactic vaccination and inoculation against influenza  Flu vaccine given today    High priority for 2019-nCoV vaccine  - COVID-19,PF,MODERNA BIVALENT 18+Yrs         BMI:   Estimated body mass index is 45.22 kg/m  as calculated from the following:    Height as of this encounter: 1.708 m (5' 7.25\").    Weight as of this encounter: 132 kg (290 lb 14.4 oz).       Patient Instructions   I encourage you to consider the vaccine to prevent shingles.  (SHINGRIX)  This is two vaccines 2-6 months apart.   Check with your insurance company regarding coverage.  If you are on Medicare you should get this vaccine at the pharmacy      Start 81 mg baby aspirin daily for prevention    Check with endocrinology on if you are due for a follow-up visit with her.           Return in about 6 months (around 4/6/2023) for Follow up, Routine preventive.    Cecile Soria MD  [facility] ABDULLAHI BUCK is a 56 year old, presenting for the following health issues:  Establish Care, Imm/Inj (Flu Shot), and Imm/Inj (COVID-19 VACCINE)      History of Present Illness       Diabetes:   She presents for follow up of diabetes.  She is checking home blood glucose three times daily. She checks blood glucose before meals, after meals, before and after meals and at bedtime.  Blood glucose is never over 200 and sometimes under 70. She is aware of hypoglycemia symptoms including none, shakiness and weakness. She has no concerns regarding her diabetes at this time.  She is having numbness in feet. The patient has had a diabetic eye exam in the last 12 months. Eye exam performed on 6/2022. Location of last eye exam [facility].        She eats 2-3 servings of fruits and vegetables daily.She consumes 0 sweetened beverage(s) daily.She exercises with enough effort to increase her heart rate 30 to 60 minutes per day.  She exercises with enough effort to increase her heart " "rate 5 days per week.   She is taking medications regularly.    Today's PHQ-9         PHQ-9 Total Score: 1    PHQ-9 Q9 Thoughts of better off dead/self-harm past 2 weeks :   Not at all    How difficult have these problems made it for you to do your work, take care of things at home, or get along with other people: Not difficult at all       On ozempic since April. Down 9 Lbs (does not want to see her weight though in the chart today).   Following with pharm D.    Cgm- feels glucose has been consistently in goal zone.   CGM has helped her be more mindful with choices of what she eats  Appetite and sweet desire is down with ozempic    Hx of gastric sleeve- lost wt initially and gained back. Moving more and working on healthy diet.     Work at [employer] in the [employer] department    gerd well controlled. Was pretty bad for a while    ? Thyroid nodules getting bigger on the left. Lump sensation with swallowing but that has been there for a while.  Did see endocrinology about a year ago at [facility].  She is uncertain if follow-up is needed..     zoloft working well and happy with current dose    Review of Systems   Constitutional, HEENT, cardiovascular, pulmonary, gi and gu systems are negative, except as otherwise noted.      Objective    /83 (BP Location: Right arm, Patient Position: Sitting, Cuff Size: Adult Large)   Pulse 80   Temp 98.5  F (36.9  C) (Oral)   Resp 18   Ht 1.708 m (5' 7.25\")   Wt 132 kg (290 lb 14.4 oz)   LMP 06/20/2015   SpO2 97%   BMI 45.22 kg/m    Body mass index is 45.22 kg/m .  Physical Exam   GENERAL: healthy, alert and no distress  NECK: no adenopathy, no asymmetry, masses, or scars and thyroid nodule left side  RESP: lungs clear to auscultation - no rales, rhonchi or wheezes  CV: regular rate and rhythm, normal S1 S2, no S3 or S4, no murmur, click or rub, no peripheral edema and peripheral pulses strong  MS: no gross musculoskeletal defects noted, no " edema  PSYCH: mentation appears normal, affect normal/bright  Diabetic foot exam: Normal monofilament sensation.  No significant callus.  Pulses are intact

## 2022-10-07 DIAGNOSIS — E11.9 TYPE 2 DIABETES MELLITUS WITHOUT COMPLICATION, WITHOUT LONG-TERM CURRENT USE OF INSULIN (H): Primary | ICD-10-CM

## 2022-10-07 DIAGNOSIS — E66.01 MORBID OBESITY (H): ICD-10-CM

## 2022-10-07 RX ORDER — TIRZEPATIDE 2.5 MG/.5ML
2.5 INJECTION, SOLUTION SUBCUTANEOUS WEEKLY
Qty: 2 ML | Refills: 1 | Status: SHIPPED | OUTPATIENT
Start: 2022-10-07 | End: 2022-11-16 | Stop reason: DRUGHIGH

## 2022-10-07 NOTE — RESULT ENCOUNTER NOTE
Satnam,  Just a quick update on labs back thus far.   - the vitamin D looks good.  Please continue your current supplementation.  -The vitamin B12 is normal, but lower range.  Please make sure you are taking a daily vitamin B-12 supplement either in the form of a multivitamin or an individual vitamin B12 tablet (500-1000 mcg daily).   -Blood count panel, iron test and thyroid test were normal.  - the kidney and electrolyte panel looks good.  -A1c shows fabulous control of your diabetes.  Your average blood glucose level is almost back to the normal range.  Please MyChart or call if you have any concerns or questions.   Sincerely,  Cecile Soria MD

## 2022-10-25 ENCOUNTER — ANCILLARY PROCEDURE (OUTPATIENT)
Dept: ULTRASOUND IMAGING | Facility: CLINIC | Age: 57
End: 2022-10-25
Attending: FAMILY MEDICINE
Payer: COMMERCIAL

## 2022-10-25 DIAGNOSIS — E04.1 THYROID NODULE: ICD-10-CM

## 2022-10-25 PROCEDURE — 76536 US EXAM OF HEAD AND NECK: CPT | Performed by: RADIOLOGY

## 2022-10-25 NOTE — RESULT ENCOUNTER NOTE
Michele Hay,  Overall, the thyroid nodules appear stable with no new worrisome findings. Plan to check in with your endocrinology office and when they want to see you next to follow-up these nodules along.  Kind regards,  Cecile Soria MD

## 2022-11-16 ENCOUNTER — VIRTUAL VISIT (OUTPATIENT)
Dept: PHARMACY | Facility: CLINIC | Age: 57
End: 2022-11-16
Payer: COMMERCIAL

## 2022-11-16 DIAGNOSIS — F32.9 MAJOR DEPRESSIVE DISORDER, REMISSION STATUS UNSPECIFIED, UNSPECIFIED WHETHER RECURRENT: ICD-10-CM

## 2022-11-16 DIAGNOSIS — E55.9 VITAMIN D DEFICIENCY: ICD-10-CM

## 2022-11-16 DIAGNOSIS — E78.5 HYPERLIPIDEMIA LDL GOAL <70: ICD-10-CM

## 2022-11-16 DIAGNOSIS — E11.9 TYPE 2 DIABETES MELLITUS WITHOUT COMPLICATION, WITHOUT LONG-TERM CURRENT USE OF INSULIN (H): Primary | ICD-10-CM

## 2022-11-16 DIAGNOSIS — K21.00 GASTROESOPHAGEAL REFLUX DISEASE WITH ESOPHAGITIS, UNSPECIFIED WHETHER HEMORRHAGE: ICD-10-CM

## 2022-11-16 PROCEDURE — 99606 MTMS BY PHARM EST 15 MIN: CPT | Performed by: PHARMACIST

## 2022-11-16 PROCEDURE — 99607 MTMS BY PHARM ADDL 15 MIN: CPT | Performed by: PHARMACIST

## 2022-11-16 RX ORDER — UREA 10 %
500 LOTION (ML) TOPICAL DAILY
COMMUNITY

## 2022-11-16 RX ORDER — TIRZEPATIDE 5 MG/.5ML
5 INJECTION, SOLUTION SUBCUTANEOUS WEEKLY
Qty: 2 ML | Refills: 0 | Status: SHIPPED | OUTPATIENT
Start: 2022-11-16 | End: 2022-12-21 | Stop reason: DRUGHIGH

## 2022-11-16 NOTE — PATIENT INSTRUCTIONS
"Recommendations from today's MTM visit:                                                    MTM (medication therapy management) is a service provided by a clinical pharmacist designed to help you get the most of out of your medicines.   Today we reviewed what your medicines are for, how to know if they are working, that your medicines are safe and how to make your medicine regimen as easy as possible.      Finish your last 2 pens for the 2.5mg dose of Mounjaro, then increase to 5mg weekly.  Try using the Dima sensors on your abdomen instead of your arm.  This is not a FDA approved site, but many patients find less issues with placing it there!    Follow-up: Return in about 5 weeks (around 12/21/2022) for diabetes follow-up.    It was great speaking with you today.  I value your experience and would be very thankful for your time in providing feedback in our clinic survey. In the next few days, you may receive an email or text message from [employer] with a link to a survey related to your  clinical pharmacist.\"     To schedule another MTM appointment, please call the clinic directly or you may call the MTM scheduling line at [phone] or toll-free at [phone].     My Clinical Pharmacist's contact information:                                                      Please feel free to contact me with any questions or concerns you have.      Radha Ta, PharmD  Medication Therapy Management Resident  [phone]    Selena Goldstein PharmD, [facility]  Medication Therapy Management Provider  Pager: [phone]     "

## 2022-11-16 NOTE — Clinical Note
MICHELLEI - she's doing great on Mounjaro, is down 10# per her report.  We increased to 5mg and will follow-up next month! Selena Goldstein, PharmD, [facility] Medication Therapy Management Provider Pager: [phone]

## 2022-11-16 NOTE — PROGRESS NOTES
Medication Therapy Management (MTM) Encounter    ASSESSMENT:                            Medication Adherence/Access: No issues identified    Type 2 Diabetes: Patient is meeting A1c goal of < 7%. Patient is meeting goal of >70% time in target with continuous glucose monitoring (although limited data due to sensor concerns).  She'd like to increase Mounjaro dose to optimize weight loss effect, I agree this is reasonable.  May benefit from trying to use Dima sensors on her abdomen to see if this works better for her (not a FDA approved site).  Notes indicate patient is post-menopausal, no back up contraception needed with Mounjaro initiation/dose titration.    Hyperlipidemia: Stable.     GERD: Stable.     Depression: Stable.    Supplements:  Stable.    PLAN:                            1.  Increased Mounjaro to 5mg weekly.  2.  Suggested placing Dima 2 sensor on her abdomen instead of arm to see if this sticks better/fewer false alarms.  We discussed that this is not a FDA approved site, but has been used.    Follow-up: Return in about 5 weeks (around 12/21/2022) for diabetes follow-up.    SUBJECTIVE/OBJECTIVE:                          Juliann Mitchell is a 56 year old female called for a follow-up visit.  Today's visit is a follow-up MTM visit from 5/18/22.     Reason for visit: Switch to Mounjaro.    Tobacco: She reports that she has never smoked. She has never used smokeless tobacco.  Alcohol: occasionally    Medication Adherence/Access: no issues reported    Type 2 Diabetes:  Currently taking Mounjaro 2.5mg weekly - switched from Ozempic about 6 weeks ago. Patient is experiencing the following side effects: feeling full, slight headache  Blood sugar monitoring: Continuous Glucose Monitor - having some trouble with alarms going off at night (without hypoglycemia) and sensors not working    Symptoms of low blood sugar? none  Symptoms of high blood sugar? none  Eye exam: up to date  Foot exam: up to  "date  Diet/Exercise: She is pleased that she's losing weight - thinks she's down about 10# since starting Mounjaro  Aspirin: Taking 81mg daily for primary prevention and denies side effects  Statin: Yes: atorvastatin 20mg daily    ACEi/ARB: No.   Urine Albumin:   Lab Results   Component Value Date    UMALCR  03/10/2022      Comment:      Unable to calculate:  Urine creatinine or albumin value below detectable level      Lab Results   Component Value Date    A1C 5.7 10/06/2022    A1C 6.1 06/29/2022    A1C 6.6 03/10/2022    A1C 6.1 04/21/2021    A1C 6.1 06/11/2020    A1C 5.9 02/26/2020    A1C 5.9 07/18/2019    A1C 6.3 06/01/2017     BP Readings from Last 3 Encounters:   10/06/22 123/83   03/10/22 136/89   05/19/21 115/77      Hyperlipidemia: Current therapy includes atorvastatin 20mg daily, dose increased by primary care physician.  Patient reports no significant myalgias or other side effects.  The 10-year ASCVD risk score (Cameron SHAH, et al., 2019) is: 4%    Values used to calculate the score:      Age: 56 years      Sex: Female      Is Non- : No      Diabetic: Yes      Tobacco smoker: No      Systolic Blood Pressure: 123 mmHg      Is BP treated: No      HDL Cholesterol: 61 mg/dL      Total Cholesterol: 204 mg/dL     Recent Labs   Lab Test 03/10/22  1603 04/21/21  0000 07/08/16  0908 09/21/15  0829   CHOL 204* 171   < > 211*   HDL 61 56   < > 46*   * 94   < > 140*   TRIG 133 112   < > 124   CHOLHDLRATIO  --   --   --  4.6    < > = values in this interval not displayed.     GERD: Current medications include: Protonix (pantoprazole) 40mg once daily. Patient reports no current symptoms.  Patient feels that current regimen is effective, says \"that's been working great.\"    Depression:  Current medications include: sertraline 100mg daily. Patient reports that depression symptoms are stable.  No suicidal ideation reported.  PHQ-9 SCORE 3/25/2021 12/31/2021 10/6/2022   PHQ-9 Total Score - - - " "  PHQ-9 Total Score [facility] - 3 (Minimal depression) 1 (Minimal depression)   PHQ-9 Total Score 4 3 1     Supplements:  Patient takes Vitamin D 2000 international unit(s) daily, Flintstones Complete \"most of the time\" and Vitamin B12 500mcg daily (started after October lab).  She denies side effects.  Lab Results   Component Value Date    B12 414 10/06/2022      Today's Vitals: LMP 06/20/2015   ----------------    I spent 18 minutes with this patient today. All changes were made via collaborative practice agreement with Dr. Soria. A copy of the visit note was provided to the patient's provider(s).    The patient was sent via [employer] a summary of these recommendations.     Enrique SantanaD  Medication Therapy Management Resident  [phone]    Selena Goldstein PharmD, [facility]  Medication Therapy Management Provider  Pager: [phone]      Telemedicine Visit Details  Type of service:  Telephone visit  Start Time: 3:04 PM  End Time: 3:22 PM  Originating Location (pt. Location): Home      Distant Location (provider location):  On-site  Provider has received verbal consent for a visit from the patient? Yes     Medication Therapy Recommendations  Type 2 diabetes mellitus without complication, without long-term current use of insulin (H)    Current Medication: Tirzepatide (MOUNJARO) 2.5 MG/0.5ML SOPN (Discontinued)   Rationale: Dose too low - Dosage too low - Effectiveness   Recommendation: Increase Dose - Mounjaro 5 MG/0.5ML Sopn   Status: Accepted per CPA              "

## 2022-12-21 ENCOUNTER — VIRTUAL VISIT (OUTPATIENT)
Dept: PHARMACY | Facility: CLINIC | Age: 57
End: 2022-12-21
Payer: COMMERCIAL

## 2022-12-21 DIAGNOSIS — K59.00 CONSTIPATION, UNSPECIFIED CONSTIPATION TYPE: ICD-10-CM

## 2022-12-21 DIAGNOSIS — E66.01 MORBID OBESITY (H): ICD-10-CM

## 2022-12-21 DIAGNOSIS — E11.9 TYPE 2 DIABETES MELLITUS WITHOUT COMPLICATION, WITHOUT LONG-TERM CURRENT USE OF INSULIN (H): Primary | ICD-10-CM

## 2022-12-21 DIAGNOSIS — K59.00 CONSTIPATION: ICD-10-CM

## 2022-12-21 PROCEDURE — 99606 MTMS BY PHARM EST 15 MIN: CPT | Performed by: PHARMACIST

## 2022-12-21 PROCEDURE — 99607 MTMS BY PHARM ADDL 15 MIN: CPT | Performed by: PHARMACIST

## 2022-12-21 RX ORDER — POLYETHYLENE GLYCOL 3350 17 G/17G
1 POWDER, FOR SOLUTION ORAL DAILY
COMMUNITY
End: 2023-09-21

## 2022-12-21 RX ORDER — TIRZEPATIDE 7.5 MG/.5ML
7.5 INJECTION, SOLUTION SUBCUTANEOUS WEEKLY
Qty: 2 ML | Refills: 0 | Status: SHIPPED | OUTPATIENT
Start: 2022-12-21 | End: 2023-01-04 | Stop reason: DRUGHIGH

## 2022-12-21 NOTE — PATIENT INSTRUCTIONS
"Recommendations from today's MTM visit:                                                      1. Increase Mounjaro to 7.5mg once weekly   2. When you have blood sugars less than 70 use 15gm of a fast acting sugar such as glucose tablets & check your blood sugar again in 15 minutes to ensure that it increased to >70.  3. Increase fiber intake in diet and ensure proper hydration to help with bowels and constipation.     Follow-up: 1/18/2023 at 3:00pm    It was great speaking with you today.  I value your experience and would be very thankful for your time in providing feedback in our clinic survey. In the next few days, you may receive an email or text message from [facility] [employer] with a link to a survey related to your  clinical pharmacist.\"     To schedule another MTM appointment, please call the clinic directly or you may call the MTM scheduling line at [phone] or toll-free at [phone].     My Clinical Pharmacist's contact information:                                                      Please feel free to contact me with any questions or concerns you have.      Radha Ta, PharmD  Medication Therapy Management Resident  [phone]    Enrique TylerD, [facility]  Medication Therapy Management Provider  Pager: [phone]     "

## 2022-12-21 NOTE — PROGRESS NOTES
Medication Therapy Management (MTM) Encounter    ASSESSMENT:                            Medication Adherence/Access: No issues identified    Type 2 Diabetes: Patient is meeting A1c goal of < 7% and is within time in range >70% of the time. Educated on how to properly correct for when sugars get less than 70.  Recommended having glucose tablets on hand and using 15gm of a fast acting sugar such as the glucose tablets when sugars get less than 70.  Theoretically Mounjaro should not be causing hypoglycemia however patient appears to be having a few readings less than 70 recently. Will continue to monitor.      Constipation: Recommended to continue to use 17g once daily and that typically having one bowel movement a day is normal.  Despite recent increase in constipation, patient is still wanting to increase Mounjaro dose.  Suggested it to increase fiber intake through diet and continue to stay hydrated throughout the day. Will continue to monitor.     Weight management: Since starting Mounjaro, the patient has not seen a great reduction in weight loss.  Starting Mounjaro at lower doses for proper titration likely was not equivalent to previous Ozempic dose the patient was on, would recommend an increase in Mounjaro dose for greater weight loss benefit.  Likely will not have a huge impact in blood sugars at higher doses but more so additional weight loss benefit.    PLAN:                            1. Increase Mounjaro to 7.5mg once weekly   2. When you have blood sugars less than 70 use 15gm of a fast acting sugar such as glucose tablets and check your blood sugar again in 15 minutes to ensure that it increased to >70.  3. Increase fiber intake in diet and ensure proper hydration to help with bowels and constipation.     Follow-up: 1/18/2023 at 3:00pm    SUBJECTIVE/OBJECTIVE:                          Juliann Mitchell is a 57 year old female called for a follow-up visit.  Today's visit is a follow-up MTM visit from  11/16/22     Reason for visit: Diabetes follow up    Allergies/ADRs: Reviewed in chart  Past Medical History: Reviewed in chart  Tobacco: She reports that she has never smoked. She has never used smokeless tobacco.    Medication Adherence/Access: no issues reported    Type 2 Diabetes:  Currently taking Mounjaro 5mg weekly. Developed some constipation- has been using miralax (see constipation section below).   Blood sugar monitoring: Continuous Glucose Monitor:       Symptoms of low blood sugar? none, Frequency of lows- per the the [facility] patient report patient has had 2 instances of blood sugar readings <70 over night, the patient did not feel any different during these times and thinks that maybe the sensor may be acting up.   Symptoms of high blood sugar? none  Eye exam: up to date  Foot exam: up to date  Diet/Exercise: Had a piece of pie recently to celebrate her birthday and felt really full after eating it and that it made her feel like she didn't like the pie anymore.    Aspirin: Taking 81mg daily for primary prevention   Statin: Yes: atorvastatin 20mg daily    ACEi/ARB: No- not indicated  Urine Albumin:   Lab Results   Component Value Date    UMALCR  03/10/2022      Comment:      Unable to calculate:  Urine creatinine or albumin value below detectable level      Lab Results   Component Value Date    A1C 5.7 10/06/2022    A1C 6.1 06/29/2022    A1C 6.6 03/10/2022    A1C 6.1 04/21/2021    A1C 6.1 06/11/2020    A1C 5.9 02/26/2020    A1C 5.9 07/18/2019    A1C 6.3 06/01/2017     Constipation: Current therapy includes Miralax 17g daily, Used to have a bowel movement 2-3x day, but now has been about once a day with increase in Mounjaro dose. Patient also thinks that a change in her diet and not drinking enough water throughout the day has contributed to constipation as well.     Weight management: Current therapy includes Mounjaro 5mg once weekly.  While patient has noticed affected blood sugars, has not noticed  an increase in weight loss since starting Mounjaro.    Today's Vitals: LMP 06/20/2015   ----------------  I spent 23 minutes with this patient today. All changes were made via verbal approval with Dr. Soria. A copy of the visit note was provided to the patient's provider(s).    A summary of these recommendations was sent via [employer].    Radha Ta PharmD  Medication Therapy Management Resident  [phone]    The patient was seen independently by Dr. Ta  I have read the note and agree with the assessment and plan.  Selena Goldstein PharmD, [facility]  Medication Therapy Management Provider  Pager: [phone]      Telemedicine Visit Details  Type of service:  Telephone visit  Start Time: 10:01AM  End Time: 10:25 AM  Originating Location (pt. Location): Home      Distant Location (provider location):  Off-site  Provider has received verbal consent for a visit from the patient? Yes     Medication Therapy Recommendations  Type 2 diabetes mellitus without complication, without long-term current use of insulin (H)    Current Medication: Tirzepatide (MOUNJARO) 5 MG/0.5ML SOPN (Discontinued)   Rationale: Dose too low - Dosage too low - Effectiveness   Recommendation: Increase Dose   Status: Accepted per Provider          Rationale: Untreated condition - Needs additional medication therapy - Indication   Recommendation: Start Medication   Status: Accepted per Provider   Note: glucose tablets

## 2022-12-21 NOTE — Clinical Note
Miya Soria,   I had the pleasure of meeting with Satnam today, for some reason she has been having a few blood sugar readings <70 over the last couple of nights. I am wondering if you would be okay with me sending in a prescription for glucose tablets for her to have on hand?   Thanks,  Radha Ta, PharmD Medication Therapy Management Resident [phone]

## 2022-12-22 NOTE — PROGRESS NOTES
Approval from Dr. Soria for glucose tablets.     Radha Ta, PharmD  Medication Therapy Management Resident  [phone]

## 2022-12-22 NOTE — ADDENDUM NOTE
Addended by: CORRINE ENCARNACION on: 12/22/2022 09:44 AM     Modules accepted: Orders     Samara called said insurance isn't covering lantis injection pen, but they will cover basgular, levemir or tresba. She would like a call back to discuss [phone] (h).

## 2023-02-15 ENCOUNTER — VIRTUAL VISIT (OUTPATIENT)
Dept: PHARMACY | Facility: CLINIC | Age: 58
End: 2023-02-15
Payer: COMMERCIAL

## 2023-02-15 DIAGNOSIS — E11.9 TYPE 2 DIABETES MELLITUS WITHOUT COMPLICATION, WITHOUT LONG-TERM CURRENT USE OF INSULIN (H): Primary | ICD-10-CM

## 2023-02-15 DIAGNOSIS — F32.9 MAJOR DEPRESSIVE DISORDER, REMISSION STATUS UNSPECIFIED, UNSPECIFIED WHETHER RECURRENT: ICD-10-CM

## 2023-02-15 DIAGNOSIS — K21.00 GASTROESOPHAGEAL REFLUX DISEASE WITH ESOPHAGITIS, UNSPECIFIED WHETHER HEMORRHAGE: ICD-10-CM

## 2023-02-15 DIAGNOSIS — Z78.9 TAKES DIETARY SUPPLEMENTS: ICD-10-CM

## 2023-02-15 DIAGNOSIS — K59.00 CONSTIPATION, UNSPECIFIED CONSTIPATION TYPE: ICD-10-CM

## 2023-02-15 DIAGNOSIS — E66.01 MORBID OBESITY (H): ICD-10-CM

## 2023-02-15 DIAGNOSIS — E78.5 HYPERLIPIDEMIA LDL GOAL <70: ICD-10-CM

## 2023-02-15 PROCEDURE — 99607 MTMS BY PHARM ADDL 15 MIN: CPT

## 2023-02-15 PROCEDURE — 99605 MTMS BY PHARM NP 15 MIN: CPT

## 2023-02-15 RX ORDER — TIRZEPATIDE 7.5 MG/.5ML
7.5 INJECTION, SOLUTION SUBCUTANEOUS WEEKLY
Qty: 2 ML | Refills: 1 | Status: SHIPPED | OUTPATIENT
Start: 2023-02-15 | End: 2023-04-20 | Stop reason: DRUGHIGH

## 2023-02-15 NOTE — PROGRESS NOTES
Medication Therapy Management (MTM) Encounter    ASSESSMENT:                            Medication Adherence/Access: No issues identified    Type 2 Diabetes:  Patient's time in range is at goal of >70%, see weight management section below regarding plain for Mounjaro titration. Patient has not started using glucose tablets as recommended at last MTM visit when having occasional low blood sugars. Unknown if low blood sugars are a sensor error or truly a low blood sugar since Mounjaro mechanism wise should not increase risk of hypoglycemia, recommended eating a small snack before bed to help prevent overnight lows.     Weight management: Would recommend staying at current Mounjaro dose for the time being, could consider increasing again at future visit when patient is better able to tolerate current dose. Recommend patient to get routine labs at upcoming visit with Dr. Soria, could also consider adding pancreas labs to ensure pancreas has not been effected by Mounjaro. Educated that while not directly studied, it can be inferred that Mounjaro would have kidney protection and ASCVD risk reduction as Ozempic and other GLP1 therapies do.      Depression: Stable, will send instructions on how to properly use therapy light.       Hyperlipidemia: Patient is not meeting LDL goal of <70, levels were last checked when patient was only on atorvastatin 10mg daily, plan to obtain updated cholesterol levels at upcoming appointment with Dr. Soria.    GERD: Stable.     Constipation: Stable.     Supplements: Stable, could consider increasing vitamin D dose if levels remain on the lower end of normal to help with increasing in energy/mood. Plan to re-check vitamin D levels at upcoming appointment with Dr. Soria.     PLAN:                            1. Continue Mounjaro 7.5mg weekly  2. See attached instructions regarding light therapy.     Follow-up: appointment scheduled for 4/20/2023.   PCP: 4/10/2023.      SUBJECTIVE/OBJECTIVE:                          Juliann Mitchell is a 57 year old female called for a follow-up visit.  Today's visit is a follow-up MTM visit from 12/21/2022.     Reason for visit: Diabetes follow up.     Allergies/ADRs: Reviewed in chart  Past Medical History: Reviewed in chart  Tobacco: She reports that she has never smoked. She has never used smokeless tobacco.  Alcohol: Did not have time to assess today.     Medication Adherence/Access: no issues reported    Type 2 Diabetes:  Currently taking Mounjaro 7.5mg once weekly. Was having some troubles when she first started on the 7.5mg dose because there was a couple weeks between transitioning between the 5mg dose and the 7.5mg due to national supply shortage. For the first two Mounjaro 7.5mg doses she was feeling very nauseated and having a great amount of appetite suppression and all around feeling ill. Was able to tolerate most recent 7.5mg dose with minimal side effects.   Blood sugar monitoring: Continuous Glucose Monitor.       Symptoms of low blood sugar? Frequency of lows- Still having 1 or two occasional lows overnight during the past 2 weeks. Patient finds that if she lays on her sensor overnight that this usually causes her alarm to go off and indicate that she is having a low blood sugar. Usually does not have symptoms during these times.   Symptoms of high blood sugar? none  Eye exam: up to date  Foot exam: up to date  Diet/Exercise: Patient has been having an increase in appetite suppression with increasing Mounjaro dose.   Aspirin: Taking 81mg daily for primary prevention   Statin: Yes: atorvastatin 20mg daily   ACEi/ARB: No.   Urine Albumin:   Lab Results   Component Value Date    UMALCR  03/10/2022      Comment:      Unable to calculate:  Urine creatinine or albumin value below detectable level      Lab Results   Component Value Date    A1C 5.7 10/06/2022    A1C 6.1 06/29/2022    A1C 6.6 03/10/2022    A1C 6.1 04/21/2021    A1C 6.1  06/11/2020    A1C 5.9 02/26/2020    A1C 5.9 07/18/2019    A1C 6.3 06/01/2017     Weight management: Current therapy includes Mounjaro 7.5mg once weekly. Now has lost about 20-22lbs. Mentions that she feels 7.5mg is a good dose to be at as she experienced some increase in side effects recently when transitioning to the 7.5mg dose, additionally she has an upcoming trip that she plans to take and does not want to increase dose before then. Patient questions if she should be getting more routine labs done as she has seen information on the Internet suggesting Mounjaro is harmful to body including kidneys, liver, and pancreas.     Depression: Current therapy includes sertraline 100mg daily. Feels that this time of year has been hard on mood but overall mood has been controlled. Sunnier days are better on mood, mentions she does have a therapy light but doesn't use it often.       Hyperlipidemia: Current therapy includes atorvastatin 20mg daily.  Patient reports no significant myalgias or other side effects.  Recent Labs   Lab Test 03/10/22  1603 04/21/21  0000 07/08/16  0908 09/21/15  0829   CHOL 204* 171   < > 211*   HDL 61 56   < > 46*   * 94   < > 140*   TRIG 133 112   < > 124   CHOLHDLRATIO  --   --   --  4.6    < > = values in this interval not displayed.     GERD: Current medications include: Protonix (pantoprazole) 40mg once daily. Patient reports no current symptoms.  Patient feels that current regimen is effective. If she forgets a day of pantoprazole dose she can feel it because her symptoms will increase.     Constipation: Current therapy includes Miralax 17g daily. Notices that she has to take Miralax daily in order for bowels to be more formed. Usually she will have bowel movement daily but often will be pebble like if she does not take Miralax.     Supplements: Current therapy includes vitamin b12 1 tablet daily, vitamin b12 1 tablet daily, Flinstones complete multivitamin 1 tablet daily, and vitamin  D 2,000 units daily.   Vitamin D Deficiency Screening Results:  Lab Results   Component Value Date    VITDT 31 10/06/2022    VITDT 36 09/21/2015    VITDT 25 (L) 10/16/2013    VITDT 23 (L) 03/28/2013     Today's Vitals: LMP 06/20/2015   ----------------  I spent 25 minutes with this patient today. All changes were made via verbal approval with Dr. Soria. A copy of the visit note was provided to the patient's provider(s).    A summary of these recommendations was sent via [employer].    Ms. Mitchell was seen independently by Dr. Ta. I have reviewed and agree with the resident note and plan of care.  Shira Keane, PharmD      Radha Ta, PharmD  Medication Therapy Management Resident  [phone]    Telemedicine Visit Details  Type of service:  Telephone visit  Start Time: 2:31 PM  End Time: 2:56 PM     Medication Therapy Recommendations  No medication therapy recommendations to display

## 2023-02-15 NOTE — PATIENT INSTRUCTIONS
"Recommendations from today's MTM visit:                                                      1. Continue Mounjaro 7.5mg weekly  2. See attached instructions regarding light therapy.     Follow-up: appointment scheduled for 4/20/2023.   PCP: 4/10/2023.     It was great speaking with you today.  I value your experience and would be very thankful for your time in providing feedback in our clinic survey. In the next few days, you may receive an email or text message from [employer] with a link to a survey related to your  clinical pharmacist.\"     To schedule another MTM appointment, please call the clinic directly or you may call the MTM scheduling line at [phone] or toll-free at [phone].     My Clinical Pharmacist's contact information:                                                      Please feel free to contact me with any questions or concerns you have.      Radha Ta, PharmD  Medication Therapy Management Resident  [phone]        Starting light therapy  Generally, most people with seasonal affective disorder begin treatment with light therapy in the early fall, when it typically becomes cloudy in many regions of the country. Treatment usually continues until spring, when outdoor light alone is sufficient to sustain a good mood and higher levels of energy.    If you typically have fall and winter depression, you may notice symptoms during prolonged periods of cloudy or rainy weather during other seasons. You and your doctor can adjust your light treatment based on the timing and duration of your symptoms.    If you want to try light therapy for nonseasonal depression or another condition, talk to your doctor about how light therapy can be most effective.    During light therapy  During light therapy sessions, you sit or work near a light box. To be effective, light from the light box must enter your eyes indirectly. You can't get the same effect merely by exposing your skin to the " light.    While your eyes must be open, don't look directly at the light box, because the bright light can damage your eyes. Be sure to follow your doctor's recommendations and the 's directions.    Light therapy requires time and consistency. You can set your light box on a table or desk in your home or office. That way you can read, use a computer, write, watch TV, talk on the phone or eat while having light therapy. Stick to your therapy schedule and don't overdo it.    Three key elements for effectiveness  Light therapy is most effective when you have the proper combination of light intensity, duration and timing.    Intensity. The intensity of the light box is recorded in lux, which is a measure of the amount of light you receive. For SAD, the typical recommendation is to use a 10,000-lux light box at a distance of about 16 to 24 inches (41 to 61 centimeters) from your face.    Duration. With a 10,000-lux light box, light therapy typically involves daily sessions of about 20 to 30 minutes. But a lower-intensity light box, such as 2,500 lux, may require longer sessions. Check the 's guidelines and follow your doctor's instructions. He or she may suggest you start with shorter sessions and gradually increase the time.    Timing. For most people, light therapy is most effective when it's done early in the morning, after you first wake up. Your doctor can help you determine the light therapy schedule that works best.    Results  Light therapy probably won't cure seasonal affective disorder, nonseasonal depression or other conditions. But it may ease symptoms, increase your energy levels, and help you feel better about yourself and life.    Light therapy can start to improve symptoms within just a few days. In some cases, though, it can take two or more weeks.    Getting the most out of light therapy  Light therapy isn't effective for everyone. But you can take steps to get the most out of  your light therapy and help make it a success.    Get the right light box. Do some research and talk to your doctor before buying a light therapy box. That way you can be sure your light box is safe, the proper brightness, the right kind of light, and that its style and features make it convenient to use.    Be consistent. Stick to a daily routine of light therapy sessions to help ensure that you maintain improvements over time. If you simply can't do light therapy every day, take a day or two off, but monitor your mood and other symptoms -- you may have to find a way to fit in light therapy every day.    Track the timing. If you interrupt light therapy during the winter months or stop too soon in the spring when you're improving, your symptoms could return. Keep track of when you start light box therapy in the fall and when you stop in the spring so you know when to start and end your light therapy the following year.    Include other treatment. If your symptoms don't improve enough with light therapy, you may need additional treatment. Talk to your doctor about other treatment options, such as antidepressants or psychotherapy.

## 2023-03-29 ENCOUNTER — MYC MEDICAL ADVICE (OUTPATIENT)
Dept: FAMILY MEDICINE | Facility: CLINIC | Age: 58
End: 2023-03-29
Payer: COMMERCIAL

## 2023-03-29 DIAGNOSIS — Z90.3 H/O GASTRIC SLEEVE: Primary | ICD-10-CM

## 2023-03-29 NOTE — TELEPHONE ENCOUNTER
RN notes only Prolactin and Vitamin B12 labs ordered.    Routing to provider to review and advise.    Britney Rodriguez RN  [facility]

## 2023-04-02 DIAGNOSIS — E78.5 HYPERLIPIDEMIA LDL GOAL <100: ICD-10-CM

## 2023-04-03 RX ORDER — ATORVASTATIN CALCIUM 20 MG/1
TABLET, FILM COATED ORAL
Qty: 90 TABLET | Refills: 0 | Status: SHIPPED | OUTPATIENT
Start: 2023-04-03 | End: 2023-10-11

## 2023-04-06 ENCOUNTER — MYC MEDICAL ADVICE (OUTPATIENT)
Dept: FAMILY MEDICINE | Facility: CLINIC | Age: 58
End: 2023-04-06
Payer: COMMERCIAL

## 2023-04-06 DIAGNOSIS — Z13.1 SCREENING FOR DIABETES MELLITUS: Primary | ICD-10-CM

## 2023-04-06 DIAGNOSIS — Z13.220 SCREENING FOR HYPERLIPIDEMIA: ICD-10-CM

## 2023-04-06 ASSESSMENT — ENCOUNTER SYMPTOMS
CONSTIPATION: 1
BREAST MASS: 0
DIZZINESS: 1

## 2023-04-07 ENCOUNTER — LAB (OUTPATIENT)
Dept: LAB | Facility: CLINIC | Age: 58
End: 2023-04-07
Payer: COMMERCIAL

## 2023-04-07 DIAGNOSIS — Z13.1 SCREENING FOR DIABETES MELLITUS: ICD-10-CM

## 2023-04-07 DIAGNOSIS — D64.9 ANEMIA, UNSPECIFIED TYPE: ICD-10-CM

## 2023-04-07 DIAGNOSIS — Z90.3 H/O GASTRIC SLEEVE: ICD-10-CM

## 2023-04-07 DIAGNOSIS — Z13.220 SCREENING FOR HYPERLIPIDEMIA: ICD-10-CM

## 2023-04-07 DIAGNOSIS — E11.9 TYPE 2 DIABETES MELLITUS WITHOUT COMPLICATION, WITHOUT LONG-TERM CURRENT USE OF INSULIN (H): ICD-10-CM

## 2023-04-07 DIAGNOSIS — D49.7 PITUITARY TUMOR: ICD-10-CM

## 2023-04-07 LAB
ALBUMIN SERPL-MCNC: 3.6 G/DL (ref 3.4–5)
ALP SERPL-CCNC: 89 U/L (ref 40–150)
ALT SERPL W P-5'-P-CCNC: 28 U/L (ref 0–50)
ANION GAP SERPL CALCULATED.3IONS-SCNC: 3 MMOL/L (ref 3–14)
AST SERPL W P-5'-P-CCNC: 15 U/L (ref 0–45)
BILIRUB SERPL-MCNC: 0.5 MG/DL (ref 0.2–1.3)
BUN SERPL-MCNC: 13 MG/DL (ref 7–30)
CALCIUM SERPL-MCNC: 8.9 MG/DL (ref 8.5–10.1)
CHLORIDE BLD-SCNC: 108 MMOL/L (ref 94–109)
CHOLEST SERPL-MCNC: 159 MG/DL
CO2 SERPL-SCNC: 31 MMOL/L (ref 20–32)
CREAT SERPL-MCNC: 0.76 MG/DL (ref 0.52–1.04)
FASTING STATUS PATIENT QL REPORTED: YES
GFR SERPL CREATININE-BSD FRML MDRD: 90 ML/MIN/1.73M2
GLUCOSE BLD-MCNC: 102 MG/DL (ref 70–99)
HBA1C MFR BLD: 5.7 % (ref 0–5.6)
HDLC SERPL-MCNC: 63 MG/DL
LDLC SERPL CALC-MCNC: 76 MG/DL
NONHDLC SERPL-MCNC: 96 MG/DL
POTASSIUM BLD-SCNC: 4 MMOL/L (ref 3.4–5.3)
PROLACTIN SERPL 3RD IS-MCNC: 10 NG/ML (ref 5–23)
PROT SERPL-MCNC: 7 G/DL (ref 6.8–8.8)
SODIUM SERPL-SCNC: 142 MMOL/L (ref 133–144)
TRIGL SERPL-MCNC: 98 MG/DL
VIT B12 SERPL-MCNC: 781 PG/ML (ref 232–1245)

## 2023-04-07 PROCEDURE — 82607 VITAMIN B-12: CPT

## 2023-04-07 PROCEDURE — 84146 ASSAY OF PROLACTIN: CPT

## 2023-04-07 PROCEDURE — 80053 COMPREHEN METABOLIC PANEL: CPT

## 2023-04-07 PROCEDURE — 80061 LIPID PANEL: CPT

## 2023-04-07 PROCEDURE — 83036 HEMOGLOBIN GLYCOSYLATED A1C: CPT

## 2023-04-07 PROCEDURE — 36415 COLL VENOUS BLD VENIPUNCTURE: CPT

## 2023-04-10 ENCOUNTER — OFFICE VISIT (OUTPATIENT)
Dept: FAMILY MEDICINE | Facility: CLINIC | Age: 58
End: 2023-04-10
Payer: COMMERCIAL

## 2023-04-10 VITALS
TEMPERATURE: 98.1 F | OXYGEN SATURATION: 98 % | WEIGHT: 278.5 LBS | DIASTOLIC BLOOD PRESSURE: 81 MMHG | HEART RATE: 74 BPM | RESPIRATION RATE: 16 BRPM | HEIGHT: 67 IN | SYSTOLIC BLOOD PRESSURE: 120 MMHG | BODY MASS INDEX: 43.71 KG/M2

## 2023-04-10 DIAGNOSIS — R42 VERTIGO: ICD-10-CM

## 2023-04-10 DIAGNOSIS — E66.01 MORBID OBESITY (H): ICD-10-CM

## 2023-04-10 DIAGNOSIS — L30.9 DERMATITIS: ICD-10-CM

## 2023-04-10 DIAGNOSIS — I83.91 VARICOSE VEINS OF RIGHT LOWER EXTREMITY, UNSPECIFIED WHETHER COMPLICATED: ICD-10-CM

## 2023-04-10 DIAGNOSIS — E11.9 TYPE 2 DIABETES MELLITUS WITHOUT COMPLICATION, WITHOUT LONG-TERM CURRENT USE OF INSULIN (H): ICD-10-CM

## 2023-04-10 DIAGNOSIS — K21.9 CHRONIC GERD: ICD-10-CM

## 2023-04-10 DIAGNOSIS — B37.2 INTERTRIGINOUS CANDIDIASIS: ICD-10-CM

## 2023-04-10 DIAGNOSIS — Z12.11 SCREEN FOR COLON CANCER: ICD-10-CM

## 2023-04-10 DIAGNOSIS — Z00.00 ROUTINE GENERAL MEDICAL EXAMINATION AT A HEALTH CARE FACILITY: Primary | ICD-10-CM

## 2023-04-10 DIAGNOSIS — F33.1 MODERATE EPISODE OF RECURRENT MAJOR DEPRESSIVE DISORDER (H): ICD-10-CM

## 2023-04-10 PROCEDURE — 90750 HZV VACC RECOMBINANT IM: CPT | Performed by: FAMILY MEDICINE

## 2023-04-10 PROCEDURE — 90471 IMMUNIZATION ADMIN: CPT | Performed by: FAMILY MEDICINE

## 2023-04-10 PROCEDURE — 99396 PREV VISIT EST AGE 40-64: CPT | Mod: 25 | Performed by: FAMILY MEDICINE

## 2023-04-10 PROCEDURE — 90746 HEPB VACCINE 3 DOSE ADULT IM: CPT | Performed by: FAMILY MEDICINE

## 2023-04-10 PROCEDURE — 90472 IMMUNIZATION ADMIN EACH ADD: CPT | Performed by: FAMILY MEDICINE

## 2023-04-10 PROCEDURE — 99213 OFFICE O/P EST LOW 20 MIN: CPT | Mod: 25 | Performed by: FAMILY MEDICINE

## 2023-04-10 RX ORDER — PANTOPRAZOLE SODIUM 20 MG/1
20 TABLET, DELAYED RELEASE ORAL DAILY
Qty: 90 TABLET | Refills: 1 | Status: SHIPPED | OUTPATIENT
Start: 2023-04-10 | End: 2024-02-06

## 2023-04-10 RX ORDER — TRIAMCINOLONE ACETONIDE 1 MG/G
CREAM TOPICAL 2 TIMES DAILY PRN
Qty: 80 G | Refills: 1 | Status: SHIPPED | OUTPATIENT
Start: 2023-04-10 | End: 2024-02-06

## 2023-04-10 RX ORDER — NYSTATIN 100000 [USP'U]/G
POWDER TOPICAL DAILY PRN
Qty: 60 G | Refills: 11 | Status: SHIPPED | OUTPATIENT
Start: 2023-04-10 | End: 2024-02-06

## 2023-04-10 ASSESSMENT — PATIENT HEALTH QUESTIONNAIRE - PHQ9
SUM OF ALL RESPONSES TO PHQ QUESTIONS 1-9: 0
SUM OF ALL RESPONSES TO PHQ QUESTIONS 1-9: 0
10. IF YOU CHECKED OFF ANY PROBLEMS, HOW DIFFICULT HAVE THESE PROBLEMS MADE IT FOR YOU TO DO YOUR WORK, TAKE CARE OF THINGS AT HOME, OR GET ALONG WITH OTHER PEOPLE: NOT DIFFICULT AT ALL

## 2023-04-10 ASSESSMENT — PAIN SCALES - GENERAL: PAINLEVEL: NO PAIN (0)

## 2023-04-10 ASSESSMENT — ENCOUNTER SYMPTOMS
CONSTIPATION: 1
DIZZINESS: 1
BREAST MASS: 0

## 2023-04-10 NOTE — NURSING NOTE
Prior to immunization administration, verified patients identity using patient s name and date of birth. Please see Immunization Activity for additional information.     Screening Questionnaire for Adult Immunization    Are you sick today?   No   Do you have allergies to medications, food, a vaccine component or latex?   No   Have you ever had a serious reaction after receiving a vaccination?   No   Do you have a long-term health problem with heart, lung, kidney, or metabolic disease (e.g., diabetes), asthma, a blood disorder, no spleen, complement component deficiency, a cochlear implant, or a spinal fluid leak?  Are you on long-term aspirin therapy?   No   Do you have cancer, leukemia, HIV/AIDS, or any other immune system problem?   No   Do you have a parent, brother, or sister with an immune system problem?   No   In the past 3 months, have you taken medications that affect  your immune system, such as prednisone, other steroids, or anticancer drugs; drugs for the treatment of rheumatoid arthritis, Crohn s disease, or psoriasis; or have you had radiation treatments?   No   Have you had a seizure, or a brain or other nervous system problem?   No   During the past year, have you received a transfusion of blood or blood    products, or been given immune (gamma) globulin or antiviral drug?   No   For women: Are you pregnant or is there a chance you could become       pregnant during the next month?   No   Have you received any vaccinations in the past 4 weeks?   No     Immunization questionnaire answers were all negative.      Injection of HEP B and Shingrix given by Maricruz Anderson MA. Patient instructed to remain in clinic for 15 minutes afterwards, and to report any adverse reactions.     Screening performed by Maricruz Anderson MA on 4/10/2023 at 4:28 PM.

## 2023-04-10 NOTE — PATIENT INSTRUCTIONS
For shower- dove unscented/sensitive skin  For Moisturizers: CereVe, VaniCream daily   - triamcinalone cream 1-2 x's daily, light layer for 1-2 weeks at a time for flares..     Increase fiber to two capsules twice daily  Push water  Miralax as needed to keep stools soft and easy to pass    Start back with compression stockings daily  Warm heat to painful area of leg     Schedule colonoscopy for this summer (August)    Schedule with gynecology/mammogram      Preventive Health Recommendations  Female Ages 50 - 64    Yearly exam: See your health care provider every year in order to  Review health changes.   Discuss preventive care.    Review your medicines if your doctor has prescribed any.    Get a Pap test every three years (unless you have an abnormal result and your provider advises testing more often).  If you get Pap tests with HPV test, you only need to test every 5 years, unless you have an abnormal result.   You do not need a Pap test if your uterus was removed (hysterectomy) and you have not had cancer.  You should be tested each year for STDs (sexually transmitted diseases) if you're at risk.   Have a mammogram every 1 to 2 years.  Have a colonoscopy at age 50, or have a yearly FIT test (stool test). These exams screen for colon cancer.    Have a cholesterol test every 5 years, or more often if advised.  Have a diabetes test (fasting glucose) every three years. If you are at risk for diabetes, you should have this test more often.   If you are at risk for osteoporosis (brittle bone disease), think about having a bone density scan (DEXA).    Shots: Get a flu shot each year. Get a tetanus shot every 10 years.    Nutrition:   Eat at least 5 servings of fruits and vegetables each day.  Eat whole-grain bread, whole-wheat pasta and brown rice instead of white grains and rice.  Get adequate Calcium and Vitamin D.     Lifestyle  Exercise at least 150 minutes a week (30 minutes a day, 5 days a week). This will help  you control your weight and prevent disease.  Limit alcohol to one drink per day.  No smoking.   Wear sunscreen to prevent skin cancer.   See your dentist every six months for an exam and cleaning.  See your eye doctor every 1 to 2 years.

## 2023-04-10 NOTE — PROGRESS NOTES
SUBJECTIVE:   CC: Satnam is an 57 year old who presents for preventive health visit.        View : No data to display.            Patient has been advised of split billing requirements and indicates understanding: Yes  Healthy Habits:     Getting at least 3 servings of Calcium per day:  Yes    Bi-annual eye exam:  Yes    Dental care twice a year:  Yes    Sleep apnea or symptoms of sleep apnea:  None    Diet:  Carbohydrate counting    Frequency of exercise:  2-3 days/week    Duration of exercise:  15-30 minutes    Taking medications regularly:  Yes    PHQ-2 Total Score: 0    Additional concerns today:  Yes    On Mounjaro now for 6 months  Continues to drop weight 20 lbs. On current dose, 7.5 mg  for the last few weeks. Working with pharmD  2.5 and 5 mg didn't help as much with appetite suppression. Starting to loose better on the higher dose.   Using protein drinks to ensure she gets enough protein.     Warm to touch and pain in right lateral posterior calf. No significant swelling. Pain seems only to light touch/superfiscially and does not have pain that feels deeper     Red spots on distarl forearms and distal legs. Present for years. no Pruritis  Dove or Olay in shower.   Bath and body works for lotions.     gerd well controlled. Has been on the 40 mg dose of protonix since her gastric bypass    Constipation from mounjaro. Added magnesium  Metamucil 2 capsules  Not currently using miralax    Gynecology: [facility], gets pelvic/pap there.       Today's PHQ-2 Score:       4/6/2023     8:43 AM   PHQ-2 ( 1999 Pf[employer])   Q1: Little interest or pleasure in doing things 0   Q2: Feeling down, depressed or hopeless 0   PHQ-2 Score 0   Q1: Little interest or pleasure in doing things Not at all   Q2: Feeling down, depressed or hopeless Not at all   PHQ-2 Score 0           Social History     Tobacco Use     Smoking status: Never     Smokeless tobacco: Never   Vaping Use     Vaping status: Never Used   Substance Use  "Topics     Alcohol use: Yes     Alcohol/week: 0.0 standard drinks of alcohol     Comment: 1 drink per week             4/6/2023     8:43 AM   Alcohol Use   Prescreen: >3 drinks/day or >7 drinks/week? No     Reviewed orders with patient.  Reviewed health maintenance and updated orders accordingly - Yes      Breast Cancer Screening:    FHS-7:       2/15/2022     3:17 PM 3/10/2022     9:37 AM 4/6/2023     8:44 AM   Breast CA Risk Assessment (FHS-7)   Did any of your first-degree relatives have breast or ovarian cancer? No No No   Did any of your relatives have bilateral breast cancer? No No No   Did any man in your family have breast cancer? No No No   Did any woman in your family have breast and ovarian cancer? No No No   Did any woman in your family have breast cancer before age 50 y? No No No   Do you have 2 or more relatives with breast and/or ovarian cancer? No No No   Do you have 2 or more relatives with breast and/or bowel cancer? No  Yes   above questionairre is incorrent: should have been \"no\" for the 2 or more relatives with breast and /or bowel cancer.     Mammogram Screening: Recommended annual mammography  Pertinent mammograms are reviewed under the imaging tab.    History of abnormal Pap smear: NO - age 30-65 PAP every 5 years with negative HPV co-testing recommended     Reviewed and updated as needed this visit by clinical staff                  Reviewed and updated as needed this visit by Provider                     Review of Systems   Gastrointestinal: Positive for constipation.   Breasts:  Negative for tenderness, breast mass and discharge.   Genitourinary: Negative for pelvic pain, vaginal bleeding and vaginal discharge.   Neurological: Positive for dizziness.     Vertigo- had recent episode  If gets up too quickly in the AM will trigger  Spinning   Laying down and resolved.     Hasn't been to derm in few years.        OBJECTIVE:   /81 (BP Location: Right arm, Patient Position: Sitting, Cuff " "Size: Adult Large)   Pulse 74   Temp 98.1  F (36.7  C) (Oral)   Resp 16   Ht 1.702 m (5' 7\")   Wt 126.3 kg (278 lb 8 oz)   LMP 06/06/2015   SpO2 98%   BMI 43.62 kg/m    Physical Exam  GENERAL: healthy, alert and no distress  EYES: Eyes grossly normal to inspection, PERRL and conjunctivae and sclerae normal  HENT: ear canals and TM's normal, nose and mouth without ulcers or lesions  NECK: no adenopathy, no asymmetry, masses, or scars and thyroid normal to palpation  RESP: lungs clear to auscultation - no rales, rhonchi or wheezes  BREAST: normal without masses, tenderness or nipple discharge and no palpable axillary masses or adenopathy  CV: regular rate and rhythm, normal S1 S2, no S3 or S4, no murmur, click or rub, no peripheral edema and peripheral pulses strong  ABDOMEN: soft, nontender, no hepatosplenomegaly, no masses and bowel sounds normal  MS: no gross musculoskeletal defects noted, no edema. Large varicosity on posterior lateral right lower leg that is tender to light touch. No pain with deeper palpation of the calf. No significant swelling appreciated.   SKIN: no suspicious lesions.  Small, scaling, erythematous plaques distal extremities extensor surfaces.  NEURO: Normal strength and tone, mentation intact and speech normal  PSYCH: mentation appears normal, affect normal/bright    Diagnostic Test Results:  Labs reviewed in Epic  Recent Results (from the past 720 hour(s))   Vitamin B12    Collection Time: 04/07/23  8:49 AM   Result Value Ref Range    Vitamin B12 781 232 - 1,245 pg/mL   Prolactin    Collection Time: 04/07/23  8:49 AM   Result Value Ref Range    Prolactin 10 5 - 23 ng/mL   Hemoglobin A1c    Collection Time: 04/07/23  8:49 AM   Result Value Ref Range    Hemoglobin A1C 5.7 (H) 0.0 - 5.6 %   Lipid panel reflex to direct LDL Fasting    Collection Time: 04/07/23  8:49 AM   Result Value Ref Range    Cholesterol 159 <200 mg/dL    Triglycerides 98 <150 mg/dL    Direct Measure HDL 63 >=50 " mg/dL    LDL Cholesterol Calculated 76 <=100 mg/dL    Non HDL Cholesterol 96 <130 mg/dL    Patient Fasting > 8hrs? Yes    COMPREHENSIVE METABOLIC PANEL    Collection Time: 04/07/23  8:49 AM   Result Value Ref Range    Sodium 142 133 - 144 mmol/L    Potassium 4.0 3.4 - 5.3 mmol/L    Chloride 108 94 - 109 mmol/L    Carbon Dioxide (CO2) 31 20 - 32 mmol/L    Anion Gap 3 3 - 14 mmol/L    Urea Nitrogen 13 7 - 30 mg/dL    Creatinine 0.76 0.52 - 1.04 mg/dL    Calcium 8.9 8.5 - 10.1 mg/dL    Glucose 102 (H) 70 - 99 mg/dL    Alkaline Phosphatase 89 40 - 150 U/L    AST 15 0 - 45 U/L    ALT 28 0 - 50 U/L    Protein Total 7.0 6.8 - 8.8 g/dL    Albumin 3.6 3.4 - 5.0 g/dL    Bilirubin Total 0.5 0.2 - 1.3 mg/dL    GFR Estimate 90 >60 mL/min/1.73m2         ASSESSMENT/PLAN:   (Z00.00) Routine general medical examination at a health care facility  (primary encounter diagnosis)    (E11.9) Type 2 diabetes mellitus without complication, without long-term current use of insulin (H)  Comment: Under excellent control  Plan: Continue Mounjaro, aspirin, atorvastatin    (E66.01) Morbid obesity (H)  Comment: Actively losing weight on Mounjaro.  Working with Pharm.D. to titrate doses  Plan: Continue Mounjaro and titrate up to next level as ready.    (K21.9) Chronic GERD  Comment: Has been on chronic PPI.  Symptoms are well controlled   Plan: pantoprazole (PROTONIX) 20 MG EC tablet        Discussed slow weaning of Protonix from 40 mg down to 20 mg recommended if able.    (R42) Vertigo  Comment: A few episodes of vertigo triggered by head position.  They have been self-limited  Plan: Certainly could refer for physical therapy if persistent.  Discussed briefly the Epley maneuver.    (I83.91) Varicose veins of right lower extremity, unspecified whether complicated  Comment: She has a large varicosity that is quite tender to very light touch in her right lower leg.  My suspicion for deep clot is very low given no swelling and no pain on deep  "palpation.  I suspect she could have superficial thrombophlebitis versus phlebitis versus just pain from varicosity.  We discussed use of compression stocking, warm heat and continue daily aspirin.  We will get ultrasound to evaluate for more details.  Given her symptoms are only superficial at this point okay to not complete ultrasound urgently but if any symptoms progress we did discuss that this needs to be looked at right away.  She agreed and would proceed to the ER if there was progression of symptoms.  Plan: US Lower Extremity Venous Duplex Right          (B37.2) Intertriginous candidiasis  Comment: Mild irritation under her breasts  Plan: nystatin (MYCOSTATIN) 751320 UNIT/GM external         powder        Discussed intermittent use of nystatin powder as needed for prevention    (Z12.11) Screen for colon cancer  Comment: Due for 5-year colonoscopy follow-up  Plan: Colonoscopy Screening  Referral        She would like to continue to complete with [facility].  Referral given.    (F33.1) Moderate episode of recurrent major depressive disorder (H)  Comment: Controlled on sertraline  Plan: Continue current medication    Rash: distal extremeties, asymptomatic, present for many years.   Dermatitis, likely eczema but irritant also possible.   Reviewed recommended skin care moisturizers, soaps and use of topical steroid as needed        COUNSELING:  Reviewed preventive health counseling, as reflected in patient instructions       Healthy diet/nutrition       Vision screening       Aspirin prophylaxis       Colorectal Cancer Screening      BMI:   Estimated body mass index is 45.22 kg/m  as calculated from the following:    Height as of 10/6/22: 1.708 m (5' 7.25\").    Weight as of 10/6/22: 132 kg (290 lb 14.4 oz).   Weight management plan: continue mounjaro, healthy diet      She reports that she has never smoked. She has never used smokeless tobacco.     Patient instructions:   For shower- dove " unscented/sensitive skin  For Moisturizers: CereVe, VaniCream daily   - triamcinalone cream 1-2 x's daily, light layer for 1-2 weeks at a time for flares..     Increase fiber to two capsules twice daily  Push water  Miralax as needed to keep stools soft and easy to pass    Start back with compression stockings daily  Warm heat to painful area of leg     Schedule colonoscopy for this summer (August)    Schedule with gynecology/mammogram          Cecile Soria MD  [facility]  Answers for HPI/ROS submitted by the patient on 4/10/2023  If you checked off any problems, how difficult have these problems made it for you to do your work, take care of things at home, or get along with other people?: Not difficult at all  PHQ9 TOTAL SCORE: 0

## 2023-04-20 ENCOUNTER — VIRTUAL VISIT (OUTPATIENT)
Dept: PHARMACY | Facility: CLINIC | Age: 58
End: 2023-04-20
Payer: COMMERCIAL

## 2023-04-20 DIAGNOSIS — E66.01 MORBID OBESITY (H): ICD-10-CM

## 2023-04-20 DIAGNOSIS — E11.9 TYPE 2 DIABETES MELLITUS WITHOUT COMPLICATION, WITHOUT LONG-TERM CURRENT USE OF INSULIN (H): Primary | ICD-10-CM

## 2023-04-20 PROCEDURE — 99606 MTMS BY PHARM EST 15 MIN: CPT | Performed by: PHARMACIST

## 2023-04-20 RX ORDER — TIRZEPATIDE 10 MG/.5ML
10 INJECTION, SOLUTION SUBCUTANEOUS
Qty: 6 ML | Refills: 0 | Status: SHIPPED | OUTPATIENT
Start: 2023-04-20 | End: 2023-06-22

## 2023-04-20 NOTE — PATIENT INSTRUCTIONS
"Recommendations from today's MTM visit:                                                    MTM (medication therapy management) is a service provided by a clinical pharmacist designed to help you get the most of out of your medicines.   Today we reviewed what your medicines are for, how to know if they are working, that your medicines are safe and how to make your medicine regimen as easy as possible.      Finish your last Mounjaro 7.5mg dose and then increase to 10mg weekly.     Follow-up: Return in about 9 weeks (around 6/22/2023) for Follow-up Medication Review.    It was great speaking with you today.  I value your experience and would be very thankful for your time in providing feedback in our clinic survey. In the next few days, you may receive an email or text message from [employer] with a link to a survey related to your  clinical pharmacist.\"     To schedule another MTM appointment, please call the clinic directly or you may call the MTM scheduling line at [phone] or toll-free at [phone].     My Clinical Pharmacist's contact information:                                                      Please feel free to contact me with any questions or concerns you have.      Selena Goldstein, Fatoumata, BCACP  Medication Therapy Management Provider  Pager: [phone]    "

## 2023-04-20 NOTE — PROGRESS NOTES
Medication Therapy Management (MTM) Encounter    ASSESSMENT:                            Medication Adherence/Access: No issues identified    Type 2 Diabetes/Obesity: Stable. Patient is meeting A1c goal of < 7%, continuous glucose monitor data is limited but at goal <70% in target range for the data we have.  She'd like to escalate Mounjaro dose to aid with further weight loss, I agree this is a good next step.    PLAN:                            Increased Mounjaro to 10mg weekly - will plan to use this dose for the next 3 months.    Follow-up: Return in about 9 weeks (around 6/22/2023) for Follow-up Medication Review.    SUBJECTIVE/OBJECTIVE:                          Juliann Mitchell is a 57 year old female called for a follow-up visit.  Today's visit is a follow-up MTM visit from 2/15/2023.     Reason for visit: Follow-up on Mounjaro.    Tobacco: She reports that she has never smoked. She has never used smokeless tobacco.  Alcohol: occasionally    Medication Adherence/Access: no issues reported    Type 2 Diabetes/Obesity:  Currently taking Mounjaro 7.5mg once weekly.  She does ave constipation associated with Mounjaro, but otherwise denies side effects.  She's pleased with reduced appetite, weight loss and blood sugar control.  Blood sugar monitoring: Continuous Glucose Monitor.     Symptoms of low blood sugar? None recently  Symptoms of high blood sugar? None  Eye exam: up to date  Foot exam: up to date  Diet/Exercise: Patient has been having an increase in appetite suppression with increasing Mounjaro dose, has also been enjoying walking and finds this easier with the weight loss she's had.  She'd like to continue increasing Mounjaro as tolerated  Aspirin: Taking 81mg daily for primary prevention   Statin: Yes: atorvastatin 20mg daily   ACEi/ARB: No.   Urine Albumin:   Lab Results   Component Value Date    UMALCR  03/10/2022      Comment:      Unable to calculate:  Urine creatinine or albumin value below detectable  level      Lab Results   Component Value Date    A1C 5.7 04/07/2023    A1C 5.7 10/06/2022    A1C 6.1 06/29/2022    A1C 6.6 03/10/2022    A1C 6.1 04/21/2021    A1C 6.1 06/11/2020    A1C 5.9 02/26/2020    A1C 5.9 07/18/2019    A1C 6.3 06/01/2017     Wt Readings from Last 4 Encounters:   04/10/23 278 lb 8 oz (126.3 kg)   10/06/22 290 lb 14.4 oz (132 kg)   04/04/22 299 lb 12.8 oz (136 kg)   03/10/22 287 lb (130.2 kg)      Today's Vitals: LMP 06/06/2015   ----------------    I spent 12 minutes with this patient today. All changes were made via collaborative practice agreement with Dr. Soria. A copy of the visit note was provided to the patient's provider(s).    A summary of these recommendations was sent via [employer].    Selena Goldstein, PharmD, BCACP  Medication Therapy Management Provider  Pager: [phone]     Telemedicine Visit Details  Type of service:  Telephone visit  Start Time: 3:03 PM  End Time: 3:15 PM     Medication Therapy Recommendations  Type 2 diabetes mellitus without complication, without long-term current use of insulin (H)    Current Medication: tirzepatide (MOUNJARO) 7.5 MG/0.5ML pen (Discontinued)   Rationale: Dose too low - Dosage too low - Effectiveness   Recommendation: Increase Dose - Mounjaro 10 MG/0.5ML Sopn   Status: Accepted per CPA

## 2023-06-01 ENCOUNTER — TRANSFERRED RECORDS (OUTPATIENT)
Dept: MULTI SPECIALTY CLINIC | Facility: CLINIC | Age: 58
End: 2023-06-01

## 2023-06-01 LAB — RETINOPATHY: NORMAL

## 2023-06-13 ENCOUNTER — ANCILLARY PROCEDURE (OUTPATIENT)
Dept: MAMMOGRAPHY | Facility: CLINIC | Age: 58
End: 2023-06-13
Attending: FAMILY MEDICINE
Payer: COMMERCIAL

## 2023-06-13 DIAGNOSIS — Z12.31 VISIT FOR SCREENING MAMMOGRAM: ICD-10-CM

## 2023-06-13 PROCEDURE — 77063 BREAST TOMOSYNTHESIS BI: CPT | Mod: TC | Performed by: RADIOLOGY

## 2023-06-13 PROCEDURE — 77067 SCR MAMMO BI INCL CAD: CPT | Mod: TC | Performed by: RADIOLOGY

## 2023-06-22 ENCOUNTER — VIRTUAL VISIT (OUTPATIENT)
Dept: PHARMACY | Facility: CLINIC | Age: 58
End: 2023-06-22
Payer: COMMERCIAL

## 2023-06-22 DIAGNOSIS — E66.01 MORBID OBESITY (H): ICD-10-CM

## 2023-06-22 DIAGNOSIS — E11.9 TYPE 2 DIABETES MELLITUS WITHOUT COMPLICATION, WITHOUT LONG-TERM CURRENT USE OF INSULIN (H): Primary | ICD-10-CM

## 2023-06-22 PROCEDURE — 99606 MTMS BY PHARM EST 15 MIN: CPT | Performed by: PHARMACIST

## 2023-06-22 RX ORDER — TIRZEPATIDE 10 MG/.5ML
10 INJECTION, SOLUTION SUBCUTANEOUS
Qty: 6 ML | Refills: 0 | Status: SHIPPED | OUTPATIENT
Start: 2023-06-22 | End: 2023-08-14

## 2023-06-22 NOTE — PROGRESS NOTES
Medication Therapy Management (MTM) Encounter    ASSESSMENT:                            Medication Adherence/Access: No issues identified    Type 2 Diabetes/Obesity: Stable. Patient is meeting A1c goal of < 7%. Self monitoring of blood glucose is at goal of > 70% time in target with continuous glucose monitoring (although we are missing some data).  She's doing great with weight loss, diet/exercise changes, will benefit from continuing current regimen.    PLAN:                            Continue current medication regimen    Follow-up: Return in about 3 months (around 9/22/2023) for Follow-up Medication Review.    SUBJECTIVE/OBJECTIVE:                          Satnam Mitchell is a 57 year old female called for a follow-up visit.  Today's visit is a follow-up MTM visit from 4/20/23.     Reason for visit: Diabetes/weight loss follow-up.    Tobacco: She reports that she has never smoked. She has never used smokeless tobacco.  Alcohol: occasionally    Medication Adherence/Access: no issues reported    Type 2 Diabetes/Obesity:    Mounjaro 10mg  Patient is not experiencing side effects.  Aspirin: Taking 81mg daily for primary prevention and denies side effects   Blood sugar monitoring: Continuous Glucose Monitor. Ranges (per continuous glucose monitor):     Current diabetes symptoms: none  Diet/Exercise: She continues to feel that Mounjaro has been helpful in suppressing her appetite, helping her to make better choices - she'd like to stay at this dose and not escalate further at this time  Eye exam: due  Foot exam: up to date  Urine Albumin:   Lab Results   Component Value Date    UMALCR  03/10/2022      Comment:      Unable to calculate:  Urine creatinine or albumin value below detectable level      Lab Results   Component Value Date    A1C 5.7 (H) 04/07/2023   ]     She's down a total of 33 pounds per her report  Wt Readings from Last 4 Encounters:   04/10/23 278 lb 8 oz (126.3 kg)   10/06/22 290 lb 14.4 oz (132 kg)    04/04/22 299 lb 12.8 oz (136 kg)   03/10/22 287 lb (130.2 kg)      Today's Vitals: LMP 06/06/2015   ----------------    I spent 9 minutes with this patient today. All changes were made via collaborative practice agreement with Dr. Soria. A copy of the visit note was provided to the patient's provider(s).    A summary of these recommendations was sent via [employer].    Selena Goldstein PharmD, [facility]  Medication Therapy Management Provider  Pager: [phone]     Telemedicine Visit Details  Type of service:  Telephone visit  Start Time: 10:04 AM  End Time: 10:13 AM     Medication Therapy Recommendations  No medication therapy recommendations to display

## 2023-06-24 NOTE — PATIENT INSTRUCTIONS
"Recommendations from today's MTM visit:                                                    MTM (medication therapy management) is a service provided by a clinical pharmacist designed to help you get the most of out of your medicines.   Today we reviewed what your medicines are for, how to know if they are working, that your medicines are safe and how to make your medicine regimen as easy as possible.      Continue current medication regimen    Follow-up: Return in about 3 months (around 9/22/2023) for Follow-up Medication Review.    It was great speaking with you today.  I value your experience and would be very thankful for your time in providing feedback in our clinic survey. In the next few days, you may receive an email or text message from [employer] with a link to a survey related to your  clinical pharmacist.\"     To schedule another MTM appointment, please call the clinic directly or you may call the MTM scheduling line at [phone] or toll-free at [phone].     My Clinical Pharmacist's contact information:                                                      Please feel free to contact me with any questions or concerns you have.      Selena Goldstein, Fatoumata, [facility]  Medication Therapy Management Provider  Pager: [phone]    "

## 2023-08-14 ENCOUNTER — MYC REFILL (OUTPATIENT)
Dept: PHARMACY | Facility: CLINIC | Age: 58
End: 2023-08-14
Payer: COMMERCIAL

## 2023-08-14 DIAGNOSIS — E66.01 MORBID OBESITY (H): ICD-10-CM

## 2023-08-14 DIAGNOSIS — E11.9 TYPE 2 DIABETES MELLITUS WITHOUT COMPLICATION, WITHOUT LONG-TERM CURRENT USE OF INSULIN (H): ICD-10-CM

## 2023-08-14 RX ORDER — TIRZEPATIDE 10 MG/.5ML
10 INJECTION, SOLUTION SUBCUTANEOUS
Qty: 6 ML | Refills: 0 | OUTPATIENT
Start: 2023-08-14

## 2023-08-22 ENCOUNTER — TRANSFERRED RECORDS (OUTPATIENT)
Dept: HEALTH INFORMATION MANAGEMENT | Facility: CLINIC | Age: 58
End: 2023-08-22
Payer: COMMERCIAL

## 2023-08-26 DIAGNOSIS — K21.9 CHRONIC GERD: ICD-10-CM

## 2023-08-28 RX ORDER — PANTOPRAZOLE SODIUM 40 MG/1
TABLET, DELAYED RELEASE ORAL
Qty: 90 TABLET | Refills: 1 | Status: SHIPPED | OUTPATIENT
Start: 2023-08-28 | End: 2024-04-02

## 2023-08-30 DIAGNOSIS — F33.1 MODERATE EPISODE OF RECURRENT MAJOR DEPRESSIVE DISORDER (H): ICD-10-CM

## 2023-08-30 RX ORDER — SERTRALINE HYDROCHLORIDE 100 MG/1
TABLET, FILM COATED ORAL
Qty: 90 TABLET | Refills: 0 | Status: SHIPPED | OUTPATIENT
Start: 2023-08-30 | End: 2023-10-11

## 2023-09-21 ENCOUNTER — VIRTUAL VISIT (OUTPATIENT)
Dept: PHARMACY | Facility: CLINIC | Age: 58
End: 2023-09-21

## 2023-09-21 DIAGNOSIS — E11.9 TYPE 2 DIABETES MELLITUS WITHOUT COMPLICATION, WITHOUT LONG-TERM CURRENT USE OF INSULIN (H): Primary | ICD-10-CM

## 2023-09-21 DIAGNOSIS — K59.00 CONSTIPATION, UNSPECIFIED CONSTIPATION TYPE: ICD-10-CM

## 2023-09-21 DIAGNOSIS — E66.01 MORBID OBESITY (H): ICD-10-CM

## 2023-09-21 PROCEDURE — 99606 MTMS BY PHARM EST 15 MIN: CPT | Performed by: PHARMACIST

## 2023-09-21 RX ORDER — DOCUSATE SODIUM 100 MG/1
100 CAPSULE, LIQUID FILLED ORAL DAILY
COMMUNITY
End: 2024-02-06

## 2023-09-21 NOTE — PATIENT INSTRUCTIONS
"Recommendations from today's MTM visit:                                                    MTM (medication therapy management) is a service provided by a clinical pharmacist designed to help you get the most of out of your medicines.   Today we reviewed what your medicines are for, how to know if they are working, that your medicines are safe and how to make your medicine regimen as easy as possible.      You can start taking docusate in addition to magnesium products for your bowels.  You can purchase this over the counter - start with 100-200mg daily.    Follow-up: Return in about 20 days (around 10/11/2023) for Physical Exam, Lab Work with Dr. Soria.    It was great speaking with you today.  I value your experience and would be very thankful for your time in providing feedback in our clinic survey. In the next few days, you may receive an email or text message from [employer] with a link to a survey related to your  clinical pharmacist.\"     To schedule another MTM appointment, please call the clinic directly or you may call the MTM scheduling line at [phone] or toll-free at [phone].     My Clinical Pharmacist's contact information:                                                      Please feel free to contact me with any questions or concerns you have.      Selena Goldstein, Fatoumata, [facility]  Medication Therapy Management Provider  Pager: [phone]    "

## 2023-09-21 NOTE — PROGRESS NOTES
Medication Therapy Management (MTM) Encounter    ASSESSMENT:                            Medication Adherence/Access: No issues identified    Diabetes/Obesity:   Stable. Patient is meeting A1c goal of < 7%.  She continues making great progress with weight loss.  She has upcoming primary care physician visit with labs, will continue Mounjaro at 10mg dose for now, depending on labs she may want to increase to 12.5mg.    Constipation:    May benefit from adding docusate to bowel regimen.  She's taking high doses of magnesium, would not escalate further.    PLAN:                            You can start taking docusate in addition to magnesium products for your bowels.  You can purchase this over the counter - start with 100-200mg daily.    Follow-up: Return in about 20 days (around 10/11/2023) for Physical Exam, Lab Work with Dr. Soria.    SUBJECTIVE/OBJECTIVE:                          Satnam Mitchell is a 57 year old female called for a follow-up visit from 6/22/23.       Reason for visit: Routine follow-up.    Tobacco: She reports that she has never smoked. She has never used smokeless tobacco.  Alcohol: occasionally    Medication Adherence/Access: no issues reported    Diabetes   Type 2 Diabetes/Obesity:    Mounjaro 10mg  Aspirin 81mg daily  Patient is not experiencing side effects, she continues to experience some appetite suppression which she's pleased about.  Blood sugar monitoring: Never - she hasn't been using continuous glucose monitor recently  Current diabetes symptoms: none     Eye exam in the last 12 months? Yes- Date of last eye exam: 4/1/2023,  Location: [facility]  Foot exam: due  Urine Albumin:   Lab Results   Component Value Date    UMALCR  03/10/2022      Comment:      Unable to calculate:  Urine creatinine or albumin value below detectable level      Lab Results   Component Value Date    A1C 5.7 (H) 04/07/2023     Home weight - 255 lbs   Starting weight - 299 lbs   Total weight loss - 44 lbs (14.7%  of body weight)  Wt Readings from Last 4 Encounters:   04/10/23 278 lb 8 oz (126.3 kg)   10/06/22 290 lb 14.4 oz (132 kg)   04/04/22 299 lb 12.8 oz (136 kg)   03/10/22 287 lb (130.2 kg)      Constipation:  Magnesium citrate 400mg daily  Magnesium oxide 700-1050mg daily  She reports she continues to have intermittent challenges with constipation.  Was previously using Metamucil and Miralax - didn't like taking either of these.  No side effects reported.    Today's Vitals: LMP 06/06/2015   ----------------    I spent 15 minutes with this patient today.. A copy of the visit note was provided to the patient's provider(s).    A summary of these recommendations was sent via [employer].    Selena Goldstein, PharmD, BCACP  Medication Therapy Management Provider  Pager: [phone]     Telemedicine Visit Details  Type of service:  Telephone visit  Start Time: 3:06 PM  End Time: 3:21 PM       Medication Therapy Recommendations  Constipation, unspecified constipation type    Current Medication: Magnesium Citrate 200 MG TABS   Rationale: Synergistic therapy - Needs additional medication therapy - Indication   Recommendation: Start Medication - docusate sodium 100 MG capsule   Status: Accepted - no CPA Needed

## 2023-10-11 ENCOUNTER — OFFICE VISIT (OUTPATIENT)
Dept: FAMILY MEDICINE | Facility: CLINIC | Age: 58
End: 2023-10-11
Payer: COMMERCIAL

## 2023-10-11 VITALS
TEMPERATURE: 98.2 F | SYSTOLIC BLOOD PRESSURE: 115 MMHG | HEART RATE: 82 BPM | BODY MASS INDEX: 40.53 KG/M2 | HEIGHT: 67 IN | OXYGEN SATURATION: 97 % | WEIGHT: 258.2 LBS | DIASTOLIC BLOOD PRESSURE: 78 MMHG | RESPIRATION RATE: 14 BRPM

## 2023-10-11 DIAGNOSIS — E66.01 MORBID OBESITY (H): Primary | ICD-10-CM

## 2023-10-11 DIAGNOSIS — E11.9 TYPE 2 DIABETES MELLITUS WITHOUT COMPLICATION, WITHOUT LONG-TERM CURRENT USE OF INSULIN (H): ICD-10-CM

## 2023-10-11 DIAGNOSIS — D50.9 IRON DEFICIENCY ANEMIA, UNSPECIFIED IRON DEFICIENCY ANEMIA TYPE: ICD-10-CM

## 2023-10-11 DIAGNOSIS — Z23 NEED FOR PROPHYLACTIC VACCINATION AND INOCULATION AGAINST INFLUENZA: ICD-10-CM

## 2023-10-11 DIAGNOSIS — E78.5 HYPERLIPIDEMIA LDL GOAL <100: ICD-10-CM

## 2023-10-11 DIAGNOSIS — Z23 HIGH PRIORITY FOR 2019-NCOV VACCINE: ICD-10-CM

## 2023-10-11 DIAGNOSIS — F33.1 MODERATE EPISODE OF RECURRENT MAJOR DEPRESSIVE DISORDER (H): ICD-10-CM

## 2023-10-11 DIAGNOSIS — E55.9 VITAMIN D DEFICIENCY: ICD-10-CM

## 2023-10-11 LAB
ANION GAP SERPL CALCULATED.3IONS-SCNC: 12 MMOL/L (ref 7–15)
BASO+EOS+MONOS # BLD AUTO: NORMAL 10*3/UL
BASO+EOS+MONOS NFR BLD AUTO: NORMAL %
BASOPHILS # BLD AUTO: 0 10E3/UL (ref 0–0.2)
BASOPHILS NFR BLD AUTO: 0 %
BUN SERPL-MCNC: 21.1 MG/DL (ref 6–20)
CALCIUM SERPL-MCNC: 9.6 MG/DL (ref 8.6–10)
CHLORIDE SERPL-SCNC: 101 MMOL/L (ref 98–107)
CREAT SERPL-MCNC: 0.8 MG/DL (ref 0.51–0.95)
CREAT UR-MCNC: 263 MG/DL
DEPRECATED HCO3 PLAS-SCNC: 26 MMOL/L (ref 22–29)
EGFRCR SERPLBLD CKD-EPI 2021: 85 ML/MIN/1.73M2
EOSINOPHIL # BLD AUTO: 0.1 10E3/UL (ref 0–0.7)
EOSINOPHIL NFR BLD AUTO: 1 %
ERYTHROCYTE [DISTWIDTH] IN BLOOD BY AUTOMATED COUNT: 12.9 % (ref 10–15)
FERRITIN SERPL-MCNC: 88 NG/ML (ref 11–328)
GLUCOSE SERPL-MCNC: 90 MG/DL (ref 70–99)
HBA1C MFR BLD: 5.5 % (ref 0–5.6)
HCT VFR BLD AUTO: 40.6 % (ref 35–47)
HGB BLD-MCNC: 13.6 G/DL (ref 11.7–15.7)
IMM GRANULOCYTES # BLD: 0 10E3/UL
IMM GRANULOCYTES NFR BLD: 0 %
LYMPHOCYTES # BLD AUTO: 2.8 10E3/UL (ref 0.8–5.3)
LYMPHOCYTES NFR BLD AUTO: 46 %
MCH RBC QN AUTO: 29.4 PG (ref 26.5–33)
MCHC RBC AUTO-ENTMCNC: 33.5 G/DL (ref 31.5–36.5)
MCV RBC AUTO: 88 FL (ref 78–100)
MICROALBUMIN UR-MCNC: <12 MG/L
MICROALBUMIN/CREAT UR: NORMAL MG/G{CREAT}
MONOCYTES # BLD AUTO: 0.4 10E3/UL (ref 0–1.3)
MONOCYTES NFR BLD AUTO: 7 %
NEUTROPHILS # BLD AUTO: 2.8 10E3/UL (ref 1.6–8.3)
NEUTROPHILS NFR BLD AUTO: 46 %
PLATELET # BLD AUTO: 196 10E3/UL (ref 150–450)
POTASSIUM SERPL-SCNC: 4.3 MMOL/L (ref 3.4–5.3)
RBC # BLD AUTO: 4.62 10E6/UL (ref 3.8–5.2)
SODIUM SERPL-SCNC: 139 MMOL/L (ref 135–145)
TSH SERPL DL<=0.005 MIU/L-ACNC: 1.8 UIU/ML (ref 0.3–4.2)
VIT D+METAB SERPL-MCNC: 38 NG/ML (ref 20–50)
WBC # BLD AUTO: 6 10E3/UL (ref 4–11)

## 2023-10-11 PROCEDURE — 90472 IMMUNIZATION ADMIN EACH ADD: CPT | Performed by: FAMILY MEDICINE

## 2023-10-11 PROCEDURE — 82306 VITAMIN D 25 HYDROXY: CPT | Performed by: FAMILY MEDICINE

## 2023-10-11 PROCEDURE — 90480 ADMN SARSCOV2 VAC 1/ONLY CMP: CPT | Performed by: FAMILY MEDICINE

## 2023-10-11 PROCEDURE — 82728 ASSAY OF FERRITIN: CPT | Performed by: FAMILY MEDICINE

## 2023-10-11 PROCEDURE — 90750 HZV VACC RECOMBINANT IM: CPT | Performed by: FAMILY MEDICINE

## 2023-10-11 PROCEDURE — 84443 ASSAY THYROID STIM HORMONE: CPT | Performed by: FAMILY MEDICINE

## 2023-10-11 PROCEDURE — 99207 PR FOOT EXAM NO CHARGE: CPT | Performed by: FAMILY MEDICINE

## 2023-10-11 PROCEDURE — 82570 ASSAY OF URINE CREATININE: CPT | Performed by: FAMILY MEDICINE

## 2023-10-11 PROCEDURE — 90682 RIV4 VACC RECOMBINANT DNA IM: CPT | Performed by: FAMILY MEDICINE

## 2023-10-11 PROCEDURE — 83036 HEMOGLOBIN GLYCOSYLATED A1C: CPT | Performed by: FAMILY MEDICINE

## 2023-10-11 PROCEDURE — 90471 IMMUNIZATION ADMIN: CPT | Performed by: FAMILY MEDICINE

## 2023-10-11 PROCEDURE — 99214 OFFICE O/P EST MOD 30 MIN: CPT | Mod: 25 | Performed by: FAMILY MEDICINE

## 2023-10-11 PROCEDURE — 80048 BASIC METABOLIC PNL TOTAL CA: CPT | Performed by: FAMILY MEDICINE

## 2023-10-11 PROCEDURE — 82043 UR ALBUMIN QUANTITATIVE: CPT | Performed by: FAMILY MEDICINE

## 2023-10-11 PROCEDURE — 36415 COLL VENOUS BLD VENIPUNCTURE: CPT | Performed by: FAMILY MEDICINE

## 2023-10-11 PROCEDURE — 85025 COMPLETE CBC W/AUTO DIFF WBC: CPT | Performed by: FAMILY MEDICINE

## 2023-10-11 PROCEDURE — 90746 HEPB VACCINE 3 DOSE ADULT IM: CPT | Performed by: FAMILY MEDICINE

## 2023-10-11 PROCEDURE — 91320 SARSCV2 VAC 30MCG TRS-SUC IM: CPT | Performed by: FAMILY MEDICINE

## 2023-10-11 RX ORDER — SERTRALINE HYDROCHLORIDE 100 MG/1
100 TABLET, FILM COATED ORAL DAILY
Qty: 90 TABLET | Refills: 3 | Status: SHIPPED | OUTPATIENT
Start: 2023-10-11 | End: 2024-01-02

## 2023-10-11 RX ORDER — ATORVASTATIN CALCIUM 20 MG/1
20 TABLET, FILM COATED ORAL DAILY
Qty: 90 TABLET | Refills: 3 | Status: SHIPPED | OUTPATIENT
Start: 2023-10-11

## 2023-10-11 ASSESSMENT — ENCOUNTER SYMPTOMS: NERVOUS/ANXIOUS: 1

## 2023-10-11 NOTE — PROGRESS NOTES
"  Assessment & Plan     Morbid obesity (H)  Has had nice interval weight loss on Mounjaro.  Ready to go up to the 12.5 mg dose.  Has fairly severe constipation at times from the Mounjaro and we discussed bowel strategies to get this better controlled.  Recommended follow-up in 2 to 3 months time for recheck either with myself or Pharm.D.  - tirzepatide (MOUNJARO) 12.5 MG/0.5ML pen; Inject 12.5 mg Subcutaneous every 7 days    Type 2 diabetes mellitus without complication, without long-term current use of insulin (H)  Has been under good control on the Mounjaro.  We will get labs up-to-date  - HEMOGLOBIN A1C; Future  - BASIC METABOLIC PANEL; Future  - TSH WITH FREE T4 REFLEX; Future  - tirzepatide (MOUNJARO) 12.5 MG/0.5ML pen; Inject 12.5 mg Subcutaneous every 7 days  - Albumin Random Urine Quantitative with Creat Ratio  - HEMOGLOBIN A1C  - BASIC METABOLIC PANEL  - TSH WITH FREE T4 REFLEX  - FOOT EXAM    Hyperlipidemia LDL goal <100  On statin  - atorvastatin (LIPITOR) 20 MG tablet; Take 1 tablet (20 mg) by mouth daily    Moderate episode of recurrent major depressive disorder (H)  Well-controlled on sertraline.  Happy with current dose and would like to continue  - sertraline (ZOLOFT) 100 MG tablet; Take 1 tablet (100 mg) by mouth daily    Iron deficiency anemia, unspecified iron deficiency anemia type  History of, status post gastric bypass  - Ferritin; Future  - CBC with Platelets & Differential; Future  - Ferritin  - CBC with Platelets & Differential    Vitamin D deficiency  History of, status post gastric bypass  - Vitamin D Deficiency; Future  - Vitamin D Deficiency    High priority for 2019-nCoV vaccine  Vaccine completed today    Need for prophylactic vaccination and inoculation against influenza  Vaccine completed today       BMI:   Estimated body mass index is 40.44 kg/m  as calculated from the following:    Height as of this encounter: 1.702 m (5' 7\").    Weight as of this encounter: 117.1 kg (258 lb 3.2 " oz).   Weight management plan: Continue Mounjaro and healthy eating.    Patient Instructions   If no bm for 1 day start 1-2 Senna (laxative over the counter). If no bowel movement after 2 days Senna 2 tablets twice daily and consider suppository.     Start powdered pyllium fiber such as Metamucil or Citrucel: start 1 tsp per day (mixed with fluid), and increase by 1 tsp per week to goal total dosing of 1-2 tablespoons per day. Drink plenty of water daily (at least 40-60 oz) for fiber to be effective.     Virtual visit 6-8 weeks after increase of mounjaro dose to 12.5 mg                     Cecile Soria MD  [facility] ABDULLAHI Hay is a 57 year old, presenting for the following health issues:  Depression, Anxiety, Diabetes, and Lipids        10/11/2023     2:36 PM   Additional Questions   Roomed by Mckayla   Accompanied by Self         10/11/2023     2:36 PM   Patient Reported Additional Medications   Patient reports taking the following new medications None       History of Present Illness       Mental Health Follow-up:  Patient presents to follow-up on Depression.Patient's depression since last visit has been:  Good  The patient is not having other symptoms associated with depression.      Any significant life events: No  Patient is not feeling anxious or having panic attacks.  Patient has no concerns about alcohol or drug use.    Diabetes:   She presents for follow up of diabetes.  She is checking home blood glucose a few times a month.   She checks blood glucose at bedtime.  Blood glucose is never over 200 and never under 70. She is aware of hypoglycemia symptoms including none.    She has no concerns regarding her diabetes at this time.   She is not experiencing numbness or burning in feet, excessive thirst, blurry vision, weight changes or redness, sores or blisters on feet. The patient has had a diabetic eye exam in the last 12 months. Eye exam performed on 6/2023.  "Location of last eye exam [facility].        Hyperlipidemia:  She presents for follow up of hyperlipidemia.   She is taking medication to lower cholesterol. She is not having myalgia or other side effects to statin medications.    She eats 2-3 servings of fruits and vegetables daily.She consumes 0 sweetened beverage(s) daily.She exercises with enough effort to increase her heart rate 20 to 29 minutes per day.  She exercises with enough effort to increase her heart rate 4 days per week.   She is taking medications regularly.       Up to 10 mg on mounjaro  Lost 20 lb since last visit  Pharm D is no longer covered.   Interested in going up on mounjaro to 12.5mg    Constipation occasionally. Taking otc magnesium, docusate.   magnesium citrate 400mg daily  Magnesium oxide 700-1050mg daily  Hemorrhoids starting  Bm's usually 1-2 per day but at times will go a few days without going.     Ozempic in past did not help weight loss.  Changed insurance companies and is unsure what her Mounjaro coverage will be going forward    Tried to drop protonoix to 20 mg but didn't go well and gerd returned    Eye exam utd.   Vit D 1,000, 2 tablets per day.     Gyn exam/breast exam utd      Review of Systems   Psychiatric/Behavioral:  The patient is nervous/anxious.             Objective    /78 (BP Location: Right arm, Patient Position: Sitting, Cuff Size: Adult Large)   Pulse 82   Temp 98.2  F (36.8  C) (Oral)   Resp 14   Ht 1.702 m (5' 7\")   Wt 117.1 kg (258 lb 3.2 oz)   LMP 06/06/2015   SpO2 97%   BMI 40.44 kg/m    Body mass index is 40.44 kg/m .  Physical Exam   GENERAL: healthy, alert and no distress  NECK: no adenopathy, no asymmetry, masses, or scars and thyroid normal to palpation  RESP: lungs clear to auscultation - no rales, rhonchi or wheezes  CV: regular rate and rhythm, normal S1 S2, no S3 or S4, no murmur, click or rub, no peripheral edema and peripheral pulses strong  MS: no gross musculoskeletal defects noted, no " edema  PSYCH: mentation appears normal, affect normal/bright  Diabetic foot exam: normal DP and PT pulses, no trophic changes or ulcerative lesions, normal sensory exam, and normal monofilament exam

## 2023-10-11 NOTE — PATIENT INSTRUCTIONS
If no bm for 1 day start 1-2 Senna (laxative over the counter). If no bowel movement after 2 days Senna 2 tablets twice daily and consider suppository.     Start powdered pyllium fiber such as Metamucil or Citrucel: start 1 tsp per day (mixed with fluid), and increase by 1 tsp per week to goal total dosing of 1-2 tablespoons per day. Drink plenty of water daily (at least 40-60 oz) for fiber to be effective.     Virtual visit 6-8 weeks after increase of mounjaro dose to 12.5 mg

## 2023-10-11 NOTE — PROGRESS NOTES
Prior to immunization administration, verified patients identity using patient s name and date of birth. Please see Immunization Activity for additional information.     Screening Questionnaire for Adult Immunization    Are you sick today?   No   Do you have allergies to medications, food, a vaccine component or latex?   No   Have you ever had a serious reaction after receiving a vaccination?   No   Do you have a long-term health problem with heart, lung, kidney, or metabolic disease (e.g., diabetes), asthma, a blood disorder, no spleen, complement component deficiency, a cochlear implant, or a spinal fluid leak?  Are you on long-term aspirin therapy?   No   Do you have cancer, leukemia, HIV/AIDS, or any other immune system problem?   No   Do you have a parent, brother, or sister with an immune system problem?   No   In the past 3 months, have you taken medications that affect  your immune system, such as prednisone, other steroids, or anticancer drugs; drugs for the treatment of rheumatoid arthritis, Crohn s disease, or psoriasis; or have you had radiation treatments?   No   Have you had a seizure, or a brain or other nervous system problem?   No   During the past year, have you received a transfusion of blood or blood    products, or been given immune (gamma) globulin or antiviral drug?   No   For women: Are you pregnant or is there a chance you could become       pregnant during the next month?   No   Have you received any vaccinations in the past 4 weeks?   No     Immunization questionnaire answers were all negative.      Patient instructed to remain in clinic for 15 minutes afterwards, and to report any adverse reactions.     Screening performed by Madhavi Cain MA on 10/11/2023 at 3:44 PM.

## 2023-10-11 NOTE — LETTER
October 20, 2023      Satnam Mitchell  [address]  [place] [facility] [address]        Dear ,    We are writing to inform you of your test results.    Satnam,  It was a pleasure to see you in the office recently.  Overall, your labs look good.  - the kidney and electrolyte panel was normal (slightly elevated urea level likely related to mild dehydration at the time of lab draw).  The urine protein level was normal also supporting your kidneys are working well.  - the blood count panel was normal.  No anemia was seen.  The iron level, ferritin, was good.  -Thyroid test was normal  -The vitamin D level was normal.  -Your A1c continues to show excellent control of the diabetes.  Your average glucose is now back into the normal non-diabetic range!  Please continue your current vitamins and supplements.  Please MyChart or call if you have any concerns or questions.    Resulted Orders   Ferritin   Result Value Ref Range    Ferritin 88 11 - 328 ng/mL   HEMOGLOBIN A1C   Result Value Ref Range    Hemoglobin A1C 5.5 0.0 - 5.6 %      Comment:      Normal <5.7%   Prediabetes 5.7-6.4%    Diabetes 6.5% or higher     Note: Adopted from ADA consensus guidelines.   BASIC METABOLIC PANEL   Result Value Ref Range    Sodium 139 135 - 145 mmol/L      Comment:      Reference intervals for this test were updated on 09/26/2023 to more accurately reflect our healthy population. There may be differences in the flagging of prior results with similar values performed with this method. Interpretation of those prior results can be made in the context of the updated reference intervals.     Potassium 4.3 3.4 - 5.3 mmol/L    Chloride 101 98 - 107 mmol/L    Carbon Dioxide (CO2) 26 22 - 29 mmol/L    Anion Gap 12 7 - 15 mmol/L    Urea Nitrogen 21.1 (H) 6.0 - 20.0 mg/dL    Creatinine 0.80 0.51 - 0.95 mg/dL    GFR Estimate 85 >60 mL/min/1.73m2    Calcium 9.6 8.6 - 10.0 mg/dL    Glucose 90 70 - 99 mg/dL   TSH WITH FREE T4 REFLEX   Result Value  Ref Range    TSH 1.80 0.30 - 4.20 uIU/mL   Vitamin D Deficiency   Result Value Ref Range    Vitamin D, Total (25-Hydroxy) 38 20 - 50 ng/mL      Comment:      optimum levels    Narrative    Season, race, dietary intake, and treatment affect the concentration of 25-hydroxy-Vitamin D. Values may decrease during winter months and increase during summer months.    Vitamin D determination is routinely performed by an immunoassay specific for 25 hydroxyvitamin D3.  If an individual is on vitamin D2(ergocalciferol) supplementation, please specify 25 OH vitamin D2 and D3 level determination by LCMSMS test VITD23.         If you have any questions or concerns, please call the clinic at the number listed above.       Sincerely,      Cecile Soria MD

## 2023-10-13 NOTE — RESULT ENCOUNTER NOTE
Satnam,  It was a pleasure to see you in the office recently.   Overall, your labs look good.  - the kidney and electrolyte panel was normal (slightly elevated urea level likely related to mild dehydration at the time of lab draw).  The urine protein level was normal also supporting your kidneys are working well.  - the blood count panel was normal.  No anemia was seen.  The iron level, ferritin, was good.  -Thyroid test was normal  -The vitamin D level was normal.  -Your A1c continues to show excellent control of the diabetes.  Your average glucose is now back into the normal non-diabetic range!   Please continue your current vitamins and supplements.  Please MyChart or call if you have any concerns or questions.   Sincerely,  Cecile Soria MD

## 2023-11-06 DIAGNOSIS — E11.9 TYPE 2 DIABETES MELLITUS WITHOUT COMPLICATION, WITHOUT LONG-TERM CURRENT USE OF INSULIN (H): ICD-10-CM

## 2023-11-06 DIAGNOSIS — E66.01 MORBID OBESITY (H): ICD-10-CM

## 2023-11-06 RX ORDER — TIRZEPATIDE 10 MG/.5ML
10 INJECTION, SOLUTION SUBCUTANEOUS
Qty: 6 ML | Refills: 0 | Status: SHIPPED | OUTPATIENT
Start: 2023-11-06 | End: 2024-01-10 | Stop reason: DRUGHIGH

## 2024-01-02 ENCOUNTER — MYC REFILL (OUTPATIENT)
Dept: FAMILY MEDICINE | Facility: CLINIC | Age: 59
End: 2024-01-02
Payer: COMMERCIAL

## 2024-01-02 DIAGNOSIS — E11.9 TYPE 2 DIABETES MELLITUS WITHOUT COMPLICATION, WITHOUT LONG-TERM CURRENT USE OF INSULIN (H): ICD-10-CM

## 2024-01-02 DIAGNOSIS — E66.01 MORBID OBESITY (H): ICD-10-CM

## 2024-01-02 DIAGNOSIS — F33.1 MODERATE EPISODE OF RECURRENT MAJOR DEPRESSIVE DISORDER (H): ICD-10-CM

## 2024-01-03 RX ORDER — SERTRALINE HYDROCHLORIDE 100 MG/1
100 TABLET, FILM COATED ORAL DAILY
Qty: 90 TABLET | Refills: 1 | Status: SHIPPED | OUTPATIENT
Start: 2024-01-03 | End: 2024-07-01

## 2024-01-05 ENCOUNTER — TELEPHONE (OUTPATIENT)
Dept: FAMILY MEDICINE | Facility: CLINIC | Age: 59
End: 2024-01-05
Payer: COMMERCIAL

## 2024-01-05 DIAGNOSIS — E66.01 MORBID OBESITY (H): ICD-10-CM

## 2024-01-05 DIAGNOSIS — E11.9 TYPE 2 DIABETES MELLITUS WITHOUT COMPLICATION, WITHOUT LONG-TERM CURRENT USE OF INSULIN (H): ICD-10-CM

## 2024-01-05 NOTE — TELEPHONE ENCOUNTER
Pharmacy Message: One box of Mounjaro is 2 ML. We cannot fill for one pen= 0.5. Please, send us an updated RX for at least 2 ML= 1 Box

## 2024-01-07 ENCOUNTER — MYC REFILL (OUTPATIENT)
Dept: FAMILY MEDICINE | Facility: CLINIC | Age: 59
End: 2024-01-07
Payer: COMMERCIAL

## 2024-01-07 DIAGNOSIS — E78.5 HYPERLIPIDEMIA LDL GOAL <100: ICD-10-CM

## 2024-01-07 DIAGNOSIS — E11.9 TYPE 2 DIABETES MELLITUS WITHOUT COMPLICATION, WITHOUT LONG-TERM CURRENT USE OF INSULIN (H): ICD-10-CM

## 2024-01-07 DIAGNOSIS — E66.01 MORBID OBESITY (H): ICD-10-CM

## 2024-01-08 RX ORDER — ATORVASTATIN CALCIUM 20 MG/1
20 TABLET, FILM COATED ORAL DAILY
Qty: 90 TABLET | Refills: 3 | OUTPATIENT
Start: 2024-01-08

## 2024-01-11 ENCOUNTER — TRANSFERRED RECORDS (OUTPATIENT)
Dept: HEALTH INFORMATION MANAGEMENT | Facility: CLINIC | Age: 59
End: 2024-01-11
Payer: COMMERCIAL

## 2024-02-06 ENCOUNTER — VIRTUAL VISIT (OUTPATIENT)
Dept: PHARMACY | Facility: CLINIC | Age: 59
End: 2024-02-06
Payer: COMMERCIAL

## 2024-02-06 DIAGNOSIS — E78.5 HYPERLIPIDEMIA LDL GOAL <70: ICD-10-CM

## 2024-02-06 DIAGNOSIS — E11.9 TYPE 2 DIABETES MELLITUS WITHOUT COMPLICATION, WITHOUT LONG-TERM CURRENT USE OF INSULIN (H): Primary | ICD-10-CM

## 2024-02-06 DIAGNOSIS — K21.00 GASTROESOPHAGEAL REFLUX DISEASE WITH ESOPHAGITIS, UNSPECIFIED WHETHER HEMORRHAGE: ICD-10-CM

## 2024-02-06 DIAGNOSIS — F32.9 MAJOR DEPRESSIVE DISORDER, REMISSION STATUS UNSPECIFIED, UNSPECIFIED WHETHER RECURRENT: ICD-10-CM

## 2024-02-06 DIAGNOSIS — Z78.9 TAKES DIETARY SUPPLEMENTS: ICD-10-CM

## 2024-02-06 DIAGNOSIS — K64.9 HEMORRHOIDS, UNSPECIFIED HEMORRHOID TYPE: ICD-10-CM

## 2024-02-06 DIAGNOSIS — E66.01 MORBID OBESITY (H): ICD-10-CM

## 2024-02-06 DIAGNOSIS — K59.00 CONSTIPATION, UNSPECIFIED CONSTIPATION TYPE: ICD-10-CM

## 2024-02-06 PROCEDURE — 99207 PR NO CHARGE LOS: CPT | Mod: 93 | Performed by: PHARMACIST

## 2024-02-06 RX ORDER — MULTIVIT-MIN/FOLIC/VIT K/LYCOP 400-20-370
TABLET ORAL 3 TIMES DAILY PRN
COMMUNITY
Start: 2024-01-11

## 2024-02-06 RX ORDER — NIFEDIPINE
POWDER (GRAM) MISCELLANEOUS
COMMUNITY
Start: 2024-01-17

## 2024-02-06 RX ORDER — TIRZEPATIDE 10 MG/.5ML
10 INJECTION, SOLUTION SUBCUTANEOUS
Qty: 6 ML | Refills: 1 | Status: SHIPPED | OUTPATIENT
Start: 2024-02-06 | End: 2024-08-06 | Stop reason: DRUGHIGH

## 2024-02-06 NOTE — PROGRESS NOTES
Medication Therapy Management (MTM) Encounter    ASSESSMENT:                            Medication Adherence/Access: No issues identified    Diabetes/Obesity:   She's made tremendous strides with weight loss, and A1c is well below goal <7%.  Agree Mounjaro can be reduced slightly to help with tolerability.  Is appropriately on aspirin therapy and ACE/ARB is not indicated due to negative microalbumin and well controlled blood pressure.    Constipation:  Improved.    Hemorrhoids:  Improved.    Hyperlipidemia:   Stable.     Depression:   Stable.    GERD:   Stable.    Supplements:  Stable.    PLAN:                            Reduced Mounjaro to 10mg    Follow-up: Return in about 6 months (around 8/6/2024) for Follow-up Medication Review.    SUBJECTIVE/OBJECTIVE:                          Satnam Mitchell is a 58 year old female called for a follow-up visit from 9/21/2023.       Reason for visit: Diabetes/weight management follow-up.    Tobacco: She reports that she has never smoked. She has never used smokeless tobacco.  Alcohol: occasionally    Medication Adherence/Access: no issues reported    Diabetes /Obesity:    Mounjaro 12.5mg weekly  Aspirin 81mg daily  Has glucose tablets available, but has not needed to use  Patient is experiencing more side effects with 12.5mg dose of Mounjaro and would like to reduce back to 10mg  Blood sugar monitoring: Never - she hasn't been using continuous glucose monitor recently  Current diabetes symptoms: none     Eye exam is up to date  Foot exam is up to date  Urine Albumin:   Lab Results   Component Value Date    UMALCR  10/11/2023      Comment:      Unable to calculate, urine albumin and/or urine creatinine is outside detectable limits.  Microalbuminuria is defined as an albumin:creatinine ratio of 17 to 299 for males and 25 to 299 for females. A ratio of albumin:creatinine of 300 or higher is indicative of overt proteinuria.  Due to biologic variability, positive results should be  confirmed by a second, first-morning random or 24-hour timed urine specimen. If there is discrepancy, a third specimen is recommended. When 2 out of 3 results are in the microalbuminuria range, this is evidence for incipient nephropathy and warrants increased efforts at glucose control, blood pressure control, and institution of therapy with an angiotensin-converting-enzyme (ACE) inhibitor (if the patient can tolerate it).        Lab Results   Component Value Date    A1C 5.5 10/11/2023      Home weight - 248 lbs  Starting weight - 299 lbs   Total weight loss - 51 lbs (-17.5% body weight)    Constipation:  Linzess 145mcg daily  Metamucil 1 tsp daily  She saw GI and reports constipation has been much improved following initiation of Linzess.  No side effects reported.    Hemorrhoids:  Hemorrhoidal cream three times daily as needed  Nifedipine compounded cream as needed  She reports these are effective, no side effects reported.    Hyperlipidemia   Atorvastatin 20mg daily  Patient reports no significant myalgias or other side effects.     Recent Labs   Lab Test 04/07/23  0849 03/10/22  1603   CHOL 159 204*   HDL 63 61   LDL 76 116*   TRIG 98 133      Depression:  Sertraline 100mg daily.   Patient reports no current medication side effects.  Patient reports symptoms are stable.     GERD:   Pantoprazole 40 mg once daily   Patient reports no current symptoms but was unable to reduce down to 20mg dose.    Supplements:   Vitamin B12 500mcg daily  Flintstones multivitamin daily  Vitamin D 2000 international unit(s) daily  No reported issues at this time.       Today's Vitals: LMP 06/06/2015   ----------------    I spent 8 minutes with this patient today. All changes were made via collaborative practice agreement with Dr. Soria. A copy of the visit note was provided to the patient's provider(s).    A summary of these recommendations was sent via [employer].    Selena Goldstein, PharmD, BCACP  Medication Therapy Management  Provider  [phone]     Telemedicine Visit Details  Type of service:  Telephone visit  Start Time: 10:38 AM  End Time: 10:46 AM     Medication Therapy Recommendations  Type 2 diabetes mellitus without complication, without long-term current use of insulin (H)    Current Medication: tirzepatide (MOUNJARO) 12.5 MG/0.5ML pen (Discontinued)   Rationale: Undesirable effect - Adverse medication event - Safety   Recommendation: Decrease Dose - Mounjaro 10 MG/0.5ML Sopn   Status: Accepted per CPA

## 2024-02-06 NOTE — PATIENT INSTRUCTIONS
"Recommendations from today's MTM visit:                                                    MTM (medication therapy management) is a service provided by a clinical pharmacist designed to help you get the most of out of your medicines.   Today we reviewed what your medicines are for, how to know if they are working, that your medicines are safe and how to make your medicine regimen as easy as possible.      Reduced Mounjaro to 10mg     Follow-up: Return in about 6 months (around 8/6/2024) for Follow-up Medication Review.    It was great speaking with you today.  I value your experience and would be very thankful for your time in providing feedback in our clinic survey. In the next few days, you may receive an email or text message from [employer] with a link to a survey related to your  clinical pharmacist.\"     To schedule another MTM appointment, please call the clinic directly or you may call the MTM scheduling line at [phone] or toll-free at [phone].     My Clinical Pharmacist's contact information:                                                      Please feel free to contact me with any questions or concerns you have.      Selena Goldstein, PharmD, [facility]  Medication Therapy Management Provider  [phone]    "

## 2024-04-01 DIAGNOSIS — K21.9 CHRONIC GERD: ICD-10-CM

## 2024-04-02 RX ORDER — PANTOPRAZOLE SODIUM 40 MG/1
TABLET, DELAYED RELEASE ORAL
Qty: 90 TABLET | Refills: 1 | Status: SHIPPED | OUTPATIENT
Start: 2024-04-02

## 2024-04-05 ENCOUNTER — TELEPHONE (OUTPATIENT)
Dept: FAMILY MEDICINE | Facility: CLINIC | Age: 59
End: 2024-04-05
Payer: COMMERCIAL

## 2024-04-05 ENCOUNTER — E-VISIT (OUTPATIENT)
Dept: URGENT CARE | Facility: CLINIC | Age: 59
End: 2024-04-05
Payer: COMMERCIAL

## 2024-04-05 DIAGNOSIS — R30.0 DYSURIA: Primary | ICD-10-CM

## 2024-04-05 PROCEDURE — 99207 PR NON-BILLABLE SERV PER CHARTING: CPT | Performed by: PHYSICIAN ASSISTANT

## 2024-04-05 NOTE — TELEPHONE ENCOUNTER
Patient called in stating she just went to the bathroom and noticed blood in her urine.  She states when she wiped the toilet tissue was red.  She denies any flank pain or other pain except when she urinates.  No history of kidney stones.  She denies fever, urgency, frequency or burning.  She states she just got back from [place] and was swimming a lot down there and also notes she has a terrible cold as well.      Directed patient to place an E-visit for urinary symptoms.  She verbalized understanding.      Kristina Kjellberg, MSN, RN

## 2024-04-05 NOTE — PATIENT INSTRUCTIONS
Dear Juliann Mitchell,     After reviewing your responses, I would like you to come in for a urine test to make sure we treat you correctly. This urine test is to evaluate you for a possible urinary tract infection, and should be scheduled for today or tomorrow. Schedule a Lab Only appointment here.     Lab appointments are not available at most locations on the weekends. If no Lab Only appointment is available, you should be seen in any of our convenient Walk-in or Urgent Care Centers, which can be found on our website here.     You will receive instructions with your results in [employer] once they are available.     If your symptoms worsen, you develop pain in your back or stomach, develop fevers, or are not improving in 5 days, please contact your primary care provider for an appointment or visit a Walk-in or Urgent Care Center to be seen.     Thanks again for choosing us as your health care partner,     Leticia Allen PA-C

## 2024-04-21 SDOH — HEALTH STABILITY: PHYSICAL HEALTH: ON AVERAGE, HOW MANY DAYS PER WEEK DO YOU ENGAGE IN MODERATE TO STRENUOUS EXERCISE (LIKE A BRISK WALK)?: 5 DAYS

## 2024-04-21 SDOH — HEALTH STABILITY: PHYSICAL HEALTH: ON AVERAGE, HOW MANY MINUTES DO YOU ENGAGE IN EXERCISE AT THIS LEVEL?: 30 MIN

## 2024-04-21 ASSESSMENT — SOCIAL DETERMINANTS OF HEALTH (SDOH): HOW OFTEN DO YOU GET TOGETHER WITH FRIENDS OR RELATIVES?: THREE TIMES A WEEK

## 2024-04-24 ENCOUNTER — ANCILLARY PROCEDURE (OUTPATIENT)
Dept: GENERAL RADIOLOGY | Facility: CLINIC | Age: 59
End: 2024-04-24
Attending: FAMILY MEDICINE
Payer: COMMERCIAL

## 2024-04-24 ENCOUNTER — OFFICE VISIT (OUTPATIENT)
Dept: FAMILY MEDICINE | Facility: CLINIC | Age: 59
End: 2024-04-24
Payer: COMMERCIAL

## 2024-04-24 VITALS
BODY MASS INDEX: 39.24 KG/M2 | SYSTOLIC BLOOD PRESSURE: 111 MMHG | HEIGHT: 67 IN | OXYGEN SATURATION: 100 % | TEMPERATURE: 97.7 F | HEART RATE: 78 BPM | WEIGHT: 250 LBS | RESPIRATION RATE: 14 BRPM | DIASTOLIC BLOOD PRESSURE: 78 MMHG

## 2024-04-24 DIAGNOSIS — F33.1 MODERATE EPISODE OF RECURRENT MAJOR DEPRESSIVE DISORDER (H): ICD-10-CM

## 2024-04-24 DIAGNOSIS — E66.01 MORBID OBESITY (H): ICD-10-CM

## 2024-04-24 DIAGNOSIS — E78.5 HYPERLIPIDEMIA LDL GOAL <100: ICD-10-CM

## 2024-04-24 DIAGNOSIS — N39.0 URINARY TRACT INFECTION WITH HEMATURIA, SITE UNSPECIFIED: ICD-10-CM

## 2024-04-24 DIAGNOSIS — E11.9 TYPE 2 DIABETES MELLITUS WITHOUT COMPLICATION, WITHOUT LONG-TERM CURRENT USE OF INSULIN (H): ICD-10-CM

## 2024-04-24 DIAGNOSIS — Z90.3 H/O GASTRIC SLEEVE: ICD-10-CM

## 2024-04-24 DIAGNOSIS — R31.9 URINARY TRACT INFECTION WITH HEMATURIA, SITE UNSPECIFIED: ICD-10-CM

## 2024-04-24 DIAGNOSIS — N62 LARGE BREASTS: ICD-10-CM

## 2024-04-24 DIAGNOSIS — Z13.220 SCREENING CHOLESTEROL LEVEL: ICD-10-CM

## 2024-04-24 DIAGNOSIS — K21.9 CHRONIC GERD: ICD-10-CM

## 2024-04-24 DIAGNOSIS — R10.9 ABDOMINAL PAIN, UNSPECIFIED ABDOMINAL LOCATION: ICD-10-CM

## 2024-04-24 DIAGNOSIS — Z00.00 ROUTINE GENERAL MEDICAL EXAMINATION AT A HEALTH CARE FACILITY: Primary | ICD-10-CM

## 2024-04-24 DIAGNOSIS — D49.7 PITUITARY TUMOR: ICD-10-CM

## 2024-04-24 PROCEDURE — 74019 RADEX ABDOMEN 2 VIEWS: CPT | Mod: TC | Performed by: RADIOLOGY

## 2024-04-24 PROCEDURE — 99396 PREV VISIT EST AGE 40-64: CPT | Performed by: FAMILY MEDICINE

## 2024-04-24 RX ORDER — PANTOPRAZOLE SODIUM 40 MG/1
40 TABLET, DELAYED RELEASE ORAL DAILY
Qty: 90 TABLET | Refills: 1 | Status: CANCELLED | OUTPATIENT
Start: 2024-04-24

## 2024-04-24 RX ORDER — ATORVASTATIN CALCIUM 20 MG/1
20 TABLET, FILM COATED ORAL DAILY
Qty: 90 TABLET | Refills: 3 | Status: CANCELLED | OUTPATIENT
Start: 2024-04-24

## 2024-04-24 RX ORDER — TIRZEPATIDE 10 MG/.5ML
10 INJECTION, SOLUTION SUBCUTANEOUS
Qty: 6 ML | Refills: 1 | Status: CANCELLED | OUTPATIENT
Start: 2024-04-24

## 2024-04-24 RX ORDER — SERTRALINE HYDROCHLORIDE 100 MG/1
100 TABLET, FILM COATED ORAL DAILY
Qty: 90 TABLET | Refills: 1 | Status: CANCELLED | OUTPATIENT
Start: 2024-04-24

## 2024-04-24 ASSESSMENT — PAIN SCALES - GENERAL: PAINLEVEL: MILD PAIN (3)

## 2024-04-24 ASSESSMENT — ASTHMA QUESTIONNAIRES: ACT_TOTALSCORE: 25

## 2024-04-24 NOTE — PROGRESS NOTES
Preventive Care Visit  [facility]  Cecile Lucia Soria MD, Family Medicine  Apr 24, 2024      Assessment & Plan     Routine general medical examination at a health care facility    Abdominal pain, unspecified abdominal location  Somewhat longstanding history of periumbilical and epigastric discomfort that has worsened in the last several weeks.  She has tenderness in the epigastrium region today.  It is unclear if her pain could be related to constipation, GLP-1 agonist, or other factors such as gastritis/ulcer, etc.  Will have her treat constipation a little more aggressively and consider EGD if labs are negative  - Comprehensive metabolic panel (BMP + Alb, Alk Phos, ALT, AST, Total. Bili, TP); Future  - Lipase; Future  - UA Macroscopic with reflex to Microscopic and Culture - Lab Collect; Future  - XR Abdomen 2 Views; Future  - Helicobacter pylori Antigen Stool; Future    H/O gastric sleeve  - Vitamin B12; Future  - Folate; Future    Morbid obesity (H)  Is on Mounjaro with dose decreased from 12.5 to 10 mg this winter due to constipation.  Has been losing weight at this dosing.  I will likely drop her down to 7.5 mg to see if this could be contributing to her pain.  Continues to see our Pharm.D. to assist with this medication    Type 2 diabetes mellitus without complication, without long-term current use of insulin (H)  A1c is back into the nondiabetic range with her weight loss.  She will continue to get yearly eye exam  - Hemoglobin A1c; Future    Moderate episode of recurrent major depressive disorder (H)  Stable on sertraline    Urinary tract infection with hematuria, site unspecified  Symptoms improved  - UA Macroscopic with reflex to Microscopic and Culture - Lab Collect; Future    Hyperlipidemia LDL goal <100  On statin    Pituitary tumor  Many years ago.  Monitoring prolactin yearly  - Prolactin; Future    Chronic GERD  On PPI daily    Large breasts  Interested in  "reduction  - Adult Plastic Surgery  Referral; Future    Screening cholesterol level  - Lipid panel reflex to direct LDL Fasting; Future              BMI  Estimated body mass index is 39.45 kg/m  as calculated from the following:    Height as of this encounter: 1.695 m (5' 6.75\").    Weight as of this encounter: 113.4 kg (250 lb).       Counseling  Appropriate preventive services were discussed with this patient, including applicable screening as appropriate for fall prevention, nutrition, physical activity, Tobacco-use cessation, weight loss and cognition.  Checklist reviewing preventive services available has been given to the patient.  Reviewed patient's diet, addressing concerns and/or questions.           Madhuri Hay is a 58 year old, presenting for the following:  Physical        4/24/2024     3:06 PM   Additional Questions   Roomed by Val BATISTA   Accompanied by self         4/24/2024     3:06 PM   Patient Reported Additional Medications   Patient reports taking the following new medications None            Healthy Habits:     Getting at least 3 servings of Calcium per day:  Yes    Bi-annual eye exam:  NO    Dental care twice a year:  Yes    Sleep apnea or symptoms of sleep apnea:  None    Diet:  Carbohydrate counting and Low salt    Frequency of exercise:  4-5 days/week    Duration of exercise:  30-45 minutes    Taking medications regularly:  Yes    Barriers to taking medications:  None    Medication side effects:  None    Additional concerns today:  Yes (If UTI cleared up.)    Eye exam every summer     Uti- abd pain on vacation. When got home treated for uti.   Still feel achey pain in center of abd, worse prior to bm, dull and worse with eating.   Tends to have chronic intermittent pain for some time but recently worsened with vacation.     Has been very constipated. Saw GI specialist. Take metamucil, fiber, and started linzess.   Stooling daily. Most of the time fully evacuating but not " always.   Dropped Mounjaro from 12.5 to 10 mg in Jan due to the constipation. Has lost 8 lbs still since then.   Only side effect of mounjaro is constipation and occ stomach upset (daily on further questioning) . Eating more bland. Overnight oats. Changed her protein shake to organic option.   Etoh also really upsets her stomach.   Taking protonix daily. Peppermint dots help. Tums occasionally. Feels like   No vomiting, nl appetite on mounjaro.     Shoulder neck pain from larger breasts.   Rash under breasts at times, uses powder for prevention  Interested in reduction.     Taking mvi  only few times a week.     Pap through [facility].         4/21/2024   General Health   How would you rate your overall physical health? Good   Feel stress (tense, anxious, or unable to sleep) Not at all         4/21/2024   Nutrition   Three or more servings of calcium each day? Yes   Diet: Diabetic    Carbohydrate counting   How many servings of fruit and vegetables per day? (!) 2-3   How many sweetened beverages each day? 0-1         4/21/2024   Exercise   Days per week of moderate/strenous exercise 5 days   Average minutes spent exercising at this level 30 min         4/21/2024   Social Factors   Frequency of gathering with friends or relatives Three times a week   Worry food won't last until get money to buy more No   Food not last or not have enough money for food? No   Do you have housing?  Yes   Are you worried about losing your housing? No   Lack of transportation? No   Unable to get utilities (heat,electricity)? No         4/24/2024   Fall Risk   Fallen 2 or more times in the past year? No   Trouble with walking or balance? No          4/21/2024   Dental   Dentist two times every year? Yes         4/21/2024   TB Screening   Were you born outside of the [place]? No       Today's PHQ-9 Score:       4/23/2024     4:42 PM   PHQ-9 SCORE   PHQ-9 Total Score MyChart 1 (Minimal depression)   PHQ-9 Total Score 1         4/21/2024    Substance Use   Alcohol more than 3/day or more than 7/wk No   Do you use any other substances recreationally? No     Social History     Tobacco Use    Smoking status: Never     Passive exposure: Never    Smokeless tobacco: Never   Vaping Use    Vaping status: Never Used   Substance Use Topics    Alcohol use: Yes     Comment: 1 drink per week    Drug use: No           6/13/2023   LAST FHS-7 RESULTS   1st degree relative breast or ovarian cancer No   Any relative bilateral breast cancer No   Any male have breast cancer No   Any ONE woman have BOTH breast AND ovarian cancer No   Any woman with breast cancer before 50yrs No   2 or more relatives with breast AND/OR ovarian cancer No   2 or more relatives with breast AND/OR bowel cancer No                4/21/2024   STI Screening   New sexual partner(s) since last STI/HIV test? No     History of abnormal Pap smear: NO - age 30-65 PAP every 5 years with negative HPV co-testing recommended        1/27/2022     3:31 PM 1/4/2017    12:00 AM   PAP / HPV   PAP-ABSTRACT See Scanned Document     See Scanned Document           This result is from an external source.     ASCVD Risk   The 10-year ASCVD risk score (Cameron DK, et al., 2019) is: 3.2%    Values used to calculate the score:      Age: 58 years      Sex: Female      Is Non- : No      Diabetic: Yes      Tobacco smoker: No      Systolic Blood Pressure: 111 mmHg      Is BP treated: No      HDL Cholesterol: 63 mg/dL      Total Cholesterol: 159 mg/dL           Reviewed and updated as needed this visit by Provider                          Review of Systems  Constitutional, neuro, ENT, endocrine, pulmonary, cardiac, gastrointestinal, genitourinary, musculoskeletal, integument and psychiatric systems are negative, except as otherwise noted.     Objective    Exam  /78 (BP Location: Right arm, Patient Position: Sitting, Cuff Size: Adult Large)   Pulse 78   Temp 97.7  F (36.5  C) (Oral)    "Resp 14   Ht 1.695 m (5' 6.75\")   Wt 113.4 kg (250 lb)   LMP 06/06/2015   SpO2 100%   BMI 39.45 kg/m     Estimated body mass index is 39.45 kg/m  as calculated from the following:    Height as of this encounter: 1.695 m (5' 6.75\").    Weight as of this encounter: 113.4 kg (250 lb).    Physical Exam  GENERAL: alert and no distress  EYES: Eyes grossly normal to inspection, PERRL and conjunctivae and sclerae normal  HENT: ear canals and TM's normal, nose and mouth without ulcers or lesions  NECK: no adenopathy, no asymmetry, masses, or scars  RESP: lungs clear to auscultation - no rales, rhonchi or wheezes  CV: regular rate and rhythm, normal S1 S2, no S3 or S4, no murmur, click or rub, no peripheral edema  ABDOMEN: soft, nontender, no hepatosplenomegaly, no masses and bowel sounds normal  MS: no gross musculoskeletal defects noted, no edema  SKIN: no suspicious lesions or rashes  NEURO: Normal strength and tone, mentation intact and speech normal  PSYCH: mentation appears normal, affect normal/bright      Abd xray: moderate amt of stool present in right and left colon  Signed Electronically by: Cecile Soria MD    "

## 2024-04-24 NOTE — PATIENT INSTRUCTIONS
Preventive Care Advice   This is general advice given by our system to help you stay healthy. However, your care team may have specific advice just for you. Please talk to your care team about your preventive care needs.  Nutrition  Eat 5 or more servings of fruits and vegetables each day.  Try wheat bread, brown rice and whole grain pasta (instead of white bread, rice, and pasta).  Get enough calcium and vitamin D. Check the label on foods and aim for 100% of the RDA (recommended daily allowance).  Lifestyle  Exercise at least 150 minutes each week   (30 minutes a day, 5 days a week).  Do muscle strengthening activities 2 days a week. These help control your weight and prevent disease.  No smoking.  Wear sunscreen to prevent skin cancer.  Have a dental exam and cleaning every 6 months.  Yearly exams  See your health care team every year to talk about:  Any changes in your health.  Any medicines your care team has prescribed.  Preventive care, family planning, and ways to prevent chronic diseases.  Shots (vaccines)   HPV shots (up to age 26), if you've never had them before.  Hepatitis B shots (up to age 59), if you've never had them before.  COVID-19 shot: Get this shot when it's due.  Flu shot: Get a flu shot every year.  Tetanus shot: Get a tetanus shot every 10 years.  Pneumococcal, hepatitis A, and RSV shots: Ask your care team if you need these based on your risk.  Shingles shot (for age 50 and up).  General health tests  Diabetes screening:  Starting at age 35, Get screened for diabetes at least every 3 years.  If you are younger than age 35, ask your care team if you should be screened for diabetes.  Cholesterol test: At age 39, start having a cholesterol test every 5 years, or more often if advised.  Bone density scan (DEXA): At age 50, ask your care team if you should have this scan for osteoporosis (brittle bones).  Hepatitis C: Get tested at least once in your life.  STIs (sexually transmitted  infections)  Before age 24: Ask your care team if you should be screened for STIs.  After age 24: Get screened for STIs if you're at risk. You are at risk for STIs (including HIV) if:  You are sexually active with more than one person.  You don't use condoms every time.  You or a partner was diagnosed with a sexually transmitted infection.  If you are at risk for HIV, ask about PrEP medicine to prevent HIV.  Get tested for HIV at least once in your life, whether you are at risk for HIV or not.  Cancer screening tests  Cervical cancer screening: If you have a cervix, begin getting regular cervical cancer screening tests at age 21. Most people who have regular screenings with normal results can stop after age 65. Talk about this with your provider.  Breast cancer scan (mammogram): If you've ever had breasts, begin having regular mammograms starting at age 40. This is a scan to check for breast cancer.  Colon cancer screening: It is important to start screening for colon cancer at age 45.  Have a colonoscopy test every 10 years (or more often if you're at risk) Or, ask your provider about stool tests like a FIT test every year or Cologuard test every 3 years.  To learn more about your testing options, visit: https://www.[employer]/598418.pdf.  For help making a decision, visit: https://bit.ly/yy00530.  Prostate cancer screening test: If you have a prostate and are age 55 to 69, ask your provider if you would benefit from a yearly prostate cancer screening test.  Lung cancer screening: If you are a current or former smoker age 50 to 80, ask your care team if ongoing lung cancer screenings are right for you.  For informational purposes only. Not to replace the advice of your health care provider. Copyright   2023 [employer]. All rights reserved. Clinically reviewed by the [facility] Transitions Program. [employer] [id number] - REV 01/24.

## 2024-04-25 ENCOUNTER — LAB (OUTPATIENT)
Dept: LAB | Facility: CLINIC | Age: 59
End: 2024-04-25
Payer: COMMERCIAL

## 2024-04-25 DIAGNOSIS — Z90.3 H/O GASTRIC SLEEVE: ICD-10-CM

## 2024-04-25 DIAGNOSIS — E11.9 TYPE 2 DIABETES MELLITUS WITHOUT COMPLICATION, WITHOUT LONG-TERM CURRENT USE OF INSULIN (H): ICD-10-CM

## 2024-04-25 DIAGNOSIS — N39.0 URINARY TRACT INFECTION WITH HEMATURIA, SITE UNSPECIFIED: ICD-10-CM

## 2024-04-25 DIAGNOSIS — Z13.220 SCREENING CHOLESTEROL LEVEL: ICD-10-CM

## 2024-04-25 DIAGNOSIS — R31.9 URINARY TRACT INFECTION WITH HEMATURIA, SITE UNSPECIFIED: ICD-10-CM

## 2024-04-25 DIAGNOSIS — R10.9 ABDOMINAL PAIN, UNSPECIFIED ABDOMINAL LOCATION: ICD-10-CM

## 2024-04-25 DIAGNOSIS — D49.7 PITUITARY TUMOR: ICD-10-CM

## 2024-04-25 LAB
ALBUMIN SERPL BCG-MCNC: 4.4 G/DL (ref 3.5–5.2)
ALBUMIN UR-MCNC: NEGATIVE MG/DL
ALP SERPL-CCNC: 91 U/L (ref 40–150)
ALT SERPL W P-5'-P-CCNC: 23 U/L (ref 0–50)
ANION GAP SERPL CALCULATED.3IONS-SCNC: 10 MMOL/L (ref 7–15)
APPEARANCE UR: CLEAR
AST SERPL W P-5'-P-CCNC: 25 U/L (ref 0–45)
BILIRUB SERPL-MCNC: 0.5 MG/DL
BILIRUB UR QL STRIP: NEGATIVE
BUN SERPL-MCNC: 16 MG/DL (ref 8–23)
CALCIUM SERPL-MCNC: 9.4 MG/DL (ref 8.6–10)
CHLORIDE SERPL-SCNC: 103 MMOL/L (ref 98–107)
CHOLEST SERPL-MCNC: 198 MG/DL
COLOR UR AUTO: YELLOW
CREAT SERPL-MCNC: 0.76 MG/DL (ref 0.51–0.95)
DEPRECATED HCO3 PLAS-SCNC: 27 MMOL/L (ref 22–29)
EGFRCR SERPLBLD CKD-EPI 2021: 90 ML/MIN/1.73M2
FASTING STATUS PATIENT QL REPORTED: YES
FOLATE SERPL-MCNC: 8.6 NG/ML (ref 4.6–34.8)
GLUCOSE SERPL-MCNC: 92 MG/DL (ref 70–99)
GLUCOSE UR STRIP-MCNC: NEGATIVE MG/DL
HBA1C MFR BLD: 5.5 % (ref 0–5.6)
HDLC SERPL-MCNC: 58 MG/DL
HGB UR QL STRIP: NEGATIVE
KETONES UR STRIP-MCNC: ABNORMAL MG/DL
LDLC SERPL CALC-MCNC: 115 MG/DL
LEUKOCYTE ESTERASE UR QL STRIP: NEGATIVE
LIPASE SERPL-CCNC: 42 U/L (ref 13–60)
NITRATE UR QL: NEGATIVE
NONHDLC SERPL-MCNC: 140 MG/DL
PH UR STRIP: 5.5 [PH] (ref 5–7)
POTASSIUM SERPL-SCNC: 4.1 MMOL/L (ref 3.4–5.3)
PROLACTIN SERPL 3RD IS-MCNC: 11 NG/ML (ref 5–23)
PROT SERPL-MCNC: 7.1 G/DL (ref 6.4–8.3)
SODIUM SERPL-SCNC: 140 MMOL/L (ref 135–145)
SP GR UR STRIP: 1.02 (ref 1–1.03)
TRIGL SERPL-MCNC: 123 MG/DL
UROBILINOGEN UR STRIP-ACNC: 0.2 E.U./DL
VIT B12 SERPL-MCNC: 2115 PG/ML (ref 232–1245)

## 2024-04-25 PROCEDURE — 82746 ASSAY OF FOLIC ACID SERUM: CPT

## 2024-04-25 PROCEDURE — 83690 ASSAY OF LIPASE: CPT

## 2024-04-25 PROCEDURE — 80053 COMPREHEN METABOLIC PANEL: CPT

## 2024-04-25 PROCEDURE — 81003 URINALYSIS AUTO W/O SCOPE: CPT

## 2024-04-25 PROCEDURE — 87338 HPYLORI STOOL AG IA: CPT

## 2024-04-25 PROCEDURE — 80061 LIPID PANEL: CPT

## 2024-04-25 PROCEDURE — 83036 HEMOGLOBIN GLYCOSYLATED A1C: CPT

## 2024-04-25 PROCEDURE — 82607 VITAMIN B-12: CPT

## 2024-04-25 PROCEDURE — 36415 COLL VENOUS BLD VENIPUNCTURE: CPT

## 2024-04-25 PROCEDURE — 84146 ASSAY OF PROLACTIN: CPT

## 2024-04-25 NOTE — RESULT ENCOUNTER NOTE
Michele Hay,  You have done an x-ray did show a small to moderate amount of stool in the colon but was otherwise normal.  I would encourage you to trial some MiraLAX for the next few days to see if this makes an impact on your symptoms.  You can certainly continue with the Linzess also.  I will wait to see the final lab results before deciding on the Mounjaro dosing  Kind regards,  Cecile Soria MD

## 2024-04-26 DIAGNOSIS — E11.9 TYPE 2 DIABETES MELLITUS WITHOUT COMPLICATION, WITHOUT LONG-TERM CURRENT USE OF INSULIN (H): Primary | ICD-10-CM

## 2024-04-26 LAB — H PYLORI AG STL QL IA: NEGATIVE

## 2024-04-26 NOTE — RESULT ENCOUNTER NOTE
Juliann,  It was a pleasure to see you in the office recently.   - kidney, liver and electrolyte panel and pancreas test (lipase) were all normal/negative.   - the H pylori stool test was negative (ulcer bacteria test)  - the folate and B12 vitamin levels were normal (b12 level is expected to be high with supplementation).   - the urinalysis shows no ongoing signs of infection.   - Your cholesterol panel shows the LDL (bad cholesterol) has jumped up. Have you been taking the atorvastatin regularly? If so, we may want to push up the dose a bit to get the LDL (bad cholesterol) back under 100.   - Your A1c continues to show excellent control of your glucose and your average glucose level is now back into the normal range.     No cause for your recent abdominal pain was seen on the labs. So, I would suggest more aggressively treating the constipation as mention in my previous message. Let's also have you drop the mounjaro dose down to 7.5 mg daily.     If symptoms don't improve with above (or worsen) would recommend proceeding with an upper endoscopy and CT scan of your abdomen. So, please keep me posted. I'll send in the new Rx for the Mounjaro.     Please MyChart or call if you have any concerns or questions.   Sincerely,  Cecile Soria MD

## 2024-05-02 ENCOUNTER — MYC REFILL (OUTPATIENT)
Dept: PHARMACY | Facility: CLINIC | Age: 59
End: 2024-05-02
Payer: COMMERCIAL

## 2024-05-02 ENCOUNTER — TELEPHONE (OUTPATIENT)
Dept: FAMILY MEDICINE | Facility: CLINIC | Age: 59
End: 2024-05-02
Payer: COMMERCIAL

## 2024-05-02 ENCOUNTER — MYC REFILL (OUTPATIENT)
Dept: FAMILY MEDICINE | Facility: CLINIC | Age: 59
End: 2024-05-02
Payer: COMMERCIAL

## 2024-05-02 DIAGNOSIS — E11.9 TYPE 2 DIABETES MELLITUS WITHOUT COMPLICATION, WITHOUT LONG-TERM CURRENT USE OF INSULIN (H): ICD-10-CM

## 2024-05-02 DIAGNOSIS — E66.01 MORBID OBESITY (H): ICD-10-CM

## 2024-05-02 DIAGNOSIS — E11.9 TYPE 2 DIABETES MELLITUS WITHOUT COMPLICATION, WITHOUT LONG-TERM CURRENT USE OF INSULIN (H): Primary | ICD-10-CM

## 2024-05-02 RX ORDER — TIRZEPATIDE 10 MG/.5ML
10 INJECTION, SOLUTION SUBCUTANEOUS
Qty: 6 ML | Refills: 1 | Status: CANCELLED | OUTPATIENT
Start: 2024-05-02

## 2024-05-02 NOTE — TELEPHONE ENCOUNTER
Pt is requesting new rx for    Mounjaro 5mg/0.5ml sopn    Did not see on active med list please verify and send new rx. Thank you!    [facility]  [phone]

## 2024-06-30 DIAGNOSIS — F33.1 MODERATE EPISODE OF RECURRENT MAJOR DEPRESSIVE DISORDER (H): ICD-10-CM

## 2024-07-01 RX ORDER — SERTRALINE HYDROCHLORIDE 100 MG/1
100 TABLET, FILM COATED ORAL DAILY
Qty: 90 TABLET | Refills: 0 | Status: SHIPPED | OUTPATIENT
Start: 2024-07-01

## 2024-07-11 ENCOUNTER — NURSE TRIAGE (OUTPATIENT)
Dept: FAMILY MEDICINE | Facility: CLINIC | Age: 59
End: 2024-07-11
Payer: COMMERCIAL

## 2024-07-11 NOTE — TELEPHONE ENCOUNTER
Patient called to follow up on [employer] message sent today, 7/11/24.     She reports having bloating, fatigue, pelvic pain, hesitancy.    Last had a UTI 4/5/24.     She denies fever, flank pain, blood in urine, retention, frequency, foul smelling urine.    RN advised she could start an e-visit today. She prefers to come in to the clinic at [facility]. RN scheduled patient for only available visit tomorrow with Dr. Beltran at 3:10 pm.     RN reviewed ED/UC precautions. She verbalized understanding and denies further questions or concerns at this time.    Yael Wood, RN, BSN, PHN  [facility]  Nurse Triage, [facility]    Reason for Disposition   Patient wants to be seen    Additional Information   Negative: Shock suspected (e.g., cold/pale/clammy skin, too weak to stand, low BP, rapid pulse)   Negative: Sounds like a life-threatening emergency to the triager   Negative: Followed a female genital area injury (e.g., labia, vagina, vulva)   Negative: Followed a male genital area injury (penis, scrotum)   Negative: Vaginal discharge   Negative: Pus (white, yellow) or bloody discharge from end of penis   Negative: Pain or burning with passing urine (urination) and pregnant   Negative: Pain or burning with passing urine (urination) and female   Negative: Pain or burning with passing urine (urination) and male   Negative: Pain or itching in the vulvar area   Negative: Pain in scrotum is main symptom   Negative: Blood in the urine is main symptom   Negative: Symptoms arising from use of a urinary catheter (e.g., Coude, Metz)   Negative: Unable to urinate (or only a few drops) > 4 hours and bladder feels very full (e.g., palpable bladder or strong urge to urinate)   Negative: Decreased urination and drinking very little and dehydration suspected (e.g., dark urine, no urine > 12 hours, very dry mouth, very lightheaded)   Negative: Patient sounds very sick or weak to the triager   Negative:  Fever > 100.4 F  (38.0 C)   Negative: Side (flank) or lower back pain present   Negative: Bad or foul-smelling urine   Negative: Urinating more frequently than usual (i.e., frequency)   Negative: Can't control passage of urine (i.e., urinary incontinence) and new-onset (< 2 weeks) or worsening    Protocols used: Urinary Symptoms-A-OH

## 2024-07-12 ENCOUNTER — OFFICE VISIT (OUTPATIENT)
Dept: FAMILY MEDICINE | Facility: CLINIC | Age: 59
End: 2024-07-12
Payer: COMMERCIAL

## 2024-07-12 VITALS
OXYGEN SATURATION: 98 % | SYSTOLIC BLOOD PRESSURE: 119 MMHG | HEIGHT: 67 IN | TEMPERATURE: 97.9 F | BODY MASS INDEX: 40.12 KG/M2 | RESPIRATION RATE: 17 BRPM | DIASTOLIC BLOOD PRESSURE: 80 MMHG | HEART RATE: 80 BPM | WEIGHT: 255.6 LBS

## 2024-07-12 DIAGNOSIS — R14.0 ABDOMINAL BLOATING: Primary | ICD-10-CM

## 2024-07-12 DIAGNOSIS — E11.9 TYPE 2 DIABETES MELLITUS WITHOUT COMPLICATION, WITHOUT LONG-TERM CURRENT USE OF INSULIN (H): ICD-10-CM

## 2024-07-12 LAB
ALBUMIN UR-MCNC: NEGATIVE MG/DL
APPEARANCE UR: CLEAR
BILIRUB UR QL STRIP: NEGATIVE
COLOR UR AUTO: YELLOW
GLUCOSE UR STRIP-MCNC: NEGATIVE MG/DL
HGB UR QL STRIP: NEGATIVE
KETONES UR STRIP-MCNC: NEGATIVE MG/DL
LEUKOCYTE ESTERASE UR QL STRIP: NEGATIVE
MUCOUS THREADS #/AREA URNS LPF: PRESENT /LPF
NITRATE UR QL: NEGATIVE
PH UR STRIP: 6 [PH] (ref 5–7)
RBC #/AREA URNS AUTO: ABNORMAL /HPF
SP GR UR STRIP: 1.02 (ref 1–1.03)
SQUAMOUS #/AREA URNS AUTO: ABNORMAL /LPF
UROBILINOGEN UR STRIP-ACNC: 0.2 E.U./DL
WBC #/AREA URNS AUTO: ABNORMAL /HPF

## 2024-07-12 PROCEDURE — 99213 OFFICE O/P EST LOW 20 MIN: CPT | Performed by: INTERNAL MEDICINE

## 2024-07-12 PROCEDURE — 81001 URINALYSIS AUTO W/SCOPE: CPT | Performed by: INTERNAL MEDICINE

## 2024-07-12 PROCEDURE — G2211 COMPLEX E/M VISIT ADD ON: HCPCS | Performed by: INTERNAL MEDICINE

## 2024-07-12 NOTE — PROGRESS NOTES
Assessment & Plan     Satnam was seen today for uti.    Diagnoses and all orders for this visit:    Abdominal bloating  -     UA with Microscopic reflex to Culture - lab collect; Future  -     UA with Microscopic reflex to Culture - lab collect  -     UA Microscopic with Reflex to Culture    Type 2 diabetes mellitus without complication, without long-term current use of insulin (H)  Comments:  Possible side effects from Mounjaro    Other orders  -     REVIEW OF HEALTH MAINTENANCE PROTOCOL ORDERS      Urine test not suggestive of infection.  Per patient report, Mounjaro and irritable bowel symptoms and stress can also be contributed to her current symptoms.  Patient will monitor symptoms, follow-up with PCP if symptoms get worse.          Subjective   Satnam is a 58 year old, presenting for the following health issues:  UTI        7/12/2024     3:12 PM   Additional Questions   Roomed by Mckayla     Via the Health Maintenance questionnaire, the patient has reported the following services have been completed , this information has been sent to the abstraction team.    New patient to this provider.  History of diabetes irritable bowel syndrome.  Noted some pelvic bloating and slow urine flow.  Had UTI in early April, treated with antibiotic.  Also has history of irritable bowel with some of the bloating discomfort.  There is also bloating symptoms associated with Mounjaro.  Has recent dose reduction with some improvement of bloating.  She is under a lot of stress.  Which can sometimes cause similar type of feelings.  Has some low back discomfort, but has also done some lifting which contributed to this  She mainly wants to rule out UTI.    History of Present Illness       Reason for visit:  Possible uti  Symptom onset:  1-3 days ago  Symptoms include:  Pelvic pain Bloating slow urine flow  Symptom intensity:  Severe  Symptom progression:  Worsening  Had these symptoms before:  Yes  Has tried/received treatment for these  "symptoms:  Yes  Previous treatment was successful:  Yes  Prior treatment description:  Antibiotics    She eats 2-3 servings of fruits and vegetables daily.She consumes 0 sweetened beverage(s) daily.She exercises with enough effort to increase her heart rate 20 to 29 minutes per day.  She exercises with enough effort to increase her heart rate 3 or less days per week.   She is taking medications regularly.      Review of Systems  Constitutional, HEENT, cardiovascular, pulmonary, gi and gu systems are negative, except as otherwise noted.      Objective    /80 (BP Location: Right arm, Patient Position: Sitting, Cuff Size: Adult Large)   Pulse 80   Temp 97.9  F (36.6  C) (Oral)   Resp 17   Ht 1.702 m (5' 7\")   Wt 115.9 kg (255 lb 9.6 oz)   LMP 06/06/2015   SpO2 98%   BMI 40.03 kg/m    Body mass index is 40.03 kg/m .  Physical Exam   GENERAL: alert and no distress    Results for orders placed or performed in visit on 07/12/24   UA with Microscopic reflex to Culture - lab collect     Status: Normal    Specimen: Urine, Midstream   Result Value Ref Range    Color Urine Yellow Colorless, Straw, Light Yellow, Yellow    Appearance Urine Clear Clear    Glucose Urine Negative Negative mg/dL    Bilirubin Urine Negative Negative    Ketones Urine Negative Negative mg/dL    Specific Gravity Urine 1.020 1.003 - 1.035    Blood Urine Negative Negative    pH Urine 6.0 5.0 - 7.0    Protein Albumin Urine Negative Negative mg/dL    Urobilinogen Urine 0.2 0.2, 1.0 E.U./dL    Nitrite Urine Negative Negative    Leukocyte Esterase Urine Negative Negative   UA Microscopic with Reflex to Culture     Status: Abnormal   Result Value Ref Range    RBC Urine None Seen 0-2 /HPF /HPF    WBC Urine None Seen 0-5 /HPF /HPF    Squamous Epithelials Urine Moderate (A) None Seen /LPF    Mucus Urine Present (A) None Seen /LPF    Narrative    Urine Culture not indicated             Signed Electronically by: Jairo Beltran MD PhD    "

## 2024-08-06 ENCOUNTER — VIRTUAL VISIT (OUTPATIENT)
Dept: PHARMACY | Facility: CLINIC | Age: 59
End: 2024-08-06
Attending: FAMILY MEDICINE
Payer: COMMERCIAL

## 2024-08-06 DIAGNOSIS — E66.01 MORBID OBESITY (H): ICD-10-CM

## 2024-08-06 DIAGNOSIS — E11.9 TYPE 2 DIABETES MELLITUS WITHOUT COMPLICATION, WITHOUT LONG-TERM CURRENT USE OF INSULIN (H): Primary | ICD-10-CM

## 2024-08-06 PROCEDURE — 99207 PR NO CHARGE LOS: CPT | Mod: 93 | Performed by: PHARMACIST

## 2024-08-06 NOTE — PROGRESS NOTES
Medication Therapy Management (MTM) Encounter    ASSESSMENT:                            Medication Adherence/Access: No issues identified    Diabetes/Obesity:   Stable.  Patient is meeting A1c goal of < 7% but Mounjaro dose has been reduced since that time.  Plan in place to re-check in October.    May benefit from checking blood sugar to ensure good control.  Weight loss has plateaued, is not tolerating higher doses of Mounjaro so may benefit from solidifying lifestyle changes at this time.    PLAN:                            Continue current medication regimen.   Recommended checking blood sugar again and to let me know if fasting readings are >130mg/dL.  Recommended focusing on adding activity as she's able.  Plan in place to re-check A1c in October.     Follow-up: Return in about 3 months (around 10/31/2024) for Physical Exam, Lab Work with Dr. Soria.    SUBJECTIVE/OBJECTIVE:                          Satnam Mitchell is a 58 year old female seen for a follow-up visit.       Reason for visit: Routine follow-up.  She has primary care physician follow-up scheduled 10/31.    Tobacco: She reports that she has never smoked. She has never been exposed to tobacco smoke. She has never used smokeless tobacco.  Alcohol: occasionally    Medication Adherence/Access: no issues reported    Diabetes /Obesity:    Mounjaro 7.5mg weekly - dose has been reduced due to constipation and GI upset/bloating  Aspirin 81mg daily  Has glucose tablets available, but has not needed to use  Blood sugar monitoring: Never - she hasn't been using continuous glucose monitor recently  Current diabetes symptoms: none  Diet/exercise: Weight has plateaued with lower Mounjaro dose.  Diet has been pretty stable, has been getting less activity this summer.  Feels she's gaining and losing the same 5 pounds.   Eye exam in the last 12 months? No - plans to schedule  Foot exam is up to date  Lab Results   Component Value Date    A1C 5.5 04/25/2024     A1C 5.5 10/11/2023     Wt Readings from Last 4 Encounters:   07/12/24 255 lb 9.6 oz (115.9 kg)   04/24/24 250 lb (113.4 kg)   10/11/23 258 lb 3.2 oz (117.1 kg)   04/10/23 278 lb 8 oz (126.3 kg)      Today's Vitals: LMP 06/06/2015   ----------------    I spent 11 minutes with this patient today. A copy of the visit note was provided to the patient's provider(s).    A summary of these recommendations was sent via [employer].    Selena Goldstein, PharmD, [facility]  Medication Therapy Management Provider  [phone]     Telemedicine Visit Details  Type of service:  Telephone visit  Start Time: 11:03 AM  End Time: 11:14 AM     Medication Therapy Recommendations  Type 2 diabetes mellitus without complication, without long-term current use of insulin (H)    Current Medication: Continuous Blood Gluc Sensor (FREESTYLE CLAIR 2 SENSOR) MISC   Rationale: Medication requires monitoring - Needs additional monitoring - Effectiveness   Recommendation: Self-Monitoring   Status: Patient Agreed - Adherence/Education

## 2024-08-06 NOTE — PATIENT INSTRUCTIONS
"Recommendations from today's MTM visit:                                                    MTM (medication therapy management) is a service provided by a clinical pharmacist designed to help you get the most of out of your medicines.   Today we reviewed what your medicines are for, how to know if they are working, that your medicines are safe and how to make your medicine regimen as easy as possible.      Continue current medication regimen.   Recommended checking blood sugar again and to let me know if fasting readings are >130mg/dL.  Recommended focusing on adding activity as you're able.  Plan in place to re-check A1c in October.     Follow-up: Return in about 3 months (around 10/31/2024) for Physical Exam, Lab Work with Dr. Soria.    It was great speaking with you today.  I value your experience and would be very thankful for your time in providing feedback in our clinic survey. In the next few days, you may receive an email or text message from [employer] with a link to a survey related to your  clinical pharmacist.\"     To schedule another MTM appointment, please call the clinic directly or you may call the MTM scheduling line at [phone] or toll-free at [phone].     My Clinical Pharmacist's contact information:                                                      Please feel free to contact me with any questions or concerns you have.      Selena Goldstein, PharmD, [facility]  Medication Therapy Management Provider  [phone]    "

## 2024-08-07 ENCOUNTER — MYC REFILL (OUTPATIENT)
Dept: FAMILY MEDICINE | Facility: CLINIC | Age: 59
End: 2024-08-07
Payer: COMMERCIAL

## 2024-08-07 DIAGNOSIS — E11.9 TYPE 2 DIABETES MELLITUS WITHOUT COMPLICATION, WITHOUT LONG-TERM CURRENT USE OF INSULIN (H): ICD-10-CM

## 2024-08-09 ENCOUNTER — TRANSFERRED RECORDS (OUTPATIENT)
Dept: HEALTH INFORMATION MANAGEMENT | Facility: CLINIC | Age: 59
End: 2024-08-09
Payer: COMMERCIAL

## 2024-08-26 ENCOUNTER — MYC REFILL (OUTPATIENT)
Dept: FAMILY MEDICINE | Facility: CLINIC | Age: 59
End: 2024-08-26
Payer: COMMERCIAL

## 2024-08-26 DIAGNOSIS — F33.1 MODERATE EPISODE OF RECURRENT MAJOR DEPRESSIVE DISORDER (H): ICD-10-CM

## 2024-08-26 DIAGNOSIS — K21.9 CHRONIC GERD: ICD-10-CM

## 2024-08-27 RX ORDER — PANTOPRAZOLE SODIUM 40 MG/1
40 TABLET, DELAYED RELEASE ORAL DAILY
Qty: 90 TABLET | Refills: 1 | OUTPATIENT
Start: 2024-08-27

## 2024-08-27 RX ORDER — SERTRALINE HYDROCHLORIDE 100 MG/1
100 TABLET, FILM COATED ORAL DAILY
Qty: 90 TABLET | Refills: 0 | OUTPATIENT
Start: 2024-08-27

## 2024-10-30 SDOH — HEALTH STABILITY: PHYSICAL HEALTH: ON AVERAGE, HOW MANY DAYS PER WEEK DO YOU ENGAGE IN MODERATE TO STRENUOUS EXERCISE (LIKE A BRISK WALK)?: 3 DAYS

## 2024-10-30 SDOH — HEALTH STABILITY: PHYSICAL HEALTH: ON AVERAGE, HOW MANY MINUTES DO YOU ENGAGE IN EXERCISE AT THIS LEVEL?: 30 MIN

## 2024-10-30 ASSESSMENT — PATIENT HEALTH QUESTIONNAIRE - PHQ9
SUM OF ALL RESPONSES TO PHQ QUESTIONS 1-9: 2
SUM OF ALL RESPONSES TO PHQ QUESTIONS 1-9: 2
10. IF YOU CHECKED OFF ANY PROBLEMS, HOW DIFFICULT HAVE THESE PROBLEMS MADE IT FOR YOU TO DO YOUR WORK, TAKE CARE OF THINGS AT HOME, OR GET ALONG WITH OTHER PEOPLE: NOT DIFFICULT AT ALL

## 2024-10-30 ASSESSMENT — SOCIAL DETERMINANTS OF HEALTH (SDOH): HOW OFTEN DO YOU GET TOGETHER WITH FRIENDS OR RELATIVES?: ONCE A WEEK

## 2024-10-31 ENCOUNTER — OFFICE VISIT (OUTPATIENT)
Dept: FAMILY MEDICINE | Facility: CLINIC | Age: 59
End: 2024-10-31
Attending: FAMILY MEDICINE
Payer: COMMERCIAL

## 2024-10-31 VITALS
RESPIRATION RATE: 16 BRPM | WEIGHT: 262.06 LBS | SYSTOLIC BLOOD PRESSURE: 121 MMHG | TEMPERATURE: 97.2 F | HEART RATE: 65 BPM | HEIGHT: 67 IN | OXYGEN SATURATION: 98 % | BODY MASS INDEX: 41.13 KG/M2 | DIASTOLIC BLOOD PRESSURE: 82 MMHG

## 2024-10-31 DIAGNOSIS — E11.9 TYPE 2 DIABETES MELLITUS WITHOUT COMPLICATION, WITHOUT LONG-TERM CURRENT USE OF INSULIN (H): ICD-10-CM

## 2024-10-31 DIAGNOSIS — E55.9 VITAMIN D DEFICIENCY: ICD-10-CM

## 2024-10-31 DIAGNOSIS — Z90.3 H/O GASTRIC SLEEVE: ICD-10-CM

## 2024-10-31 DIAGNOSIS — E66.01 MORBID OBESITY (H): ICD-10-CM

## 2024-10-31 DIAGNOSIS — Z12.11 SCREEN FOR COLON CANCER: ICD-10-CM

## 2024-10-31 DIAGNOSIS — E28.39 ESTROGEN DEFICIENCY: ICD-10-CM

## 2024-10-31 DIAGNOSIS — F33.1 MODERATE EPISODE OF RECURRENT MAJOR DEPRESSIVE DISORDER (H): ICD-10-CM

## 2024-10-31 DIAGNOSIS — Z00.00 ROUTINE GENERAL MEDICAL EXAMINATION AT A HEALTH CARE FACILITY: Primary | ICD-10-CM

## 2024-10-31 DIAGNOSIS — E78.5 HYPERLIPIDEMIA LDL GOAL <100: ICD-10-CM

## 2024-10-31 DIAGNOSIS — K21.9 CHRONIC GERD: ICD-10-CM

## 2024-10-31 DIAGNOSIS — Z12.31 ENCOUNTER FOR SCREENING MAMMOGRAM FOR BREAST CANCER: ICD-10-CM

## 2024-10-31 DIAGNOSIS — D64.9 ANEMIA, UNSPECIFIED TYPE: ICD-10-CM

## 2024-10-31 LAB
BASOPHILS # BLD AUTO: 0 10E3/UL (ref 0–0.2)
BASOPHILS NFR BLD AUTO: 0 %
EOSINOPHIL # BLD AUTO: 0.1 10E3/UL (ref 0–0.7)
EOSINOPHIL NFR BLD AUTO: 1 %
ERYTHROCYTE [DISTWIDTH] IN BLOOD BY AUTOMATED COUNT: 12.6 % (ref 10–15)
EST. AVERAGE GLUCOSE BLD GHB EST-MCNC: 111 MG/DL
HBA1C MFR BLD: 5.5 % (ref 0–5.6)
HCT VFR BLD AUTO: 40.6 % (ref 35–47)
HGB BLD-MCNC: 13.6 G/DL (ref 11.7–15.7)
IMM GRANULOCYTES # BLD: 0 10E3/UL
IMM GRANULOCYTES NFR BLD: 0 %
LYMPHOCYTES # BLD AUTO: 3.1 10E3/UL (ref 0.8–5.3)
LYMPHOCYTES NFR BLD AUTO: 45 %
MCH RBC QN AUTO: 29.2 PG (ref 26.5–33)
MCHC RBC AUTO-ENTMCNC: 33.5 G/DL (ref 31.5–36.5)
MCV RBC AUTO: 87 FL (ref 78–100)
MONOCYTES # BLD AUTO: 0.4 10E3/UL (ref 0–1.3)
MONOCYTES NFR BLD AUTO: 6 %
NEUTROPHILS # BLD AUTO: 3.3 10E3/UL (ref 1.6–8.3)
NEUTROPHILS NFR BLD AUTO: 48 %
PLATELET # BLD AUTO: 185 10E3/UL (ref 150–450)
RBC # BLD AUTO: 4.65 10E6/UL (ref 3.8–5.2)
WBC # BLD AUTO: 6.9 10E3/UL (ref 4–11)

## 2024-10-31 PROCEDURE — 82570 ASSAY OF URINE CREATININE: CPT | Performed by: FAMILY MEDICINE

## 2024-10-31 PROCEDURE — 90673 RIV3 VACCINE NO PRESERV IM: CPT | Performed by: FAMILY MEDICINE

## 2024-10-31 PROCEDURE — 90746 HEPB VACCINE 3 DOSE ADULT IM: CPT | Performed by: FAMILY MEDICINE

## 2024-10-31 PROCEDURE — 90471 IMMUNIZATION ADMIN: CPT | Performed by: FAMILY MEDICINE

## 2024-10-31 PROCEDURE — 82306 VITAMIN D 25 HYDROXY: CPT | Performed by: FAMILY MEDICINE

## 2024-10-31 PROCEDURE — 82043 UR ALBUMIN QUANTITATIVE: CPT | Performed by: FAMILY MEDICINE

## 2024-10-31 PROCEDURE — 82728 ASSAY OF FERRITIN: CPT | Performed by: FAMILY MEDICINE

## 2024-10-31 PROCEDURE — 90472 IMMUNIZATION ADMIN EACH ADD: CPT | Performed by: FAMILY MEDICINE

## 2024-10-31 PROCEDURE — 99396 PREV VISIT EST AGE 40-64: CPT | Mod: 25 | Performed by: FAMILY MEDICINE

## 2024-10-31 PROCEDURE — 90480 ADMN SARSCOV2 VAC 1/ONLY CMP: CPT | Performed by: FAMILY MEDICINE

## 2024-10-31 PROCEDURE — 83036 HEMOGLOBIN GLYCOSYLATED A1C: CPT | Performed by: FAMILY MEDICINE

## 2024-10-31 PROCEDURE — 91320 SARSCV2 VAC 30MCG TRS-SUC IM: CPT | Performed by: FAMILY MEDICINE

## 2024-10-31 PROCEDURE — 84443 ASSAY THYROID STIM HORMONE: CPT | Performed by: FAMILY MEDICINE

## 2024-10-31 PROCEDURE — 36415 COLL VENOUS BLD VENIPUNCTURE: CPT | Performed by: FAMILY MEDICINE

## 2024-10-31 PROCEDURE — 85025 COMPLETE CBC W/AUTO DIFF WBC: CPT | Performed by: FAMILY MEDICINE

## 2024-10-31 PROCEDURE — 99214 OFFICE O/P EST MOD 30 MIN: CPT | Mod: 25 | Performed by: FAMILY MEDICINE

## 2024-10-31 PROCEDURE — 80048 BASIC METABOLIC PNL TOTAL CA: CPT | Performed by: FAMILY MEDICINE

## 2024-10-31 RX ORDER — TIRZEPATIDE 10 MG/.5ML
10 INJECTION, SOLUTION SUBCUTANEOUS
Qty: 2 ML | Refills: 1 | Status: SHIPPED | OUTPATIENT
Start: 2024-10-31

## 2024-10-31 RX ORDER — ATORVASTATIN CALCIUM 20 MG/1
20 TABLET, FILM COATED ORAL DAILY
Qty: 90 TABLET | Refills: 3 | Status: SHIPPED | OUTPATIENT
Start: 2024-10-31

## 2024-10-31 RX ORDER — PANTOPRAZOLE SODIUM 40 MG/1
40 TABLET, DELAYED RELEASE ORAL DAILY
Qty: 90 TABLET | Refills: 3 | Status: SHIPPED | OUTPATIENT
Start: 2024-10-31

## 2024-10-31 RX ORDER — SERTRALINE HYDROCHLORIDE 100 MG/1
100 TABLET, FILM COATED ORAL DAILY
Qty: 90 TABLET | Refills: 3 | Status: SHIPPED | OUTPATIENT
Start: 2024-10-31

## 2024-10-31 ASSESSMENT — PAIN SCALES - GENERAL: PAINLEVEL_OUTOF10: NO PAIN (0)

## 2024-10-31 NOTE — PROGRESS NOTES
Preventive Care Visit  [facility] MIKAELA Huber Lucia Soria MD, Family Medicine  Oct 31, 2024      Assessment & Plan     Routine general medical examination at a health care facility  Covid/flu/hepB vaccines given today  She does follow with dermatology and is due to schedule her follow-up visit.    Type 2 diabetes mellitus without complication, without long-term current use of insulin (H)  Morbid Obesity  Has been under excellent control on Mounjaro.  Continue aspirin, statin.  Is not yet on ARB/ACE given blood pressure has remained quite normal and no microalbuminuria and now normal range A1c's.  Consider though going forward especially if blood pressure trends up.  For weight management we will cautiously increase Mounjaro again up to 10 mg to see if she can tolerate now that she is on Linzess.  She will update me if any significant concerns.  - Albumin Random Urine Quantitative with Creat Ratio; Future  - HEMOGLOBIN A1C; Future  - BASIC METABOLIC PANEL; Future  - TSH WITH FREE T4 REFLEX; Future  - MOUNJARO 10 MG/0.5ML SOAJ; Inject 0.5 mLs (10 mg) subcutaneously every 7 days.  - Albumin Random Urine Quantitative with Creat Ratio  - HEMOGLOBIN A1C  - BASIC METABOLIC PANEL  - TSH WITH FREE T4 REFLEX    Anemia, unspecified type  History of.  S/p gastric bypass. No longer with regular menses  - Ferritin; Future  - CBC with Platelets & Differential; Future  - Ferritin  - CBC with Platelets & Differential    Chronic GERD  Occasional breakthrough symptoms on high-dose PPI.  We reviewed lifestyle measures to reduce this.  Certainly GLP-1 agonist likely contributing also.  May need to address further if persistent or worsening.  - pantoprazole (PROTONIX) 40 MG EC tablet; Take 1 tablet (40 mg) by mouth daily.    Vitamin D deficiency  Supplementing, s/p gastric bypass  - Vitamin D Deficiency; Future  - Vitamin D Deficiency    Moderate episode of recurrent major depressive disorder  "(H)  Well-controlled on sertraline.  Wanting to stay on current dosing  - sertraline (ZOLOFT) 100 MG tablet; Take 1 tablet (100 mg) by mouth daily.    Hyperlipidemia LDL goal <100  On statin, tolerating  - atorvastatin (LIPITOR) 20 MG tablet; Take 1 tablet (20 mg) by mouth daily.    Encounter for screening mammogram for breast cancer  - MA Screen Bilateral w/Long; Future    Estrogen deficiency  - DX Bone Density; Future    Screen for colon cancer  Does need every 5 year follow-up.  Last colonoscopy 2023.  Up-to-date            BMI  Estimated body mass index is 41.04 kg/m  as calculated from the following:    Height as of this encounter: 1.702 m (5' 7\").    Weight as of this encounter: 118.9 kg (262 lb 1 oz).   Weight management plan: Discussed healthy diet and exercise guidelines    Counseling  Appropriate preventive services were addressed with this patient via screening, questionnaire, or discussion as appropriate for fall prevention, nutrition, physical activity, Tobacco-use cessation, social engagement, weight loss and cognition.  Checklist reviewing preventive services available has been given to the patient.  Reviewed patient's diet, addressing concerns and/or questions.   She is at risk for lack of exercise and has been provided with information to increase physical activity for the benefit of her well-being.       See Patient Instructions    Madhuri Hay is a 58 year old, presenting for the following:  Annual Visit        10/31/2024     2:02 PM   Additional Questions   Roomed by Diane Lynne Schoenherr RN         10/31/2024   Forms   Any forms needing to be completed Yes             HPI    Mounjourno at 7.5 (down from 12.5 due to constipation)   On linzess now and doing well with constipation and would like to trial pushing up mounjaro dosing now again    Mood stable.     [facility] for pap    Pain in left buttock when sleeping on it.   Sometimes achey and sore all day.   Does not radiate further " down leg  Getting up and down from chair can also trigger.   No weakness, numbness, tingling. No bowel/bladder incont  As we talk determined likely caused by bed she is sleeping in a different bed and determines she doesn't need further work up of this.     Gerd 1 x per week. 2 cups coffee per day.     Health Care Directive  Patient does not have a Health Care Directive: Patient states has Advance Directive and will bring in a copy to clinic.       10/30/2024   General Health    Good    Not at all            10/30/2024   Nutrition   Three or more servings of calcium each day? Yes   Diet: Diabetic    Carbohydrate counting   How many servings of fruit and vegetables per day? (!) 2-3   How many sweetened beverages each day? 0-1       Multiple values from one day are sorted in reverse-chronological order         10/30/2024   Exercise   Days per week of moderate/strenous exercise 3 days   Average minutes spent exercising at this level 30 min    Walking for exercise.         10/30/2024   Social Factors   Frequency of gathering with friends or relatives Once a week   Worry food won't last until get money to buy more No   Food not last or not have enough money for food? No   Do you have housing? (Housing is defined as stable permanent housing and does not include staying ouside in a car, in a tent, in an abandoned building, in an overnight shelter, or couch-surfing.) Yes   Are you worried about losing your housing? No   Lack of transportation? No   Unable to get utilities (heat,electricity)? Yes   Want help with housing or utility concern? No      (!) FINANCIAL RESOURCE STRAIN CONCERN      10/30/2024   Fall Risk   Fallen 2 or more times in the past year? No    Trouble with walking or balance? No        Patient-reported          10/30/2024   Dental   Dentist two times every year? Yes            4/21/2024   TB Screening   Were you born outside of the [place]? No          Today's PHQ-9 Score:       10/30/2024     2:02 PM   PHQ-9  SCORE   PHQ-9 Total Score MyChart 2 (Minimal depression)   PHQ-9 Total Score 2        Patient-reported         10/30/2024   Substance Use   Alcohol more than 3/day or more than 7/wk No   Do you use any other substances recreationally? No        Social History     Tobacco Use    Smoking status: Never     Passive exposure: Never    Smokeless tobacco: Never   Vaping Use    Vaping status: Never Used   Substance Use Topics    Alcohol use: Yes     Comment: 1 drink per week    Drug use: No           6/13/2023   LAST FHS-7 RESULTS   1st degree relative breast or ovarian cancer No   Any relative bilateral breast cancer No   Any male have breast cancer No   Any ONE woman have BOTH breast AND ovarian cancer No   Any woman with breast cancer before 50yrs No   2 or more relatives with breast AND/OR ovarian cancer No   2 or more relatives with breast AND/OR bowel cancer No           Mammogram Screening - Mammogram every 1-2 years updated in Health Maintenance based on mutual decision making        10/30/2024   STI Screening   New sexual partner(s) since last STI/HIV test? No        History of abnormal Pap smear: No - age 30- 64 PAP with HPV every 5 years recommended        1/27/2022     3:31 PM 1/4/2017    12:00 AM   PAP / HPV   PAP-ABSTRACT See Scanned Document     See Scanned Document           This result is from an external source.     ASCVD Risk   The 10-year ASCVD risk score (Cameron SHAH, et al., 2019) is: 4.9%    Values used to calculate the score:      Age: 58 years      Sex: Female      Is Non- : No      Diabetic: Yes      Tobacco smoker: No      Systolic Blood Pressure: 121 mmHg      Is BP treated: No      HDL Cholesterol: 58 mg/dL      Total Cholesterol: 198 mg/dL           Reviewed and updated as needed this visit by Provider                          Review of Systems  Constitutional, neuro, ENT, endocrine, pulmonary, cardiac, gastrointestinal, genitourinary, musculoskeletal, integument  "and psychiatric systems are negative, except as otherwise noted.             Objective    Exam  /82 (BP Location: Right arm, Patient Position: Sitting, Cuff Size: Adult Large)   Pulse 65   Temp 97.2  F (36.2  C) (Temporal)   Resp 16   Ht 1.702 m (5' 7\")   Wt 118.9 kg (262 lb 1 oz)   LMP 06/06/2015   SpO2 98%   BMI 41.04 kg/m     Estimated body mass index is 41.04 kg/m  as calculated from the following:    Height as of this encounter: 1.702 m (5' 7\").    Weight as of this encounter: 118.9 kg (262 lb 1 oz).    Physical Exam  GENERAL: alert and no distress  EYES: Eyes grossly normal to inspection, PERRL and conjunctivae and sclerae normal  HENT: ear canals and TM's normal, nose and mouth without ulcers or lesions  NECK: no adenopathy, no asymmetry, masses, or scars  RESP: lungs clear to auscultation - no rales, rhonchi or wheezes  CV: regular rate and rhythm, normal S1 S2, no S3 or S4, no murmur, click or rub, no peripheral edema  ABDOMEN: soft, nontender, no hepatosplenomegaly, no masses and bowel sounds normal  MS: no gross musculoskeletal defects noted, no edema  SKIN: no suspicious lesions or rashes  NEURO: Normal strength and tone, mentation intact and speech normal  PSYCH: mentation appears normal, affect normal/bright        Signed Electronically by: Cecile Soria MD    Answers submitted by the patient for this visit:  Patient Health Questionnaire (Submitted on 10/30/2024)  If you checked off any problems, how difficult have these problems made it for you to do your work, take care of things at home, or get along with other people?: Not difficult at all  PHQ9 TOTAL SCORE: 2    "

## 2024-10-31 NOTE — LETTER
November 8, 2024      Satnam Mitchell  [address]  [place] [address]        Satnam,    It was a pleasure to see you in the office recently.  Labs look great. The kidney and electrolyte panel was normal.  Your A1c continues to be in the nondiabetic range.  Vitamin D level is normal, please continue your current supplementation  Blood count panel and iron level were both normal.  There is no anemia.  Thyroid test was normal.  Urine protein level was normal which is another marker of healthy kidney function  Please continue with your current treatments and plans as we had reviewed in the office.    Please MyChart or call if you have any concerns or questions.    Sincerely,    Cecile Soria MD  Resulted Orders   HEMOGLOBIN A1C   Result Value Ref Range    Estimated Average Glucose 111 <117 mg/dL    Hemoglobin A1C 5.5 0.0 - 5.6 %      Comment:      Normal <5.7%   Prediabetes 5.7-6.4%    Diabetes 6.5% or higher     Note: Adopted from ADA consensus guidelines.   BASIC METABOLIC PANEL   Result Value Ref Range    Sodium 141 135 - 145 mmol/L    Potassium 4.2 3.4 - 5.3 mmol/L    Chloride 103 98 - 107 mmol/L    Carbon Dioxide (CO2) 27 22 - 29 mmol/L    Anion Gap 11 7 - 15 mmol/L    Urea Nitrogen 21.5 8.0 - 23.0 mg/dL    Creatinine 0.77 0.51 - 0.95 mg/dL    GFR Estimate 88 >60 mL/min/1.73m2      Comment:      eGFR calculated using 2021 CKD-EPI equation.    Calcium 9.6 8.8 - 10.4 mg/dL      Comment:      Reference intervals for this test were updated on 7/16/2024 to reflect our healthy population more accurately. There may be differences in the flagging of prior results with similar values performed with this method. Those prior results can be interpreted in the context of the updated reference intervals.    Glucose 94 70 - 99 mg/dL   TSH WITH FREE T4 REFLEX   Result Value Ref Range    TSH 2.08 0.30 - 4.20 uIU/mL   Vitamin D Deficiency   Result Value Ref Range    Vitamin D, Total (25-Hydroxy) 36 20 - 50 ng/mL       Comment:      optimum levels    Narrative    Season, race, dietary intake, and treatment affect the concentration of 25-hydroxy-Vitamin D. Values may decrease during winter months and increase during summer months.    Vitamin D determination is routinely performed by an immunoassay specific for 25 hydroxyvitamin D3.  If an individual is on vitamin D2(ergocalciferol) supplementation, please specify 25 OH vitamin D2 and D3 level determination by LCMSMS test VITD23.     CBC with platelets and differential   Result Value Ref Range    WBC Count 6.9 4.0 - 11.0 10e3/uL    RBC Count 4.65 3.80 - 5.20 10e6/uL    Hemoglobin 13.6 11.7 - 15.7 g/dL    Hematocrit 40.6 35.0 - 47.0 %    MCV 87 78 - 100 fL    MCH 29.2 26.5 - 33.0 pg    MCHC 33.5 31.5 - 36.5 g/dL    RDW 12.6 10.0 - 15.0 %    Platelet Count 185 150 - 450 10e3/uL    % Neutrophils 48 %    % Lymphocytes 45 %    % Monocytes 6 %    % Eosinophils 1 %    % Basophils 0 %    % Immature Granulocytes 0 %    Absolute Neutrophils 3.3 1.6 - 8.3 10e3/uL    Absolute Lymphocytes 3.1 0.8 - 5.3 10e3/uL    Absolute Monocytes 0.4 0.0 - 1.3 10e3/uL    Absolute Eosinophils 0.1 0.0 - 0.7 10e3/uL    Absolute Basophils 0.0 0.0 - 0.2 10e3/uL    Absolute Immature Granulocytes 0.0 <=0.4 10e3/uL       If you have any questions or concerns, please call the clinic at the number listed above.

## 2024-10-31 NOTE — PROGRESS NOTES
Prior to immunization administration, verified patients identity using patient s name and date of birth. Please see Immunization Activity for additional information.     Screening Questionnaire for Adult Immunization    Are you sick today?   No   Do you have allergies to medications, food, a vaccine component or latex?   No   Have you ever had a serious reaction after receiving a vaccination?   No   Do you have a long-term health problem with heart, lung, kidney, or metabolic disease (e.g., diabetes), asthma, a blood disorder, no spleen, complement component deficiency, a cochlear implant, or a spinal fluid leak?  Are you on long-term aspirin therapy?   No   Do you have cancer, leukemia, HIV/AIDS, or any other immune system problem?   No   Do you have a parent, brother, or sister with an immune system problem?   No   In the past 3 months, have you taken medications that affect  your immune system, such as prednisone, other steroids, or anticancer drugs; drugs for the treatment of rheumatoid arthritis, Crohn s disease, or psoriasis; or have you had radiation treatments?   No   Have you had a seizure, or a brain or other nervous system problem?   No   During the past year, have you received a transfusion of blood or blood    products, or been given immune (gamma) globulin or antiviral drug?   No   For women: Are you pregnant or is there a chance you could become       pregnant during the next month?   No   Have you received any vaccinations in the past 4 weeks?   No     Immunization questionnaire answers were all negative.      Patient instructed to remain in clinic for 15 minutes afterwards, and to report any adverse reactions.     Screening performed by Lopez Romo MA on 10/31/2024 at 3:36 PM.

## 2024-10-31 NOTE — PATIENT INSTRUCTIONS
Schedule with dermatology  Patient Education   Preventive Care Advice   This is general advice given by our system to help you stay healthy. However, your care team may have specific advice just for you. Please talk to your care team about your preventive care needs.  Nutrition  Eat 5 or more servings of fruits and vegetables each day.  Try wheat bread, brown rice and whole grain pasta (instead of white bread, rice, and pasta).  Get enough calcium and vitamin D. Check the label on foods and aim for 100% of the RDA (recommended daily allowance).  Lifestyle  Exercise at least 150 minutes each week  (30 minutes a day, 5 days a week).  Do muscle strengthening activities 2 days a week. These help control your weight and prevent disease.  No smoking.  Wear sunscreen to prevent skin cancer.  Have a dental exam and cleaning every 6 months.  Yearly exams  See your health care team every year to talk about:  Any changes in your health.  Any medicines your care team has prescribed.  Preventive care, family planning, and ways to prevent chronic diseases.  Shots (vaccines)   HPV shots (up to age 26), if you've never had them before.  Hepatitis B shots (up to age 59), if you've never had them before.  COVID-19 shot: Get this shot when it's due.  Flu shot: Get a flu shot every year.  Tetanus shot: Get a tetanus shot every 10 years.  Pneumococcal, hepatitis A, and RSV shots: Ask your care team if you need these based on your risk.  Shingles shot (for age 50 and up)  General health tests  Diabetes screening:  Starting at age 35, Get screened for diabetes at least every 3 years.  If you are younger than age 35, ask your care team if you should be screened for diabetes.  Cholesterol test: At age 39, start having a cholesterol test every 5 years, or more often if advised.  Bone density scan (DEXA): At age 50, ask your care team if you should have this scan for osteoporosis (brittle bones).  Hepatitis C: Get tested at least once in your  life.  STIs (sexually transmitted infections)  Before age 24: Ask your care team if you should be screened for STIs.  After age 24: Get screened for STIs if you're at risk. You are at risk for STIs (including HIV) if:  You are sexually active with more than one person.  You don't use condoms every time.  You or a partner was diagnosed with a sexually transmitted infection.  If you are at risk for HIV, ask about PrEP medicine to prevent HIV.  Get tested for HIV at least once in your life, whether you are at risk for HIV or not.  Cancer screening tests  Cervical cancer screening: If you have a cervix, begin getting regular cervical cancer screening tests starting at age 21.  Breast cancer scan (mammogram): If you've ever had breasts, begin having regular mammograms starting at age 40. This is a scan to check for breast cancer.  Colon cancer screening: It is important to start screening for colon cancer at age 45.  Have a colonoscopy test every 10 years (or more often if you're at risk) Or, ask your provider about stool tests like a FIT test every year or Cologuard test every 3 years.  To learn more about your testing options, visit:   .  For help making a decision, visit:   https://bit.ly/vj69860.  Prostate cancer screening test: If you have a prostate, ask your care team if a prostate cancer screening test (PSA) at age 55 is right for you.  Lung cancer screening: If you are a current or former smoker ages 50 to 80, ask your care team if ongoing lung cancer screenings are right for you.  For informational purposes only. Not to replace the advice of your health care provider. Copyright   2023 [employer]. All rights reserved. Clinically reviewed by the [facility]. [employer] [id number] - REV 01/24.

## 2024-10-31 NOTE — LETTER
October 31, 2024      Juliann Mitchell  [address]  [place] [address]        To Whom It May Concern,     Juliann Mitchell is followed in our clinic. Her hearing aids are medically needed due to sensorineural hearing loss.               Sincerely,        Cecile Soria MD

## 2024-11-01 ENCOUNTER — PATIENT OUTREACH (OUTPATIENT)
Dept: CARE COORDINATION | Facility: CLINIC | Age: 59
End: 2024-11-01
Payer: COMMERCIAL

## 2024-11-01 LAB
ANION GAP SERPL CALCULATED.3IONS-SCNC: 11 MMOL/L (ref 7–15)
BUN SERPL-MCNC: 21.5 MG/DL (ref 8–23)
CALCIUM SERPL-MCNC: 9.6 MG/DL (ref 8.8–10.4)
CHLORIDE SERPL-SCNC: 103 MMOL/L (ref 98–107)
CREAT SERPL-MCNC: 0.77 MG/DL (ref 0.51–0.95)
CREAT UR-MCNC: 182 MG/DL
EGFRCR SERPLBLD CKD-EPI 2021: 88 ML/MIN/1.73M2
FERRITIN SERPL-MCNC: 49 NG/ML (ref 11–328)
GLUCOSE SERPL-MCNC: 94 MG/DL (ref 70–99)
HCO3 SERPL-SCNC: 27 MMOL/L (ref 22–29)
MICROALBUMIN UR-MCNC: <12 MG/L
MICROALBUMIN/CREAT UR: NORMAL MG/G{CREAT}
POTASSIUM SERPL-SCNC: 4.2 MMOL/L (ref 3.4–5.3)
SODIUM SERPL-SCNC: 141 MMOL/L (ref 135–145)
TSH SERPL DL<=0.005 MIU/L-ACNC: 2.08 UIU/ML (ref 0.3–4.2)
VIT D+METAB SERPL-MCNC: 36 NG/ML (ref 20–50)

## 2024-11-01 NOTE — RESULT ENCOUNTER NOTE
Satnam,  It was a pleasure to see you in the office recently.   Labs look great. The kidney and electrolyte panel was normal.  Your A1c continues to be in the nondiabetic range.  Vitamin D level is normal, please continue your current supplementation  Blood count panel and iron level were both normal.  There is no anemia.  Thyroid test was normal.  Urine protein level was normal which is another marker of healthy kidney function  Please continue with your current treatments and plans as we had reviewed in the office.  Please MyChart or call if you have any concerns or questions.   Sincerely,  Cecile Soria MD

## 2024-12-18 ENCOUNTER — ANCILLARY PROCEDURE (OUTPATIENT)
Dept: BONE DENSITY | Facility: CLINIC | Age: 59
End: 2024-12-18
Attending: FAMILY MEDICINE
Payer: COMMERCIAL

## 2024-12-18 DIAGNOSIS — E28.39 ESTROGEN DEFICIENCY: ICD-10-CM

## 2024-12-18 PROCEDURE — 77080 DXA BONE DENSITY AXIAL: CPT | Performed by: RADIOLOGY

## 2024-12-19 NOTE — RESULT ENCOUNTER NOTE
Dear Satnam,    The results of your recent bone density scan were NORMAL.      Continue with the goal intake of 1,200 mg of calcium per day (total from diet and supplements) and at least 1,000 International units of Vitamin D-3 per day.    Exercise daily to keep your bones strong. Regular strength training and resistance exercises are especially important for bone health.    Please MyChart or call if you have any concerns or questions.     Kind regards,    Cecile Soria MD

## 2025-03-18 ENCOUNTER — VIRTUAL VISIT (OUTPATIENT)
Dept: FAMILY MEDICINE | Facility: CLINIC | Age: 60
End: 2025-03-18
Payer: COMMERCIAL

## 2025-03-18 DIAGNOSIS — E11.9 TYPE 2 DIABETES MELLITUS WITHOUT COMPLICATION, WITHOUT LONG-TERM CURRENT USE OF INSULIN (H): ICD-10-CM

## 2025-03-18 DIAGNOSIS — F41.8 SITUATIONAL ANXIETY: Primary | ICD-10-CM

## 2025-03-18 DIAGNOSIS — F33.1 MODERATE EPISODE OF RECURRENT MAJOR DEPRESSIVE DISORDER (H): ICD-10-CM

## 2025-03-18 DIAGNOSIS — E66.01 MORBID OBESITY (H): ICD-10-CM

## 2025-03-18 PROCEDURE — 98013 SYNCH AUDIO-ONLY EST LOW 20: CPT | Performed by: FAMILY MEDICINE

## 2025-03-18 RX ORDER — LORAZEPAM 0.5 MG/1
.5-1 TABLET ORAL EVERY 6 HOURS PRN
Qty: 20 TABLET | Refills: 0 | Status: SHIPPED | OUTPATIENT
Start: 2025-03-18

## 2025-03-18 NOTE — PROGRESS NOTES
"  If patient has telephone visit, have they been educated on video visit as preferred visit method and offered to change to video visit? N/A        Instructions Relayed to Patient by Virtual Roomer:     Patient is active on [employer]:   Relayed following to patient: \"It looks like you are active on [employer], are you able to join the visit this way? If not, do you need us to send you a link now or would you like your provider to send a link via text or email when they are ready to initiate the visit?\"      Patient Confirmed they will join visit via: Yemeksepeti  Reminded patient to ensure they were logged on to virtual visit by arrival time listed.   Asked if patient has flexibility to initiate visit sooner than arrival time: patient is unable to initiate visit earlier than arrival time     If pediatric virtual visit, ensured pediatric patient along with parent/guardian will be present for video visit.     Patient offered the website [email or web address] and/or phone number to [employer] Help line: [phone]      Satnam is a 59 year old who is being evaluated via a billable video visit.    How would you like to obtain your AVS? Yemeksepeti  If the video visit is dropped, the invitation should be resent by: Text to cell phone: [phone]  Will anyone else be joining your video visit? No      Assessment & Plan     Situational anxiety  History of flight anxiety and upcoming long flights planned.  Recommended lorazepam to help with flight anxiety (she has no personal history of addiction). Reviewed risk of sedation with the medication.  Do not mix medication with etoh or other sedatives discussed.  Also discussed using lowest dose to control symptoms as able.  Can even cut the tablet in half if need be.  - LORazepam (ATIVAN) 0.5 MG tablet; Take 1-2 tablets (0.5-1 mg) by mouth every 6 hours as needed for anxiety.    Depression-well-controlled on sertraline  DM2/obesity-continues on Mounjaro  She is scheduled for " "follow-up at the end of April to address her diabetes and medication check.            Madhuri Hay is a 59 year old, presenting for the following health issues:  Consult (Anxiety Medication for Flying)      3/18/2025     7:38 AM   Additional Questions   Roomed by Charo LOPEZ   Accompanied by Self     History of Present Illness       Reason for visit:  Anxiety over a long flight to [facility]  Symptom onset:  More than a month  Symptoms include:  Anxiety  Symptom intensity:  Moderate  Symptom progression:  Staying the same  Had these symptoms before:  Yes  Has tried/received treatment for these symptoms:  No   She is taking medications regularly.        Son works for [employer] and taking a position in [facility]. She will be going with him Flight is [place].5 hours from  but taking two flights to get there.    Gets anxious at times about \"being in a metal box\" with flying and concerned that may happen especially with the longer flight. Hx of panic in past at times but not recently            Objective           Vitals:  No vitals were obtained today due to virtual visit.    Physical Exam   GENERAL: alert and no distress  EYES: Eyes grossly normal to inspection.  No discharge or erythema, or obvious scleral/conjunctival abnormalities.  RESP: No audible wheeze, cough, or visible cyanosis.    SKIN: Visible skin clear. No significant rash, abnormal pigmentation or lesions.  NEURO: Cranial nerves grossly intact.  Mentation and speech appropriate for age.  PSYCH: Appropriate affect, tone, and pace of words          Video-Visit Details    Type of service:  Video Visit   Originating Location (pt. Location): Home    Distant Location (provider location):  Off-site  Platform used for Video Visit: Unable to complete video visit, telephone visit completed, total time was 11 min.  Signed Electronically by: Cecile Soria MD    "

## 2025-04-01 ENCOUNTER — PATIENT OUTREACH (OUTPATIENT)
Dept: CARE COORDINATION | Facility: CLINIC | Age: 60
End: 2025-04-01
Payer: COMMERCIAL

## 2025-04-27 SDOH — HEALTH STABILITY: PHYSICAL HEALTH: ON AVERAGE, HOW MANY DAYS PER WEEK DO YOU ENGAGE IN MODERATE TO STRENUOUS EXERCISE (LIKE A BRISK WALK)?: 5 DAYS

## 2025-04-27 SDOH — HEALTH STABILITY: PHYSICAL HEALTH: ON AVERAGE, HOW MANY MINUTES DO YOU ENGAGE IN EXERCISE AT THIS LEVEL?: 30 MIN

## 2025-04-27 ASSESSMENT — SOCIAL DETERMINANTS OF HEALTH (SDOH): HOW OFTEN DO YOU GET TOGETHER WITH FRIENDS OR RELATIVES?: TWICE A WEEK

## 2025-04-29 ASSESSMENT — PATIENT HEALTH QUESTIONNAIRE - PHQ9
SUM OF ALL RESPONSES TO PHQ QUESTIONS 1-9: 1
SUM OF ALL RESPONSES TO PHQ QUESTIONS 1-9: 1
10. IF YOU CHECKED OFF ANY PROBLEMS, HOW DIFFICULT HAVE THESE PROBLEMS MADE IT FOR YOU TO DO YOUR WORK, TAKE CARE OF THINGS AT HOME, OR GET ALONG WITH OTHER PEOPLE: NOT DIFFICULT AT ALL

## 2025-04-30 ENCOUNTER — OFFICE VISIT (OUTPATIENT)
Dept: FAMILY MEDICINE | Facility: CLINIC | Age: 60
End: 2025-04-30
Attending: FAMILY MEDICINE
Payer: COMMERCIAL

## 2025-04-30 VITALS
BODY MASS INDEX: 40.67 KG/M2 | HEART RATE: 78 BPM | TEMPERATURE: 97.8 F | WEIGHT: 259.1 LBS | OXYGEN SATURATION: 98 % | SYSTOLIC BLOOD PRESSURE: 103 MMHG | DIASTOLIC BLOOD PRESSURE: 72 MMHG | RESPIRATION RATE: 19 BRPM | HEIGHT: 67 IN

## 2025-04-30 DIAGNOSIS — D50.9 IRON DEFICIENCY ANEMIA, UNSPECIFIED IRON DEFICIENCY ANEMIA TYPE: ICD-10-CM

## 2025-04-30 DIAGNOSIS — E66.01 MORBID OBESITY (H): ICD-10-CM

## 2025-04-30 DIAGNOSIS — E55.9 VITAMIN D DEFICIENCY: ICD-10-CM

## 2025-04-30 DIAGNOSIS — Z00.00 ROUTINE GENERAL MEDICAL EXAMINATION AT A HEALTH CARE FACILITY: Primary | ICD-10-CM

## 2025-04-30 DIAGNOSIS — N95.2 ATROPHIC VAGINITIS: ICD-10-CM

## 2025-04-30 DIAGNOSIS — Z90.3 H/O GASTRIC SLEEVE: ICD-10-CM

## 2025-04-30 DIAGNOSIS — E11.9 TYPE 2 DIABETES MELLITUS WITHOUT COMPLICATION, WITHOUT LONG-TERM CURRENT USE OF INSULIN (H): ICD-10-CM

## 2025-04-30 DIAGNOSIS — F33.1 MODERATE EPISODE OF RECURRENT MAJOR DEPRESSIVE DISORDER (H): ICD-10-CM

## 2025-04-30 PROCEDURE — 3078F DIAST BP <80 MM HG: CPT | Performed by: FAMILY MEDICINE

## 2025-04-30 PROCEDURE — 3074F SYST BP LT 130 MM HG: CPT | Performed by: FAMILY MEDICINE

## 2025-04-30 PROCEDURE — 99396 PREV VISIT EST AGE 40-64: CPT | Performed by: FAMILY MEDICINE

## 2025-04-30 RX ORDER — ESTRADIOL 0.1 MG/G
CREAM VAGINAL
Qty: 42.5 G | Refills: 2 | Status: SHIPPED | OUTPATIENT
Start: 2025-04-30 | End: 2026-05-14

## 2025-04-30 RX ORDER — TIRZEPATIDE 10 MG/.5ML
10 INJECTION, SOLUTION SUBCUTANEOUS
Qty: 2 ML | Refills: 3 | Status: SHIPPED | OUTPATIENT
Start: 2025-04-30

## 2025-04-30 NOTE — PATIENT INSTRUCTIONS
Consider famotidine (pepcid) 20 mg 1-2 x's daily. Start by skipping protonix one day a week and using the pepcid instead.     Send in your health directive to [facility]      Patient Education   Preventive Care Advice   This is general advice given by our system to help you stay healthy. However, your care team may have specific advice just for you. Please talk to your care team about your preventive care needs.  Nutrition  Eat 5 or more servings of fruits and vegetables each day.  Try wheat bread, brown rice and whole grain pasta (instead of white bread, rice, and pasta).  Get enough calcium and vitamin D. Check the label on foods and aim for 100% of the RDA (recommended daily allowance).  Lifestyle  Exercise at least 150 minutes each week  (30 minutes a day, 5 days a week).  Do muscle strengthening activities 2 days a week. These help control your weight and prevent disease.  No smoking.  Wear sunscreen to prevent skin cancer.  Have a dental exam and cleaning every 6 months.  Yearly exams  See your health care team every year to talk about:  Any changes in your health.  Any medicines your care team has prescribed.  Preventive care, family planning, and ways to prevent chronic diseases.  Shots (vaccines)   HPV shots (up to age 26), if you've never had them before.  Hepatitis B shots (up to age 59), if you've never had them before.  COVID-19 shot: Get this shot when it's due.  Flu shot: Get a flu shot every year.  Tetanus shot: Get a tetanus shot every 10 years.  Pneumococcal, hepatitis A, and RSV shots: Ask your care team if you need these based on your risk.  Shingles shot (for age 50 and up)  General health tests  Diabetes screening:  Starting at age 35, Get screened for diabetes at least every 3 years.  If you are younger than age 35, ask your care team if you should be screened for diabetes.  Cholesterol test: At age 39, start having a cholesterol test every 5 years, or more often if advised.  Bone  density scan (DEXA): At age 50, ask your care team if you should have this scan for osteoporosis (brittle bones).  Hepatitis C: Get tested at least once in your life.  STIs (sexually transmitted infections)  Before age 24: Ask your care team if you should be screened for STIs.  After age 24: Get screened for STIs if you're at risk. You are at risk for STIs (including HIV) if:  You are sexually active with more than one person.  You don't use condoms every time.  You or a partner was diagnosed with a sexually transmitted infection.  If you are at risk for HIV, ask about PrEP medicine to prevent HIV.  Get tested for HIV at least once in your life, whether you are at risk for HIV or not.  Cancer screening tests  Cervical cancer screening: If you have a cervix, begin getting regular cervical cancer screening tests starting at age 21.  Breast cancer scan (mammogram): If you've ever had breasts, begin having regular mammograms starting at age 40. This is a scan to check for breast cancer.  Colon cancer screening: It is important to start screening for colon cancer at age 45.  Have a colonoscopy test every 10 years (or more often if you're at risk) Or, ask your provider about stool tests like a FIT test every year or Cologuard test every 3 years.  To learn more about your testing options, visit:   .  For help making a decision, visit:   https://bit.ly/oq31340.  Prostate cancer screening test: If you have a prostate, ask your care team if a prostate cancer screening test (PSA) at age 55 is right for you.  Lung cancer screening: If you are a current or former smoker ages 50 to 80, ask your care team if ongoing lung cancer screenings are right for you.  For informational purposes only. Not to replace the advice of your health care provider. Copyright   2023 [employer]. All rights reserved. Clinically reviewed by the [facility] Transitions Program. Sem[employer] [id number] - REV 01/24.

## 2025-04-30 NOTE — PROGRESS NOTES
"Preventive Care Visit  [facility] ABDULLAHI Huber Lucia Soria MD, Family Medicine  Apr 30, 2025      Assessment & Plan     Routine general medical examination at a health care facility    Type 2 diabetes mellitus without complication, without long-term current use of insulin (H)  Reviewed good footcare.  Overall her diabetes has been well-controlled.  Will get updated labs today.  - COMPREHENSIVE METABOLIC PANEL; Future  - Lipid panel reflex to direct LDL Non-fasting; Future  - HEMOGLOBIN A1C; Future  - MOUNJARO 10 MG/0.5ML SOAJ auto-injector pen; Inject 0.5 mLs (10 mg) subcutaneously every 7 days.    Morbid obesity (H)  She has had good interval weight loss on GLP medication.  Is ready to move dose up to 10 mg each week and no longer alternate with the 7.5 mg lower dose.  New Rx sent to pharmacy.  GI side effects are currently well-managed.    Atrophic vaginitis  Has been noting some vaginal dryness and decreased libido.  No urinary symptoms.  Will trial estradiol cream to see if this helps.  Could also consider adding bupropion for libido in the future if that remains low to help offset SSRI impact.  - estradiol (ESTRACE) 0.1 MG/GM vaginal cream; Place 1 g vaginally daily for 14 days, THEN 1 g twice a week.    H/O gastric sleeve  Iron deficiency anemia, unspecified iron deficiency anemia type  Continues to use vitamin B12, multivitamin, and vitamin D.  Will check levels annually  - Vitamin B12; Future    Depression, moderate, recurrent  Has been under excellent control.  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  We did discuss if she wants to try over the summer she could try dropping the dose from 100 to 50 mg daily.              BMI  Estimated body mass index is 40.58 kg/m  as calculated from the following:    Height as of this encounter: 1.702 m (5' 7\").    Weight as of this encounter: 117.5 kg (259 lb " 1.6 oz).   Weight management plan: Discussed healthy diet and exercise guidelines    Counseling  Appropriate preventive services were addressed with this patient via screening, questionnaire, or discussion as appropriate for fall prevention, nutrition, physical activity, Tobacco-use cessation, social engagement, weight loss and cognition.  Checklist reviewing preventive services available has been given to the patient.  Reviewed patient's diet, addressing concerns and/or questions.       Follow-up   Return in about 6 months (around 10/30/2025) for Follow up, diabetes visit.     Follow-up Visit   Expected date:  Apr 30, 2026 (Approximate)      Follow Up Appointment Details:     Follow-up with whom?: PCP    Follow-Up for what?: Adult Preventive    How?: In Person                 Madhuri Hay is a 59 year old, presenting for the following:  Physical        4/30/2025     3:19 PM   Additional Questions   Roomed by Mckayla          HPI  Used lorazepam for recent flight to [facility] and worked well.     On mounjaro- has been on 10 mg alternating between 7.5 mg to use up old supply and feels this is going well.   Likes 10 mg dose and would like to continue     Linzess working well. Rx'd by GI   Gerd- protonix 3-4 years. Hasn't been able to wean down. Will have sx's if don't take for a day.     Mood stable at current dose.     Walking for work, [id number] steps per day.     Has appt for varicose veins in legs. Ongoing pain.   Wears compression stockings intermittently    Has seen dermatology.     Noting vaginal dryness and decreased libido        Advance Care Planning    Patient states has Health Care Directive and will send to [employer].        4/27/2025   General Health   How would you rate your overall physical health? Good   Feel stress (tense, anxious, or unable to sleep) Not at all         4/27/2025   Nutrition   Three or more servings of calcium each day? Yes   Diet: Diabetic   How many servings of fruit and  vegetables per day? (!) 2-3   How many sweetened beverages each day? 0-1         4/27/2025   Exercise   Days per week of moderate/strenous exercise 5 days   Average minutes spent exercising at this level 30 min         4/27/2025   Social Factors   Frequency of gathering with friends or relatives Twice a week   Worry food won't last until get money to buy more No   Food not last or not have enough money for food? No   Do you have housing? (Housing is defined as stable permanent housing and does not include staying outside in a car, in a tent, in an abandoned building, in an overnight shelter, or couch-surfing.) Yes   Are you worried about losing your housing? No   Lack of transportation? No   Unable to get utilities (heat,electricity)? No         4/27/2025   Fall Risk   Fallen 2 or more times in the past year? No   Trouble with walking or balance? No          4/27/2025   Dental   Dentist two times every year? Yes       Today's PHQ-9 Score:       4/29/2025     6:34 PM   PHQ-9 SCORE   PHQ-9 Total Score MyChart 1 (Minimal depression)   PHQ-9 Total Score 1        Patient-reported         4/27/2025   Substance Use   Alcohol more than 3/day or more than 7/wk No   Do you use any other substances recreationally? No     Social History     Tobacco Use    Smoking status: Never     Passive exposure: Never    Smokeless tobacco: Never   Vaping Use    Vaping status: Never Used   Substance Use Topics    Alcohol use: Yes     Comment: 1 drink per week    Drug use: No           12/18/2024   LAST FHS-7 RESULTS   1st degree relative breast or ovarian cancer No   Any relative bilateral breast cancer No   Any male have breast cancer No   Any ONE woman have BOTH breast AND ovarian cancer No   Any woman with breast cancer before 50yrs No   2 or more relatives with breast AND/OR ovarian cancer No   2 or more relatives with breast AND/OR bowel cancer No        Mammogram Screening - Mammogram every 1-2 years updated in Health Maintenance based  "on mutual decision making        4/27/2025   STI Screening   New sexual partner(s) since last STI/HIV test? No     History of abnormal Pap smear: No - age 30- 64 PAP with HPV every 5 years recommended        1/27/2022     3:31 PM 1/4/2017    12:00 AM   PAP / HPV   PAP-ABSTRACT See Scanned Document     See Scanned Document           This result is from an external source.     ASCVD Risk   The 10-year ASCVD risk score (Cameron SHAH, et al., 2019) is: 4%    Values used to calculate the score:      Age: 59 years      Sex: Female      Is Non- : No      Diabetic: Yes      Tobacco smoker: No      Systolic Blood Pressure: 103 mmHg      Is BP treated: No      HDL Cholesterol: 58 mg/dL      Total Cholesterol: 198 mg/dL           Reviewed and updated as needed this visit by Provider                          Review of Systems  Constitutional, neuro, ENT, endocrine, pulmonary, cardiac, gastrointestinal, genitourinary, musculoskeletal, integument and psychiatric systems are negative, except as otherwise noted.     Objective    Exam  /72 (BP Location: Right arm, Patient Position: Sitting, Cuff Size: Adult Large)   Pulse 78   Temp 97.8  F (36.6  C) (Oral)   Resp 19   Ht 1.702 m (5' 7\")   Wt 117.5 kg (259 lb 1.6 oz)   LMP 06/06/2015   SpO2 98%   BMI 40.58 kg/m     Estimated body mass index is 40.58 kg/m  as calculated from the following:    Height as of this encounter: 1.702 m (5' 7\").    Weight as of this encounter: 117.5 kg (259 lb 1.6 oz).    Physical Exam  GENERAL: alert and no distress  EYES: Eyes grossly normal to inspection, PERRL and conjunctivae and sclerae normal  HENT: ear canals and TM's normal, nose and mouth without ulcers or lesions  NECK: no adenopathy, no asymmetry, masses, or scars  RESP: lungs clear to auscultation - no rales, rhonchi or wheezes  CV: regular rate and rhythm, normal S1 S2, no S3 or S4, no murmur, click or rub, no peripheral edema. Multiple varicosities LE " R>L  ABDOMEN: soft, nontender, no hepatosplenomegaly, no masses and bowel sounds normal   (female): normal female external genitalia except mild redness of labia, normal urethral meatus , and vaginal mucosal atrophy  MS: no gross musculoskeletal defects noted, no edema  SKIN: no suspicious lesions or rashes  NEURO: Normal strength and tone, mentation intact and speech normal  PSYCH: mentation appears normal, affect normal/bright  Diabetic foot exam: normal DP and PT pulses, normal sensory exam, and normal monofilament exam, callus lateral 1st toes      Signed Electronically by: Cecile Soria MD    Answers submitted by the patient for this visit:  Patient Health Questionnaire (Submitted on 4/29/2025)  If you checked off any problems, how difficult have these problems made it for you to do your work, take care of things at home, or get along with other people?: Not difficult at all  PHQ9 TOTAL SCORE: 1

## 2025-05-04 ENCOUNTER — HEALTH MAINTENANCE LETTER (OUTPATIENT)
Age: 60
End: 2025-05-04

## 2025-05-07 ENCOUNTER — LAB (OUTPATIENT)
Dept: LAB | Facility: CLINIC | Age: 60
End: 2025-05-07
Payer: COMMERCIAL

## 2025-05-07 DIAGNOSIS — E11.9 TYPE 2 DIABETES MELLITUS WITHOUT COMPLICATION, WITHOUT LONG-TERM CURRENT USE OF INSULIN (H): ICD-10-CM

## 2025-05-07 DIAGNOSIS — D50.9 IRON DEFICIENCY ANEMIA, UNSPECIFIED IRON DEFICIENCY ANEMIA TYPE: ICD-10-CM

## 2025-05-07 LAB
ALBUMIN SERPL BCG-MCNC: 4.3 G/DL (ref 3.5–5.2)
ALP SERPL-CCNC: 89 U/L (ref 40–150)
ALT SERPL W P-5'-P-CCNC: 21 U/L (ref 0–50)
ANION GAP SERPL CALCULATED.3IONS-SCNC: 8 MMOL/L (ref 7–15)
AST SERPL W P-5'-P-CCNC: 25 U/L (ref 0–45)
BILIRUB SERPL-MCNC: 0.5 MG/DL
BUN SERPL-MCNC: 17.7 MG/DL (ref 8–23)
CALCIUM SERPL-MCNC: 9.6 MG/DL (ref 8.8–10.4)
CHLORIDE SERPL-SCNC: 105 MMOL/L (ref 98–107)
CHOLEST SERPL-MCNC: 197 MG/DL
CREAT SERPL-MCNC: 0.74 MG/DL (ref 0.51–0.95)
EGFRCR SERPLBLD CKD-EPI 2021: >90 ML/MIN/1.73M2
EST. AVERAGE GLUCOSE BLD GHB EST-MCNC: 117 MG/DL
FASTING STATUS PATIENT QL REPORTED: YES
FASTING STATUS PATIENT QL REPORTED: YES
GLUCOSE SERPL-MCNC: 91 MG/DL (ref 70–99)
HBA1C MFR BLD: 5.7 % (ref 0–5.6)
HCO3 SERPL-SCNC: 27 MMOL/L (ref 22–29)
HDLC SERPL-MCNC: 57 MG/DL
LDLC SERPL CALC-MCNC: 118 MG/DL
NONHDLC SERPL-MCNC: 140 MG/DL
POTASSIUM SERPL-SCNC: 4.7 MMOL/L (ref 3.4–5.3)
PROT SERPL-MCNC: 6.9 G/DL (ref 6.4–8.3)
SODIUM SERPL-SCNC: 140 MMOL/L (ref 135–145)
TRIGL SERPL-MCNC: 108 MG/DL
VIT B12 SERPL-MCNC: 515 PG/ML (ref 232–1245)

## 2025-05-07 PROCEDURE — 82607 VITAMIN B-12: CPT

## 2025-05-07 PROCEDURE — 80061 LIPID PANEL: CPT

## 2025-05-07 PROCEDURE — 80053 COMPREHEN METABOLIC PANEL: CPT

## 2025-05-07 PROCEDURE — 36415 COLL VENOUS BLD VENIPUNCTURE: CPT

## 2025-05-07 PROCEDURE — 83036 HEMOGLOBIN GLYCOSYLATED A1C: CPT

## 2025-05-08 ENCOUNTER — RESULTS FOLLOW-UP (OUTPATIENT)
Dept: FAMILY MEDICINE | Facility: CLINIC | Age: 60
End: 2025-05-08

## 2025-05-08 NOTE — RESULT ENCOUNTER NOTE
Satnam,  It was a pleasure to see you in the office recently.   Your A1c continues to show your diabetes is well-controlled.  The kidney, liver and electrolyte panel was normal.  Your vitamin B12 level was normal range.  Please continue regular supplementation  Your cholesterol shows the LDL (bad cholesterol) has remained a little bit above goal range of less than 100.  If you are taking the atorvastatin faithfully every day then I think it is time to increase the dose up to 40 mg daily.  If you have missed quite a few doses of this medication then just taking it more consistently would be all that is needed.  Let me know either way so I can make the appropriate change if you are open to this.  Please MyChart or call if you have any concerns or questions.   Sincerely,  Cecile Soria MD

## 2025-06-04 ENCOUNTER — TRANSFERRED RECORDS (OUTPATIENT)
Dept: HEALTH INFORMATION MANAGEMENT | Facility: CLINIC | Age: 60
End: 2025-06-04
Payer: COMMERCIAL

## 2025-07-12 ENCOUNTER — TELEPHONE (OUTPATIENT)
Dept: PHARMACY | Facility: CLINIC | Age: 60
End: 2025-07-12
Payer: COMMERCIAL

## 2025-07-12 NOTE — TELEPHONE ENCOUNTER
We have been unable to reach this patient for MTM follow-up after several attempts. We will stop reaching out to the patient at this time. Please let us know if we can assist in this patient's care in the future.    Routing to PCP as SHI Goldstein, PharmD, [facility]  Medication Therapy Management Provider  [phone]

## 2025-07-30 ENCOUNTER — OFFICE VISIT (OUTPATIENT)
Dept: FAMILY MEDICINE | Facility: CLINIC | Age: 60
End: 2025-07-30
Payer: COMMERCIAL

## 2025-07-30 VITALS
SYSTOLIC BLOOD PRESSURE: 101 MMHG | HEIGHT: 68 IN | TEMPERATURE: 96.9 F | DIASTOLIC BLOOD PRESSURE: 70 MMHG | WEIGHT: 259.2 LBS | BODY MASS INDEX: 39.28 KG/M2 | HEART RATE: 82 BPM | OXYGEN SATURATION: 97 % | RESPIRATION RATE: 16 BRPM

## 2025-07-30 DIAGNOSIS — E11.9 TYPE 2 DIABETES MELLITUS WITHOUT COMPLICATION, WITHOUT LONG-TERM CURRENT USE OF INSULIN (H): ICD-10-CM

## 2025-07-30 DIAGNOSIS — E66.01 MORBID OBESITY (H): ICD-10-CM

## 2025-07-30 DIAGNOSIS — Z01.818 PREOP GENERAL PHYSICAL EXAM: Primary | ICD-10-CM

## 2025-07-30 DIAGNOSIS — F33.1 MODERATE EPISODE OF RECURRENT MAJOR DEPRESSIVE DISORDER (H): ICD-10-CM

## 2025-07-30 DIAGNOSIS — Z90.3 H/O GASTRIC SLEEVE: ICD-10-CM

## 2025-07-30 DIAGNOSIS — H02.403 PTOSIS OF BOTH EYELIDS: ICD-10-CM

## 2025-07-30 PROCEDURE — 1126F AMNT PAIN NOTED NONE PRSNT: CPT | Performed by: FAMILY MEDICINE

## 2025-07-30 PROCEDURE — 99214 OFFICE O/P EST MOD 30 MIN: CPT | Performed by: FAMILY MEDICINE

## 2025-07-30 PROCEDURE — G2211 COMPLEX E/M VISIT ADD ON: HCPCS | Performed by: FAMILY MEDICINE

## 2025-07-30 PROCEDURE — 3074F SYST BP LT 130 MM HG: CPT | Performed by: FAMILY MEDICINE

## 2025-07-30 PROCEDURE — 3078F DIAST BP <80 MM HG: CPT | Performed by: FAMILY MEDICINE

## 2025-07-30 ASSESSMENT — PAIN SCALES - GENERAL: PAINLEVEL_OUTOF10: NO PAIN (0)

## 2025-07-30 NOTE — PATIENT INSTRUCTIONS
How to Take Your Medication Before Surgery  Preoperative Medication Instructions   MEDICATION INSTRUCTIONS PRIOR TO SURGERY    Do not take any NSAID pain relievers such as ibuprofen (advil, motrin) or naproxen (aleve) or aspirin the week prior to surgery.   Acetaminophen (tylenol) is okay.     Hold all supplements two weeks prior to surgery    Hold Mounjaro, Aspirin for 7 days prior to surgery    Ok to take other prescription medications with small sip of water on the morning of surgery.          Patient Education   Preparing for Your Surgery  For Adults  Getting started  In most cases, a nurse will call to review your health history and instructions. They will give you an arrival time based on your scheduled surgery time. Please be ready to share:  Your doctor's clinic name and phone number  Your medical, surgical, and anesthesia history  A list of allergies and sensitivities  A list of medicines, including herbal treatments and over-the-counter drugs  Whether the patient has a legal guardian (ask how to send us the papers in advance)  Note: You may not receive a call if you were seen at our PAC (Preoperative Assessment Center).  Please tell us if you're pregnant--or if there's any chance you might be pregnant. Some surgeries may injure a fetus (unborn baby), so they require a pregnancy test. Surgeries that are safe for a fetus don't always need a test, and you can choose whether to have one.   Preparing for surgery  Within 10 to 30 days of surgery: Have a pre-op exam (sometimes called an H&P, or History and Physical). This can be done at a clinic or pre-operative center.  If you're having a , you may not need this exam. Talk to your care team.  At your pre-op exam, talk to your care team about all medicines you take. (This includes CBD oil and any drugs, such as THC, marijuana, and other forms of cannabis.) If you need to stop any medicine before surgery, ask when to start taking it again.  This is for  your safety. Many medicines and drugs can make you bleed too much during surgery. Some change how well surgery (anesthesia) drugs work.  Call your insurance company to let them know you're having surgery. (If you don't have insurance, call [phone].)  Call your clinic if there's any change in your health. This includes a scrape or scratch near the surgery site, or any signs of a cold (sore throat, runny nose, cough, rash, fever).  Eating and drinking guidelines  For your safety: Unless your surgeon tells you otherwise, follow the guidelines below.  Eat and drink as normal until 8 hours before you arrive for surgery. After that, no food or milk. You can spit out gum when you arrive.  Drink clear liquids until 2 hours before you arrive. These are liquids you can see through, like water, Gatorade, and Propel Water. They also include plain black coffee and tea (no cream or milk).  No alcohol for 24 hours before you arrive. The night before surgery, stop any drinks that contain THC.  If your care team tells you to take medicine on the morning of surgery, it's okay to take it with a sip of water. No other medicines or drugs are allowed (including CBD oil)--follow your care team's instructions.  If you have questions the day of surgery, call your hospital or surgery center.   Preventing infection  Shower or bathe the night before and the morning of surgery. Follow the instructions your clinic gave you. (If no instructions, use regular soap.)  Don't shave or clip hair near your surgery site. We'll remove the hair if needed.  Don't smoke or vape the morning of surgery. No chewing tobacco for 6 hours before you arrive. A nicotine patch is okay. You may spit out nicotine gum when you arrive.  For some surgeries, the surgeon will tell you to fully quit smoking and nicotine.  We will make every effort to keep you safe from infection. We will:  Clean our hands often with soap and water (or an alcohol-based hand rub).  Clean  the skin at your surgery site with a special soap that kills germs.  Give you a special gown to keep you warm. (Cold raises the risk of infection.)  Wear hair covers, masks, gowns, and gloves during surgery.  Give antibiotic medicine, if prescribed. Not all surgeries need this medicine.  What to bring on the day of surgery  Photo ID and insurance card  Copy of your health care directive, if you have one  Glasses and hearing aids (bring cases)  You can't wear contacts during surgery  Inhaler and eye drops, if you use them (tell us about these when you arrive)  CPAP machine or breathing device, if you use them  A few personal items, if spending the night  If you have . . .  A pacemaker, ICD (cardiac defibrillator), or other implant: Bring the ID card.  An implanted stimulator: Bring the remote control.  A legal guardian: Bring a copy of the certified (court-stamped) guardianship papers.  Please remove any jewelry, including body piercings. Leave jewelry and other valuables at home.  If you're going home the day of surgery  You must have a support person drive you home. They should stay with you overnight, and they may need to help with your self-care.  If you don't have a support person, please tells us as soon as possible. We can help.  After surgery  If it's hard to control your pain or you need more pain medicine, please call your surgeon's office.  Questions?   If you have any questions for your care team, list them here:   ____________________________________________________________________________________________________________________________________________________________________________________________________________________________________________________________  For informational purposes only. Not to replace the advice of your health care provider. Copyright   2003, 2019 [facility]. All rights reserved. Clinically reviewed by João Aldrich MD. [employer] [id number] - REV 02/25.

## 2025-07-30 NOTE — PROGRESS NOTES
Preoperative Evaluation  [address] [address]  Phone: [phone]  Primary Provider: Cecile Soria MD  Pre-op Performing Provider: Cecile Soria MD  Jul 30, 2025 7/29/2025   Surgical Information   What procedure is being done? Eye lid   Facility or Hospital where procedure/surgery will be performed: [facility]  [place]   Who is doing the procedure / surgery? Dr emma Ruelas   Date of surgery / procedure: 8/18/2025   Time of surgery / procedure: Tbd   Where do you plan to recover after surgery? at home with family     Fax number for surgical facility: [phone]    Assessment & Plan     The proposed surgical procedure is considered LOW risk.    Preop general physical exam  Ptosis of both eyelids    Type 2 diabetes mellitus without complication, without long-term current use of insulin (H)  Well controlled with mounjaro with last A1C at 5.7. will hold mounjaro for 7 days prior to procedure.     Morbid obesity (H)  Has been on Mounjaro with good wt loss although recent plateau of wt.     H/O gastric sleeve    Moderate episode of recurrent major depressive disorder (H)  Well controlled on sertraline.             - No identified additional risk factors other than previously addressed    MEDICATION INSTRUCTIONS PRIOR TO SURGERY    Do not take any NSAID pain relievers such as ibuprofen (advil, motrin) or naproxen (aleve) or aspirin the week prior to surgery.   Acetaminophen (tylenol) is okay.     Hold all supplements two weeks prior to surgery    Hold Mounjaro, Aspirin for 7 days prior to surgery    Ok to take other prescription medications with small sip of water on the morning of surgery.         Recommendation  Approval given to proceed with proposed procedure, without further diagnostic evaluation.    Follow-up  Return in about 4 months (around 11/30/2025) for Routine preventive.    The longitudinal plan  of care for the diagnosis(es)/condition(s) as documented were addressed during this visit. Due to the added complexity in care, I will continue to support Satnam in the subsequent management and with ongoing continuity of care.    Madhuri Hay is a 59 year old, presenting for the following:  Pre-Op Exam (Eye lid surgery both eyes)          7/30/2025     2:38 PM   Additional Questions   Roomed by oRsalind DURAN: 59-year-old female with bilateral ptosis of her eyelids that is impacting vision.  Will be undergoing eyelid reduction bilateral        7/29/2025   Pre-Op Questionnaire   Have you ever had a heart attack or stroke? No   Have you ever had surgery on your heart or blood vessels, such as a stent placement, a coronary artery bypass, or surgery on an artery in your head, neck, heart, or legs? No   Do you have chest pain with activity? No   Do you have a history of heart failure? No   Do you currently have a cold, bronchitis or symptoms of other infection? No   Do you have a cough, shortness of breath, or wheezing? No   Do you or anyone in your family have previous history of blood clots? No   Do you or does anyone in your family have a serious bleeding problem such as prolonged bleeding following surgeries or cuts? No   Have you ever had problems with anemia or been told to take iron pills? No   Have you had any abnormal blood loss such as black, tarry or bloody stools, or abnormal vaginal bleeding? No   Have you ever had a blood transfusion? No   Are you willing to have a blood transfusion if it is medically needed before, during, or after your surgery? Yes   Have you or any of your relatives ever had problems with anesthesia? No   Do you have sleep apnea, excessive snoring or daytime drowsiness? No   Do you have any artifical heart valves or other implanted medical devices like a pacemaker, defibrillator, or continuous glucose monitor? No   Do you have artificial joints? No   Are you allergic to latex? No      Advance Care Planning    Discussed advance care planning with patient; informed AVS has link to [facility] [employer].    Preoperative Review of    reviewed - no record of controlled substances prescribed.      Status of Chronic Conditions:  See problem list for active medical problems.  Problems all longstanding and stable, except as noted/documented.  See ROS for pertinent symptoms related to these conditions.    Patient Active Problem List    Diagnosis Date Noted    H/O gastric sleeve 10/06/2022     Priority: Medium    Morbid obesity (H) 07/18/2019     Priority: Medium    Cholelithiasis      Priority: Medium    Moderate episode of recurrent major depressive disorder (H) 08/15/2016     Priority: Medium    Type 2 diabetes mellitus without complication, without long-term current use of insulin (H) 10/23/2015     Priority: Medium    Pituitary tumor 10/16/2013     Priority: Medium    Upper abdominal pain 10/16/2013     Priority: Medium    Hypocalcemia 03/28/2013     Priority: Medium    Vitamin D deficiency 03/28/2013     Priority: Medium    Anemia 03/28/2013     Priority: Medium     Problem list name updated by automated process. Provider to review        Past Medical History:   Diagnosis Date    Cholelithiasis     Colon polyps     Depression, major, recurrent     Depressive disorder     Diabetes (H)     FH: colon cancer     father    Obesity     s/p lap band and then gastric sleeve in 2017    Pituitary adenoma (H)     Resected age 16     Past Surgical History:   Procedure Laterality Date    Benign pituitary gland tumor removed      age 16    c sections      COLONOSCOPY  8/18    LAPAROSCOPIC CHOLECYSTECTOMY N/A 3/9/2018    Procedure: LAPAROSCOPIC CHOLECYSTECTOMY;  LAPAROSCOPIC CHOLECYSTECTOMY ;  Surgeon: Sandoval Acevedo MD;  Location:  OR    LAPAROSCOPIC GASTRIC ADJUSTABLE BANDING      [facility]    LAPAROSCOPIC GASTRIC SLEEVE  06/2017    Dr. Noyola at [facility]     Current Outpatient Medications    Medication Sig Dispense Refill    ACE/ARB/ARNI NOT PRESCRIBED (INTENTIONAL) Please choose reason not prescribed from choices below. Not needed - blood pressure at goal, microalbumin negative      aspirin (ASA) 81 MG EC tablet Take 1 tablet (81 mg) by mouth daily      atorvastatin (LIPITOR) 20 MG tablet Take 1 tablet (20 mg) by mouth daily. 90 tablet 3    COLLAGEN PO Take by mouth.      Continuous Glucose Sensor (FREESTYLE CLAIR 2 SENSOR) MISC Change every 14 days. 6 each 3    cyanocobalamin (VITAMIN B-12) 500 MCG tablet Take 500 mcg by mouth daily      estradiol (ESTRACE) 0.1 MG/GM vaginal cream Place 1 g vaginally daily for 14 days, THEN 1 g twice a week. 42.5 g 2    linaclotide (LINZESS) 145 MCG capsule Take 145 mcg by mouth every morning (before breakfast)      MOUNJARO 10 MG/0.5ML SOAJ auto-injector pen Inject 0.5 mLs (10 mg) subcutaneously every 7 days. 2 mL 3    pantoprazole (PROTONIX) 40 MG EC tablet Take 1 tablet (40 mg) by mouth daily. 90 tablet 3    Pediatric Multivit-Minerals-C (FLINTSTONES COMPLETE PO) Take 1 tablet by mouth daily      sertraline (ZOLOFT) 100 MG tablet Take 1 tablet (100 mg) by mouth daily. 90 tablet 3    VITAMIN D, CHOLECALCIFEROL, PO Take 2,000 Units by mouth daily         Allergies   Allergen Reactions    No Known Allergies         Social History     Tobacco Use    Smoking status: Never     Passive exposure: Never    Smokeless tobacco: Never   Substance Use Topics    Alcohol use: Yes     Comment: 0-1 drink per week     Family History   Problem Relation Age of Onset    Hypertension Mother         and depression    Depression Mother     Cancer - colorectal Father          age 55    Diabetes Father 50    Colon Cancer Father     Obesity Paternal Grandmother     Obesity Paternal Grandfather     Depression Son     Asthma Son     Depression Son     Asthma Son     Breast Cancer No family hx of     Ovarian Cancer No family hx of      History   Drug Use No             Review of  "Systems  CONSTITUTIONAL: NEGATIVE for fever, chills, change in weight  INTEGUMENTARY/SKIN: NEGATIVE for worrisome rashes, moles or lesions. Sunburn back while at ocean.   EYES: See HPI. NEGATIVE for other vision changes or irritation  ENT/MOUTH: NEGATIVE for ear, mouth and throat problems. +wears hearing aids.   RESP: NEGATIVE for significant cough or SOB  BREAST: NEGATIVE for masses, tenderness or discharge  CV: NEGATIVE for chest pain, palpitations or peripheral edema. Venous incompetency and working with vein clinic  GI: NEGATIVE for nausea, abdominal pain, heartburn, or change in bowel habits  : NEGATIVE for frequency, dysuria, or hematuria  MUSCULOSKELETAL: NEGATIVE for significant arthralgias or myalgia  NEURO: NEGATIVE for weakness, dizziness or paresthesias  ENDOCRINE: NEGATIVE for temperature intolerance, skin/hair changes  HEME: NEGATIVE for bleeding problems  PSYCHIATRIC: NEGATIVE for changes in mood or affect. On sertraline.     Objective    /70 (BP Location: Right arm, Patient Position: Sitting, Cuff Size: Adult Large)   Pulse 82   Temp 96.9  F (36.1  C) (Temporal)   Resp 16   Ht 1.735 m (5' 8.31\")   Wt 117.6 kg (259 lb 3.2 oz)   LMP 06/06/2015   SpO2 97%   BMI 39.06 kg/m     Estimated body mass index is 39.06 kg/m  as calculated from the following:    Height as of this encounter: 1.735 m (5' 8.31\").    Weight as of this encounter: 117.6 kg (259 lb 3.2 oz).  Physical Exam  GENERAL: alert and no distress  EYES: Eyes grossly normal to inspection, PERRL and conjunctivae and sclerae normal  HENT: ear canals and TM's normal, nose and mouth without ulcers or lesions  NECK: no adenopathy, no asymmetry, masses, or scars  RESP: lungs clear to auscultation - no rales, rhonchi or wheezes  CV: regular rate and rhythm, normal S1 S2, no S3 or S4, no murmur, click or rub, no peripheral edema. Multiple varicosities of LE  ABDOMEN: soft, nontender, no hepatosplenomegaly, no masses and bowel sounds " normal  MS: no gross musculoskeletal defects noted, no edema  SKIN: no suspicious lesions or rashes  NEURO: Normal strength and tone, mentation intact and speech normal  PSYCH: mentation appears normal, affect normal/bright    Recent Labs   Lab Test 05/07/25  1041 10/31/24  1545   HGB  --  13.6   PLT  --  185    141   POTASSIUM 4.7 4.2   CR 0.74 0.77   A1C 5.7* 5.5        Diagnostics  No labs were ordered during this visit.   No EKG required for low risk surgery (cataract, skin procedure, breast biopsy, etc).    Revised Cardiac Risk Index (RCRI)  The patient has the following serious cardiovascular risks for perioperative complications:   - No serious cardiac risks = 0 points     RCRI Interpretation: 0 points: Class I (very low risk - 0.4% complication rate)         Signed Electronically by: Cecile Soria MD  A copy of this evaluation report is provided to the requesting physician.

## (undated) DEVICE — SUCTION CANISTER MEDIVAC LINER 3000ML W/LID 65651-530

## (undated) DEVICE — SOL NACL 0.9% INJ 1000ML BAG 2B1324X

## (undated) DEVICE — LINEN TOWEL PACK X5 5464

## (undated) DEVICE — GOWN IMPERVIOUS SPECIALTY XLG/XLONG 32474

## (undated) DEVICE — SU VICRYL 4-0 PS-2 18" UND J496H

## (undated) DEVICE — PREP CHLORAPREP 26ML TINTED ORANGE  260815

## (undated) DEVICE — ENDO TROCAR FIRST ENTRY KII FIOS Z-THRD 05X100MM CTF03

## (undated) DEVICE — ENDO POUCH REIACATCH 2.44" 10MM CATCH10

## (undated) DEVICE — ENDO TROCAR OPTICAL 11MM VERSAPORT PLUS W/FIX CAN ONB11STF

## (undated) DEVICE — GLOVE PROTEXIS W/NEU-THERA 8.0  2D73TE80

## (undated) DEVICE — ENDO TROCAR OPTICAL 05MM VERSAPORT PLUS W/FIX CAN ONB5STF

## (undated) DEVICE — SUCTION IRR STRYKERFLOW II W/TIP 250-070-520

## (undated) DEVICE — ESU GROUND PAD UNIVERSAL W/O CORD

## (undated) DEVICE — PACK LAP CHOLE SLC15LCFSD

## (undated) DEVICE — ESU HOLDER LAP INST DISP PURPLE LONG 330MM H-PRO-330

## (undated) DEVICE — CLIP APPLIER ENDO 05MM MED/LG 176630

## (undated) RX ORDER — PROPOFOL 10 MG/ML
INJECTION, EMULSION INTRAVENOUS
Status: DISPENSED
Start: 2018-03-09

## (undated) RX ORDER — BUPIVACAINE HYDROCHLORIDE 2.5 MG/ML
INJECTION, SOLUTION EPIDURAL; INFILTRATION; INTRACAUDAL
Status: DISPENSED
Start: 2018-03-09

## (undated) RX ORDER — FENTANYL CITRATE 50 UG/ML
INJECTION, SOLUTION INTRAMUSCULAR; INTRAVENOUS
Status: DISPENSED
Start: 2018-03-09

## (undated) RX ORDER — ONDANSETRON 2 MG/ML
INJECTION INTRAMUSCULAR; INTRAVENOUS
Status: DISPENSED
Start: 2018-03-09

## (undated) RX ORDER — CEFAZOLIN SODIUM 2 G/100ML
INJECTION, SOLUTION INTRAVENOUS
Status: DISPENSED
Start: 2018-03-09

## (undated) RX ORDER — EPINEPHRINE 1 MG/ML
INJECTION, SOLUTION INTRAMUSCULAR; SUBCUTANEOUS
Status: DISPENSED
Start: 2018-03-09

## (undated) RX ORDER — DEXAMETHASONE SODIUM PHOSPHATE 4 MG/ML
INJECTION, SOLUTION INTRA-ARTICULAR; INTRALESIONAL; INTRAMUSCULAR; INTRAVENOUS; SOFT TISSUE
Status: DISPENSED
Start: 2018-03-09

## (undated) RX ORDER — HYDROMORPHONE HYDROCHLORIDE 1 MG/ML
INJECTION, SOLUTION INTRAMUSCULAR; INTRAVENOUS; SUBCUTANEOUS
Status: DISPENSED
Start: 2018-03-09

## (undated) RX ORDER — DIPHENHYDRAMINE HYDROCHLORIDE 50 MG/ML
INJECTION INTRAMUSCULAR; INTRAVENOUS
Status: DISPENSED
Start: 2018-03-09

## (undated) RX ORDER — HYDROCODONE BITARTRATE AND ACETAMINOPHEN 5; 325 MG/1; MG/1
TABLET ORAL
Status: DISPENSED
Start: 2018-03-09